# Patient Record
Sex: FEMALE | Race: WHITE | NOT HISPANIC OR LATINO | Employment: OTHER | URBAN - METROPOLITAN AREA
[De-identification: names, ages, dates, MRNs, and addresses within clinical notes are randomized per-mention and may not be internally consistent; named-entity substitution may affect disease eponyms.]

---

## 2021-05-19 ENCOUNTER — OFFICE VISIT (OUTPATIENT)
Dept: INTERNAL MEDICINE CLINIC | Facility: CLINIC | Age: 86
End: 2021-05-19
Payer: MEDICARE

## 2021-05-19 VITALS
HEART RATE: 94 BPM | TEMPERATURE: 98 F | DIASTOLIC BLOOD PRESSURE: 78 MMHG | OXYGEN SATURATION: 95 % | SYSTOLIC BLOOD PRESSURE: 124 MMHG | BODY MASS INDEX: 34.36 KG/M2 | WEIGHT: 175 LBS | HEIGHT: 60 IN

## 2021-05-19 DIAGNOSIS — E66.9 CLASS 1 OBESITY WITHOUT SERIOUS COMORBIDITY WITH BODY MASS INDEX (BMI) OF 34.0 TO 34.9 IN ADULT, UNSPECIFIED OBESITY TYPE: ICD-10-CM

## 2021-05-19 DIAGNOSIS — R60.0 LOCALIZED EDEMA: Primary | ICD-10-CM

## 2021-05-19 PROCEDURE — 99204 OFFICE O/P NEW MOD 45 MIN: CPT | Performed by: NURSE PRACTITIONER

## 2021-05-19 RX ORDER — MV-MIN/FA/VIT K/LUTEIN/ZEAXANT 200MCG-5MG
CAPSULE ORAL
COMMUNITY

## 2021-05-19 RX ORDER — FUROSEMIDE 20 MG/1
20 TABLET ORAL DAILY
Qty: 90 TABLET | Refills: 0 | Status: ON HOLD | OUTPATIENT
Start: 2021-05-19 | End: 2021-07-14 | Stop reason: SDUPTHER

## 2021-05-19 RX ORDER — AMOXICILLIN 500 MG/1
500 TABLET, FILM COATED ORAL 2 TIMES DAILY
COMMUNITY

## 2021-05-19 NOTE — PROGRESS NOTES
BMI Counseling: Body mass index is 34 18 kg/m²  The BMI is above normal  Nutrition recommendations include decreasing portion sizes, encouraging healthy choices of fruits and vegetables, decreasing fast food intake, consuming healthier snacks, limiting drinks that contain sugar, moderation in carbohydrate intake, increasing intake of lean protein, reducing intake of saturated and trans fat and reducing intake of cholesterol  Exercise recommendations include exercising 3-5 times per week  No pharmacotherapy was ordered  Patient referred to PCP due to patient being overweight  Assessment/Plan:    B/L LE edema, L>R- Has difficulty managing compression stockings as she lives alone, will try to enlist help from friends and family- on in am off in pm  Elevate legs as much as possible  Will do blood work prior to initiating Furosemide, then repeat BMP in two weeks  Follow up in three months  Diagnoses and all orders for this visit:    Localized edema  -     CBC and differential; Future  -     Comprehensive metabolic panel; Future  -     Lipid panel; Future  -     TSH, 3rd generation with Free T4 reflex; Future  -     UA w Reflex to Microscopic w Reflex to Culture -Lab Collect  -     furosemide (LASIX) 20 mg tablet; Take 1 tablet (20 mg total) by mouth daily  -     Compression Stocking  -     Basic metabolic panel; Future    Class 1 obesity without serious comorbidity with body mass index (BMI) of 34 0 to 34 9 in adult, unspecified obesity type   -     Lipid panel; Future    Other orders  -     amoxicillin (AMOXIL) 500 MG tablet; Take 500 mg by mouth 2 (two) times a day Before dental procedures  -     Cyanocobalamin (B-12 PO); Take by mouth  -     Acetaminophen (Tylenol) 325 MG CAPS; Take by mouth 2 (two) times a day as needed  -     Multiple Vitamins-Minerals (PreserVision AREDS 2+Multi Vit) CAPS; Take by mouth  -     Ascorbic Acid (VITAMIN C PO);  Take by mouth        The patient was counseled regarding instructions for management, risk factor reductions, patient and family education,impressions, risks and benefits of treatment options, side effects of medications, importance of compliance with treatment  The treatment plan was reviewed with the patient/guardian and patient/guardian understands and agrees with the treatment plan  Current Outpatient Medications:     Acetaminophen (Tylenol) 325 MG CAPS, Take by mouth 2 (two) times a day as needed, Disp: , Rfl:     amoxicillin (AMOXIL) 500 MG tablet, Take 500 mg by mouth 2 (two) times a day Before dental procedures, Disp: , Rfl:     Ascorbic Acid (VITAMIN C PO), Take by mouth, Disp: , Rfl:     Cyanocobalamin (B-12 PO), Take by mouth, Disp: , Rfl:     Multiple Vitamins-Minerals (PreserVision AREDS 2+Multi Vit) CAPS, Take by mouth, Disp: , Rfl:     furosemide (LASIX) 20 mg tablet, Take 1 tablet (20 mg total) by mouth daily, Disp: 90 tablet, Rfl: 0    Subjective:      Patient ID: Giovany Raza is a 80 y o  female  New patient here to establish, does not take any prescription medication at this time  Lives independently, has supportive family nearby  Ambulates with walker, no falls  Only complaint today is B/L LE swelling, L>R, states she was on a "water pill" in the past  Has difficulty applying compression stockings  The following portions of the patient's history were reviewed and updated as appropriate:   She has a past medical history of Breast cancer ([facility] [address] )  ,  does not have any pertinent problems on file  ,   has a past surgical history that includes EAR SURGERY; Hip surgery; and Knee surgery  ,  family history is not on file  ,   reports that she has been smoking  She has never used smokeless tobacco  She reports current alcohol use  She reports that she does not use drugs  ,  has No Known Allergies       Review of Systems   Constitutional: Negative  Negative for chills and fever  HENT: Negative for ear pain, rhinorrhea, sinus pain and sore throat  Eyes: Negative for redness and visual disturbance  Respiratory: Negative  Negative for cough, chest tightness and shortness of breath  Cardiovascular: Positive for leg swelling  Negative for chest pain and palpitations  Gastrointestinal: Negative for abdominal pain, constipation, diarrhea, nausea and vomiting  Endocrine: Negative  Genitourinary: Negative for dysuria, frequency and urgency  Musculoskeletal: Negative  Negative for arthralgias, back pain and myalgias  Skin: Negative for rash  Neurological: Negative for dizziness, numbness and headaches  Psychiatric/Behavioral: Negative  Objective:  /78 (BP Location: Left arm, Patient Position: Sitting, Cuff Size: Adult)   Pulse 94   Temp 98 °F (36 7 °C) (Temporal)   Ht 5' (1 524 m)   Wt 79 4 kg (175 lb)   SpO2 95%   BMI 34 18 kg/m²     Lab Review  No results found for any previous visit  Imaging: No results found  Physical Exam  Constitutional:       Appearance: She is well-developed  HENT:      Head: Normocephalic and atraumatic  Right Ear: External ear normal       Left Ear: External ear normal       Nose: Nose normal    Eyes:      General: Lids are normal       Conjunctiva/sclera: Conjunctivae normal       Pupils: Pupils are equal, round, and reactive to light  Neck:      Musculoskeletal: Normal range of motion and neck supple  Thyroid: No thyroid mass  Vascular: No carotid bruit  Cardiovascular:      Rate and Rhythm: Normal rate and regular rhythm  Heart sounds: Normal heart sounds  Pulmonary:      Effort: Pulmonary effort is normal       Breath sounds: Normal breath sounds  Abdominal:      General: Bowel sounds are normal       Palpations: Abdomen is soft  Musculoskeletal:      Right lower leg: Edema present  Left lower leg: Edema present  Comments: Ambulates with walker   Skin:     General: Skin is warm and dry     Neurological:      Mental Status: She is alert and oriented to person, place, and time  Deep Tendon Reflexes: Reflexes are normal and symmetric     Psychiatric:         Speech: Speech normal          Behavior: Behavior normal

## 2021-05-19 NOTE — PATIENT INSTRUCTIONS
B/L LE edema, L>R- Has difficulty managing compression stockings as she lives alone, will try to enlist help from friends and family- on in am off in pm  Elevate legs as much as possible  Will do blood work prior to initiating Furosemide, then repeat BMP in two weeks  Follow up in three months

## 2021-05-20 ENCOUNTER — APPOINTMENT (OUTPATIENT)
Dept: LAB | Facility: CLINIC | Age: 86
End: 2021-05-20
Payer: MEDICARE

## 2021-05-20 DIAGNOSIS — E66.9 CLASS 1 OBESITY WITHOUT SERIOUS COMORBIDITY WITH BODY MASS INDEX (BMI) OF 34.0 TO 34.9 IN ADULT, UNSPECIFIED OBESITY TYPE: ICD-10-CM

## 2021-05-20 DIAGNOSIS — R60.0 LOCALIZED EDEMA: ICD-10-CM

## 2021-05-20 LAB
ALBUMIN SERPL BCP-MCNC: 3.6 G/DL (ref 3.5–5)
ALP SERPL-CCNC: 80 U/L (ref 46–116)
ALT SERPL W P-5'-P-CCNC: 14 U/L (ref 12–78)
ANION GAP SERPL CALCULATED.3IONS-SCNC: 7 MMOL/L (ref 4–13)
AST SERPL W P-5'-P-CCNC: 19 U/L (ref 5–45)
BASOPHILS # BLD AUTO: 0.03 THOUSANDS/ΜL (ref 0–0.1)
BASOPHILS NFR BLD AUTO: 0 % (ref 0–1)
BILIRUB SERPL-MCNC: 0.85 MG/DL (ref 0.2–1)
BILIRUB UR QL STRIP: NEGATIVE
BUN SERPL-MCNC: 23 MG/DL (ref 5–25)
CALCIUM SERPL-MCNC: 9.8 MG/DL (ref 8.3–10.1)
CHLORIDE SERPL-SCNC: 106 MMOL/L (ref 100–108)
CHOLEST SERPL-MCNC: 217 MG/DL (ref 50–200)
CLARITY UR: NORMAL
CO2 SERPL-SCNC: 28 MMOL/L (ref 21–32)
COLOR UR: YELLOW
CREAT SERPL-MCNC: 1.14 MG/DL (ref 0.6–1.3)
EOSINOPHIL # BLD AUTO: 0.22 THOUSAND/ΜL (ref 0–0.61)
EOSINOPHIL NFR BLD AUTO: 3 % (ref 0–6)
ERYTHROCYTE [DISTWIDTH] IN BLOOD BY AUTOMATED COUNT: 14.5 % (ref 11.6–15.1)
GFR SERPL CREATININE-BSD FRML MDRD: 42 ML/MIN/1.73SQ M
GLUCOSE P FAST SERPL-MCNC: 101 MG/DL (ref 65–99)
GLUCOSE UR STRIP-MCNC: NEGATIVE MG/DL
HCT VFR BLD AUTO: 43.9 % (ref 34.8–46.1)
HDLC SERPL-MCNC: 52 MG/DL
HGB BLD-MCNC: 14.4 G/DL (ref 11.5–15.4)
HGB UR QL STRIP.AUTO: NEGATIVE
IMM GRANULOCYTES # BLD AUTO: 0.03 THOUSAND/UL (ref 0–0.2)
IMM GRANULOCYTES NFR BLD AUTO: 0 % (ref 0–2)
KETONES UR STRIP-MCNC: NEGATIVE MG/DL
LDLC SERPL CALC-MCNC: 125 MG/DL (ref 0–100)
LEUKOCYTE ESTERASE UR QL STRIP: NEGATIVE
LYMPHOCYTES # BLD AUTO: 1.99 THOUSANDS/ΜL (ref 0.6–4.47)
LYMPHOCYTES NFR BLD AUTO: 29 % (ref 14–44)
MCH RBC QN AUTO: 30 PG (ref 26.8–34.3)
MCHC RBC AUTO-ENTMCNC: 32.8 G/DL (ref 31.4–37.4)
MCV RBC AUTO: 92 FL (ref 82–98)
MONOCYTES # BLD AUTO: 0.58 THOUSAND/ΜL (ref 0.17–1.22)
MONOCYTES NFR BLD AUTO: 9 % (ref 4–12)
NEUTROPHILS # BLD AUTO: 4.01 THOUSANDS/ΜL (ref 1.85–7.62)
NEUTS SEG NFR BLD AUTO: 59 % (ref 43–75)
NITRITE UR QL STRIP: NEGATIVE
NONHDLC SERPL-MCNC: 165 MG/DL
NRBC BLD AUTO-RTO: 0 /100 WBCS
PH UR STRIP.AUTO: 6.5 [PH]
PLATELET # BLD AUTO: 273 THOUSANDS/UL (ref 149–390)
PMV BLD AUTO: 9 FL (ref 8.9–12.7)
POTASSIUM SERPL-SCNC: 4.2 MMOL/L (ref 3.5–5.3)
PROT SERPL-MCNC: 6.9 G/DL (ref 6.4–8.2)
PROT UR STRIP-MCNC: NEGATIVE MG/DL
RBC # BLD AUTO: 4.8 MILLION/UL (ref 3.81–5.12)
SODIUM SERPL-SCNC: 141 MMOL/L (ref 136–145)
SP GR UR STRIP.AUTO: 1.02 (ref 1–1.03)
TRIGL SERPL-MCNC: 201 MG/DL
TSH SERPL DL<=0.05 MIU/L-ACNC: 3.66 UIU/ML (ref 0.36–3.74)
UROBILINOGEN UR QL STRIP.AUTO: 1 E.U./DL
WBC # BLD AUTO: 6.86 THOUSAND/UL (ref 4.31–10.16)

## 2021-05-20 PROCEDURE — 85025 COMPLETE CBC W/AUTO DIFF WBC: CPT

## 2021-05-20 PROCEDURE — 84443 ASSAY THYROID STIM HORMONE: CPT

## 2021-05-20 PROCEDURE — 81003 URINALYSIS AUTO W/O SCOPE: CPT | Performed by: NURSE PRACTITIONER

## 2021-05-20 PROCEDURE — 80053 COMPREHEN METABOLIC PANEL: CPT

## 2021-05-20 PROCEDURE — 36415 COLL VENOUS BLD VENIPUNCTURE: CPT

## 2021-05-20 PROCEDURE — 80061 LIPID PANEL: CPT

## 2021-06-03 ENCOUNTER — APPOINTMENT (OUTPATIENT)
Dept: LAB | Facility: CLINIC | Age: 86
End: 2021-06-03
Payer: MEDICARE

## 2021-06-03 DIAGNOSIS — R60.0 LOCALIZED EDEMA: ICD-10-CM

## 2021-06-03 LAB
ANION GAP SERPL CALCULATED.3IONS-SCNC: 6 MMOL/L (ref 4–13)
BUN SERPL-MCNC: 37 MG/DL (ref 5–25)
CALCIUM SERPL-MCNC: 9.9 MG/DL (ref 8.3–10.1)
CHLORIDE SERPL-SCNC: 100 MMOL/L (ref 100–108)
CO2 SERPL-SCNC: 33 MMOL/L (ref 21–32)
CREAT SERPL-MCNC: 1.48 MG/DL (ref 0.6–1.3)
GFR SERPL CREATININE-BSD FRML MDRD: 31 ML/MIN/1.73SQ M
GLUCOSE P FAST SERPL-MCNC: 120 MG/DL (ref 65–99)
POTASSIUM SERPL-SCNC: 3.1 MMOL/L (ref 3.5–5.3)
SODIUM SERPL-SCNC: 139 MMOL/L (ref 136–145)

## 2021-06-03 PROCEDURE — 80048 BASIC METABOLIC PNL TOTAL CA: CPT

## 2021-06-03 PROCEDURE — 36415 COLL VENOUS BLD VENIPUNCTURE: CPT

## 2021-06-21 ENCOUNTER — TELEPHONE (OUTPATIENT)
Dept: INTERNAL MEDICINE CLINIC | Facility: CLINIC | Age: 86
End: 2021-06-21

## 2021-06-21 PROBLEM — T14.8XXA WOUND OF SKIN: Status: ACTIVE | Noted: 2021-06-21

## 2021-06-21 NOTE — TELEPHONE ENCOUNTER
[facility] states due to their census they cannot take any new patients at this time  Any questions [facility] can be reached at [phone]

## 2021-07-12 ENCOUNTER — APPOINTMENT (EMERGENCY)
Dept: RADIOLOGY | Facility: HOSPITAL | Age: 86
DRG: 700 | End: 2021-07-12
Payer: MEDICARE

## 2021-07-12 ENCOUNTER — HOSPITAL ENCOUNTER (INPATIENT)
Facility: HOSPITAL | Age: 86
LOS: 2 days | Discharge: HOME HEALTH | DRG: 700 | End: 2021-07-14
Attending: EMERGENCY MEDICINE | Admitting: INTERNAL MEDICINE
Payer: MEDICARE

## 2021-07-12 DIAGNOSIS — M79.89 LEG SWELLING: Primary | ICD-10-CM

## 2021-07-12 DIAGNOSIS — R60.0 LOCALIZED EDEMA: ICD-10-CM

## 2021-07-12 PROBLEM — R03.0 ELEVATED BLOOD PRESSURE READING: Status: ACTIVE | Noted: 2021-07-12

## 2021-07-12 PROBLEM — R60.1 ANASARCA: Status: ACTIVE | Noted: 2021-07-12

## 2021-07-12 LAB
ALBUMIN SERPL BCP-MCNC: 3.7 G/DL (ref 3.5–5)
ALP SERPL-CCNC: 96 U/L (ref 46–116)
ALT SERPL W P-5'-P-CCNC: 13 U/L (ref 12–78)
ANION GAP SERPL CALCULATED.3IONS-SCNC: 10 MMOL/L (ref 4–13)
AST SERPL W P-5'-P-CCNC: 40 U/L (ref 5–45)
ATRIAL RATE: 72 BPM
BACTERIA UR QL AUTO: ABNORMAL /HPF
BASOPHILS # BLD AUTO: 0.02 THOUSANDS/ΜL (ref 0–0.1)
BASOPHILS NFR BLD AUTO: 0 % (ref 0–1)
BILIRUB SERPL-MCNC: 1.11 MG/DL (ref 0.2–1)
BILIRUB UR QL STRIP: NEGATIVE
BUN SERPL-MCNC: 14 MG/DL (ref 5–25)
CALCIUM SERPL-MCNC: 9.2 MG/DL (ref 8.3–10.1)
CHLORIDE SERPL-SCNC: 106 MMOL/L (ref 100–108)
CLARITY UR: ABNORMAL
CO2 SERPL-SCNC: 25 MMOL/L (ref 21–32)
COLOR UR: YELLOW
CREAT SERPL-MCNC: 1.02 MG/DL (ref 0.6–1.3)
EOSINOPHIL # BLD AUTO: 0.17 THOUSAND/ΜL (ref 0–0.61)
EOSINOPHIL NFR BLD AUTO: 2 % (ref 0–6)
ERYTHROCYTE [DISTWIDTH] IN BLOOD BY AUTOMATED COUNT: 15.1 % (ref 11.6–15.1)
GFR SERPL CREATININE-BSD FRML MDRD: 48 ML/MIN/1.73SQ M
GLUCOSE SERPL-MCNC: 99 MG/DL (ref 65–140)
GLUCOSE UR STRIP-MCNC: NEGATIVE MG/DL
HCT VFR BLD AUTO: 43.3 % (ref 34.8–46.1)
HGB BLD-MCNC: 14.2 G/DL (ref 11.5–15.4)
HGB UR QL STRIP.AUTO: NEGATIVE
IMM GRANULOCYTES # BLD AUTO: 0.02 THOUSAND/UL (ref 0–0.2)
IMM GRANULOCYTES NFR BLD AUTO: 0 % (ref 0–2)
KETONES UR STRIP-MCNC: NEGATIVE MG/DL
LEUKOCYTE ESTERASE UR QL STRIP: ABNORMAL
LYMPHOCYTES # BLD AUTO: 1.76 THOUSANDS/ΜL (ref 0.6–4.47)
LYMPHOCYTES NFR BLD AUTO: 23 % (ref 14–44)
MCH RBC QN AUTO: 29.7 PG (ref 26.8–34.3)
MCHC RBC AUTO-ENTMCNC: 32.8 G/DL (ref 31.4–37.4)
MCV RBC AUTO: 91 FL (ref 82–98)
MONOCYTES # BLD AUTO: 0.53 THOUSAND/ΜL (ref 0.17–1.22)
MONOCYTES NFR BLD AUTO: 7 % (ref 4–12)
NEUTROPHILS # BLD AUTO: 5.32 THOUSANDS/ΜL (ref 1.85–7.62)
NEUTS SEG NFR BLD AUTO: 68 % (ref 43–75)
NITRITE UR QL STRIP: NEGATIVE
NON-SQ EPI CELLS URNS QL MICRO: ABNORMAL /HPF
NRBC BLD AUTO-RTO: 0 /100 WBCS
NT-PROBNP SERPL-MCNC: 957 PG/ML
P AXIS: 53 DEGREES
PH UR STRIP.AUTO: 6 [PH]
PLATELET # BLD AUTO: 239 THOUSANDS/UL (ref 149–390)
PMV BLD AUTO: 8.9 FL (ref 8.9–12.7)
POTASSIUM SERPL-SCNC: 4.8 MMOL/L (ref 3.5–5.3)
PR INTERVAL: 344 MS
PROT SERPL-MCNC: 7.6 G/DL (ref 6.4–8.2)
PROT UR STRIP-MCNC: NEGATIVE MG/DL
QRS AXIS: -55 DEGREES
QRSD INTERVAL: 132 MS
QT INTERVAL: 432 MS
QTC INTERVAL: 473 MS
RBC # BLD AUTO: 4.78 MILLION/UL (ref 3.81–5.12)
RBC #/AREA URNS AUTO: ABNORMAL /HPF
SODIUM SERPL-SCNC: 141 MMOL/L (ref 136–145)
SP GR UR STRIP.AUTO: 1.02 (ref 1–1.03)
T WAVE AXIS: 29 DEGREES
TROPONIN I SERPL-MCNC: <0.02 NG/ML
UROBILINOGEN UR QL STRIP.AUTO: 0.2 E.U./DL
VENTRICULAR RATE: 72 BPM
WBC # BLD AUTO: 7.82 THOUSAND/UL (ref 4.31–10.16)
WBC #/AREA URNS AUTO: ABNORMAL /HPF

## 2021-07-12 PROCEDURE — 99223 1ST HOSP IP/OBS HIGH 75: CPT | Performed by: INTERNAL MEDICINE

## 2021-07-12 PROCEDURE — 71045 X-RAY EXAM CHEST 1 VIEW: CPT

## 2021-07-12 PROCEDURE — 80053 COMPREHEN METABOLIC PANEL: CPT | Performed by: EMERGENCY MEDICINE

## 2021-07-12 PROCEDURE — 83880 ASSAY OF NATRIURETIC PEPTIDE: CPT | Performed by: EMERGENCY MEDICINE

## 2021-07-12 PROCEDURE — 84484 ASSAY OF TROPONIN QUANT: CPT | Performed by: EMERGENCY MEDICINE

## 2021-07-12 PROCEDURE — 93010 ELECTROCARDIOGRAM REPORT: CPT | Performed by: INTERNAL MEDICINE

## 2021-07-12 PROCEDURE — 99285 EMERGENCY DEPT VISIT HI MDM: CPT

## 2021-07-12 PROCEDURE — 93005 ELECTROCARDIOGRAM TRACING: CPT

## 2021-07-12 PROCEDURE — 36415 COLL VENOUS BLD VENIPUNCTURE: CPT | Performed by: EMERGENCY MEDICINE

## 2021-07-12 PROCEDURE — 99284 EMERGENCY DEPT VISIT MOD MDM: CPT | Performed by: EMERGENCY MEDICINE

## 2021-07-12 PROCEDURE — 85025 COMPLETE CBC W/AUTO DIFF WBC: CPT | Performed by: EMERGENCY MEDICINE

## 2021-07-12 PROCEDURE — 1124F ACP DISCUSS-NO DSCNMKR DOCD: CPT | Performed by: INTERNAL MEDICINE

## 2021-07-12 PROCEDURE — 81001 URINALYSIS AUTO W/SCOPE: CPT | Performed by: INTERNAL MEDICINE

## 2021-07-12 RX ORDER — FUROSEMIDE 10 MG/ML
20 INJECTION INTRAMUSCULAR; INTRAVENOUS DAILY
Status: DISCONTINUED | OUTPATIENT
Start: 2021-07-12 | End: 2021-07-14

## 2021-07-12 RX ORDER — NICOTINE 21 MG/24HR
1 PATCH, TRANSDERMAL 24 HOURS TRANSDERMAL DAILY
Status: DISCONTINUED | OUTPATIENT
Start: 2021-07-13 | End: 2021-07-14 | Stop reason: HOSPADM

## 2021-07-12 RX ORDER — ACETAMINOPHEN 325 MG/1
650 TABLET ORAL EVERY 6 HOURS PRN
Status: DISCONTINUED | OUTPATIENT
Start: 2021-07-12 | End: 2021-07-14 | Stop reason: HOSPADM

## 2021-07-12 RX ORDER — HEPARIN SODIUM 5000 [USP'U]/ML
5000 INJECTION, SOLUTION INTRAVENOUS; SUBCUTANEOUS EVERY 8 HOURS SCHEDULED
Status: DISCONTINUED | OUTPATIENT
Start: 2021-07-12 | End: 2021-07-14 | Stop reason: HOSPADM

## 2021-07-12 RX ADMIN — HEPARIN SODIUM 5000 UNITS: 5000 INJECTION INTRAVENOUS; SUBCUTANEOUS at 21:09

## 2021-07-12 RX ADMIN — FUROSEMIDE 20 MG: 10 INJECTION, SOLUTION INTRAMUSCULAR; INTRAVENOUS at 21:09

## 2021-07-13 ENCOUNTER — APPOINTMENT (INPATIENT)
Dept: NON INVASIVE DIAGNOSTICS | Facility: HOSPITAL | Age: 86
DRG: 700 | End: 2021-07-13
Payer: MEDICARE

## 2021-07-13 LAB
ANION GAP SERPL CALCULATED.3IONS-SCNC: 9 MMOL/L (ref 4–13)
BASOPHILS # BLD AUTO: 0.03 THOUSANDS/ΜL (ref 0–0.1)
BASOPHILS NFR BLD AUTO: 1 % (ref 0–1)
BUN SERPL-MCNC: 12 MG/DL (ref 5–25)
CALCIUM SERPL-MCNC: 9 MG/DL (ref 8.3–10.1)
CHLORIDE SERPL-SCNC: 108 MMOL/L (ref 100–108)
CO2 SERPL-SCNC: 27 MMOL/L (ref 21–32)
CREAT SERPL-MCNC: 1.12 MG/DL (ref 0.6–1.3)
CREAT UR-MCNC: 18.1 MG/DL
EOSINOPHIL # BLD AUTO: 0.23 THOUSAND/ΜL (ref 0–0.61)
EOSINOPHIL NFR BLD AUTO: 4 % (ref 0–6)
ERYTHROCYTE [DISTWIDTH] IN BLOOD BY AUTOMATED COUNT: 15.1 % (ref 11.6–15.1)
GFR SERPL CREATININE-BSD FRML MDRD: 43 ML/MIN/1.73SQ M
GLUCOSE SERPL-MCNC: 94 MG/DL (ref 65–140)
GLUCOSE SERPL-MCNC: 99 MG/DL (ref 65–140)
HCT VFR BLD AUTO: 37.5 % (ref 34.8–46.1)
HGB BLD-MCNC: 12.5 G/DL (ref 11.5–15.4)
IMM GRANULOCYTES # BLD AUTO: 0.02 THOUSAND/UL (ref 0–0.2)
IMM GRANULOCYTES NFR BLD AUTO: 0 % (ref 0–2)
LYMPHOCYTES # BLD AUTO: 1.44 THOUSANDS/ΜL (ref 0.6–4.47)
LYMPHOCYTES NFR BLD AUTO: 25 % (ref 14–44)
MCH RBC QN AUTO: 30.3 PG (ref 26.8–34.3)
MCHC RBC AUTO-ENTMCNC: 33.3 G/DL (ref 31.4–37.4)
MCV RBC AUTO: 91 FL (ref 82–98)
MONOCYTES # BLD AUTO: 0.55 THOUSAND/ΜL (ref 0.17–1.22)
MONOCYTES NFR BLD AUTO: 10 % (ref 4–12)
NEUTROPHILS # BLD AUTO: 3.4 THOUSANDS/ΜL (ref 1.85–7.62)
NEUTS SEG NFR BLD AUTO: 60 % (ref 43–75)
NRBC BLD AUTO-RTO: 0 /100 WBCS
PLATELET # BLD AUTO: 198 THOUSANDS/UL (ref 149–390)
PMV BLD AUTO: 9.3 FL (ref 8.9–12.7)
POTASSIUM SERPL-SCNC: 3.8 MMOL/L (ref 3.5–5.3)
PROT UR-MCNC: <6 MG/DL
PROT/CREAT UR: <0.33 MG/G{CREAT} (ref 0–0.1)
RBC # BLD AUTO: 4.13 MILLION/UL (ref 3.81–5.12)
SODIUM SERPL-SCNC: 144 MMOL/L (ref 136–145)
WBC # BLD AUTO: 5.67 THOUSAND/UL (ref 4.31–10.16)

## 2021-07-13 PROCEDURE — 84156 ASSAY OF PROTEIN URINE: CPT | Performed by: PHYSICIAN ASSISTANT

## 2021-07-13 PROCEDURE — 99232 SBSQ HOSP IP/OBS MODERATE 35: CPT | Performed by: PHYSICIAN ASSISTANT

## 2021-07-13 PROCEDURE — 82570 ASSAY OF URINE CREATININE: CPT | Performed by: PHYSICIAN ASSISTANT

## 2021-07-13 PROCEDURE — 85025 COMPLETE CBC W/AUTO DIFF WBC: CPT | Performed by: INTERNAL MEDICINE

## 2021-07-13 PROCEDURE — 36415 COLL VENOUS BLD VENIPUNCTURE: CPT | Performed by: INTERNAL MEDICINE

## 2021-07-13 PROCEDURE — 80048 BASIC METABOLIC PNL TOTAL CA: CPT | Performed by: INTERNAL MEDICINE

## 2021-07-13 PROCEDURE — 93306 TTE W/DOPPLER COMPLETE: CPT

## 2021-07-13 PROCEDURE — 82948 REAGENT STRIP/BLOOD GLUCOSE: CPT

## 2021-07-13 PROCEDURE — 93306 TTE W/DOPPLER COMPLETE: CPT | Performed by: INTERNAL MEDICINE

## 2021-07-13 RX ORDER — LOPERAMIDE HYDROCHLORIDE 2 MG/1
2 CAPSULE ORAL 4 TIMES DAILY PRN
Status: DISCONTINUED | OUTPATIENT
Start: 2021-07-13 | End: 2021-07-14 | Stop reason: HOSPADM

## 2021-07-13 RX ADMIN — HEPARIN SODIUM 5000 UNITS: 5000 INJECTION INTRAVENOUS; SUBCUTANEOUS at 13:37

## 2021-07-13 RX ADMIN — FUROSEMIDE 20 MG: 10 INJECTION, SOLUTION INTRAMUSCULAR; INTRAVENOUS at 08:46

## 2021-07-13 RX ADMIN — HEPARIN SODIUM 5000 UNITS: 5000 INJECTION INTRAVENOUS; SUBCUTANEOUS at 21:09

## 2021-07-13 RX ADMIN — HEPARIN SODIUM 5000 UNITS: 5000 INJECTION INTRAVENOUS; SUBCUTANEOUS at 06:18

## 2021-07-13 NOTE — PROGRESS NOTES
[facility] Progress Note - Fransisca Player 7/1/1929, 80 y o  female MRN: [id number]  Unit/Bed#: FT 05 Encounter: [id number]  Primary Care Provider: KEVIN Soto   Date and time admitted to hospital: 7/12/2021  6:38 PM    * Anasarca  Assessment & Plan  · Unclear etiology, normal protein and albumin with BMI 34, unlikely malnutrition  · Has been on diuretic in the past and utilizes compression stockings when able to apply at present  · , consider CHF vs nephrotic syndrome  · Check urine protein/creatinine ratio  · Check echo  · LFTs unremarkable save minimally elevated bilirubin  · Continue IV diuresis as initiated on admit  · Monitor I/Os and weights  · Low sodium diet    Elevated blood pressure reading  Assessment & Plan  · Systolic BP in emergency department noted greater than 170  · Isolated, appears controlled now  · Will continue to monitor, hold on initiating scheduled medication  · IV Labetalol p r n   /59 (BP Location: Right arm)   Pulse 71   Temp 97 9 °F (36 6 °C) (Oral)   Resp 21   SpO2 96%           VTE Pharmacologic Prophylaxis: VTE Score: 6 High Risk (Score >/= 5) - Pharmacological DVT Prophylaxis Ordered: heparin  Sequential Compression Devices Ordered  Patient Centered Rounds: I evaluated the patient without nursing staff present due to d/w ER nurse   Discussions with Specialists or Other Care Team Provider: CM     Education and Discussions with Family / Patient: Updated  (daughter) via phone  Time Spent for Care: 20 minutes  More than 50% of total time spent on counseling and coordination of care as described above  Current Length of Stay: 1 day(s)  Current Patient Status: Inpatient   Certification Statement: The patient will continue to require additional inpatient hospital stay due to IV diuresis  Discharge Plan: Anticipate discharge in 24-48 hrs to home      Code Status: Level 1 - Full Code    Subjective:   CC My legs still swollen but better    Patient seen this morning in the ER and doing well  Denies muscle cramps or chest pain  Denies pillow orthopnea or exertional shortness of breath  Objective:     Vitals:   Temp (24hrs), Av 9 °F (36 6 °C), Min:97 9 °F (36 6 °C), Max:97 9 °F (36 6 °C)    Temp:  [97 9 °F (36 6 °C)] 97 9 °F (36 6 °C)  HR:  [71-83] 71  Resp:  [18-21] 21  BP: (120-178)/(59-76) 120/59  SpO2:  [95 %-96 %] 96 %  There is no height or weight on file to calculate BMI  Input and Output Summary (last 24 hours):   No intake or output data in the 24 hours ending 21 0747    Physical Exam:   Physical Exam  Vitals and nursing note reviewed  Constitutional:       General: She is not in acute distress  Appearance: She is not toxic-appearing  HENT:      Head:      Comments: [place]  Cardiovascular:      Rate and Rhythm: Normal rate and regular rhythm  Heart sounds: No murmur heard  Pulmonary:      Effort: Pulmonary effort is normal       Breath sounds: Normal breath sounds  No rales  Abdominal:      General: Bowel sounds are normal       Palpations: Abdomen is soft  Musculoskeletal:      Right lower leg: Edema present  Left lower leg: Edema present  Neurological:      Mental Status: She is alert and oriented to person, place, and time     Psychiatric:         Mood and Affect: Mood normal            Additional Data:     Labs:  Results from last 7 days   Lab Units 21  0617   WBC Thousand/uL 5 67   HEMOGLOBIN g/dL 12 5   HEMATOCRIT % 37 5   PLATELETS Thousands/uL 198   NEUTROS PCT % 60   LYMPHS PCT % 25   MONOS PCT % 10   EOS PCT % 4     Results from last 7 days   Lab Units 21  1912   SODIUM mmol/L 141   POTASSIUM mmol/L 4 8   CHLORIDE mmol/L 106   CO2 mmol/L 25   BUN mg/dL 14   CREATININE mg/dL 1 02   ANION GAP mmol/L 10   CALCIUM mg/dL 9 2   ALBUMIN g/dL 3 7   TOTAL BILIRUBIN mg/dL 1 11*   ALK PHOS U/L 96   ALT U/L 13   AST U/L 40   GLUCOSE RANDOM mg/dL 99 Lines/Drains:  Invasive Devices     Peripheral Intravenous Line            Peripheral IV 07/12/21 Right Antecubital <1 day          Drain            External Urinary Catheter <1 day                      Imaging: No pertinent imaging reviewed  Recent Cultures (last 7 days):         Last 24 Hours Medication List:   Current Facility-Administered Medications   Medication Dose Route Frequency Provider Last Rate    acetaminophen  650 mg Oral Q6H PRN Rohan Alexander MD      furosemide  20 mg Intravenous Daily Rohan Alexander MD      heparin (porcine)  5,000 Units Subcutaneous Q8H [facility] Rohan Alexander MD      nicotine  1 patch Transdermal Daily Luis Alfredo Joiner MD          Today, Patient Was Seen By: Zechariah Cantrell PA-C    **Please Note: This note may have been constructed using a voice recognition system  **

## 2021-07-13 NOTE — H&P
[facility]  H&P- Taisha Larson 7/1/1929, 80 y o  female MRN: [id number]  Unit/Bed#: FT 05 Encounter: [id number]  Primary Care Provider: KEVIN Gupta   Date and time admitted to hospital: 7/12/2021  6:38 PM    * Anasarca  Assessment & Plan  Unclear etiology  Unlikely secondary malnutrition as patient shows no signs of malnutrition  LFTs grossly within normal limits on likely secondary liver disease  Will check echo  For now will place on gentle diuresis with IV Lasix 20 daily  Will check protein creatinine ratio to rule out nephrotic syndrome    Elevated blood pressure reading  Assessment & Plan  Systolic BP emergency department noted greater than 170  Just 1 reading  Will monitor for now  Labetalol p r n  VTE Prophylaxis: Heparin  / sequential compression device   Code Status: full code  POLST: There is no POLST form on file for this patient (pre-hospital)  Discussion with family: pt    Anticipated Length of Stay:  Patient will be admitted on an Inpatient basis with an anticipated length of stay of  > 2 midnights  Justification for Hospital Stay:  Anasarca    Total Time for Visit, including Counseling / Coordination of Care: 60 minutes  Greater than 50% of this total time spent on direct patient counseling and coordination of care  Chief Complaint:   Swelling    History of Present Illness:    Taisha Larson is a 80 y o  female with no significant past medical history initially presented with swelling  Patient reports has had lower extremity swelling for the past few months and noted it been worsening now in her stomach  She otherwise has no acute complaints  Denies fevers, chills, abdominal pain, nausea, vomiting, diarrhea, constipation, dysuria, headaches, numbness, weakness or any other symptoms    Review of Systems:    Review of Systems   Constitutional: Negative for activity change, appetite change, chills, diaphoresis, fever and unexpected weight change     HENT: Negative for congestion, facial swelling and rhinorrhea  Eyes: Negative for photophobia and visual disturbance  Respiratory: Negative for cough, shortness of breath and wheezing  Cardiovascular: Positive for leg swelling  Negative for chest pain and palpitations  Gastrointestinal: Negative for abdominal pain, blood in stool, constipation, diarrhea, nausea and vomiting  Genitourinary: Negative for decreased urine volume, difficulty urinating, dysuria, flank pain, frequency, hematuria and urgency  Musculoskeletal: Negative for arthralgias, back pain, joint swelling and myalgias  Neurological: Negative for dizziness, syncope, facial asymmetry, light-headedness, numbness and headaches  Psychiatric/Behavioral: Negative for confusion and decreased concentration  The patient is not nervous/anxious  Past Medical and Surgical History:     Past Medical History:   Diagnosis Date    Breast cancer [facility])        Past Surgical History:   Procedure Laterality Date    EAR SURGERY      HIP SURGERY      KNEE SURGERY         Meds/Allergies:    Prior to Admission medications    Medication Sig Start Date End Date Taking? Authorizing Provider   Acetaminophen (Tylenol) 325 MG CAPS Take by mouth 2 (two) times a day as needed    Historical Provider, MD   amoxicillin (AMOXIL) 500 MG tablet Take 500 mg by mouth 2 (two) times a day Before dental procedures    Historical Provider, MD   Ascorbic Acid (VITAMIN C PO) Take by mouth    Historical Provider, MD   Cyanocobalamin (B-12 PO) Take by mouth    Historical Provider, MD   furosemide (LASIX) 20 mg tablet Take 1 tablet (20 mg total) by mouth daily 5/19/21   KEVIN Soto   Multiple Vitamins-Minerals (PreserVision AREDS 2+Multi Vit) CAPS Take by mouth    Historical Provider, MD     I have reviewed home medications with patient personally  Allergies: No Known Allergies    Social History:     Marital Status:       Patient Pre-hospital Living Situation: home  Patient Pre-hospital Level of Mobility: independent  Patient Pre-hospital Diet Restrictions: none  Substance Use History:   Social History     Substance and Sexual Activity   Alcohol Use Yes     Social History     Tobacco Use   Smoking Status Current Some Day Smoker   Smokeless Tobacco Never Used     Social History     Substance and Sexual Activity   Drug Use Never       Family History:    History reviewed  No pertinent family history  Physical Exam:     Vitals:   Blood Pressure: (!) 178/76 (07/12/21 1645)  Pulse: 83 (07/12/21 1645)  Temperature: 97 9 °F (36 6 °C) (07/12/21 1645)  Temp Source: Oral (07/12/21 1645)  Respirations: 18 (07/12/21 1645)  SpO2: 95 % (07/12/21 1645)    Physical Exam  Constitutional:       General: She is not in acute distress  Appearance: She is well-developed  She is not diaphoretic  HENT:      Head: Normocephalic and atraumatic  Nose: Nose normal       Mouth/Throat:      Pharynx: No oropharyngeal exudate  Eyes:      General: No scleral icterus  Right eye: No discharge  Left eye: No discharge  Conjunctiva/sclera: Conjunctivae normal    Neck:      Thyroid: No thyromegaly  Vascular: No JVD  Cardiovascular:      Rate and Rhythm: Normal rate and regular rhythm  Heart sounds: Normal heart sounds  No murmur heard  No friction rub  No gallop  Pulmonary:      Effort: Pulmonary effort is normal  No respiratory distress  Breath sounds: Normal breath sounds  No wheezing or rales  Chest:      Chest wall: No tenderness  Abdominal:      General: Bowel sounds are normal  There is no distension  Palpations: Abdomen is soft  Tenderness: There is no abdominal tenderness  There is no guarding or rebound  Musculoskeletal:         General: No tenderness or deformity  Normal range of motion  Cervical back: Normal range of motion and neck supple  Skin:     General: Skin is warm and dry  Findings: No erythema or rash  Neurological:      Mental Status: She is alert  Mental status is at baseline  Cranial Nerves: No cranial nerve deficit  Sensory: No sensory deficit  Motor: No abnormal muscle tone  Coordination: Coordination normal              Additional Data:     Lab Results: I have personally reviewed pertinent reports  Results from last 7 days   Lab Units 07/12/21 1912   WBC Thousand/uL 7 82   HEMOGLOBIN g/dL 14 2   HEMATOCRIT % 43 3   PLATELETS Thousands/uL 239   NEUTROS PCT % 68   LYMPHS PCT % 23   MONOS PCT % 7   EOS PCT % 2     Results from last 7 days   Lab Units 07/12/21 1912   SODIUM mmol/L 141   POTASSIUM mmol/L 4 8   CHLORIDE mmol/L 106   CO2 mmol/L 25   BUN mg/dL 14   CREATININE mg/dL 1 02   ANION GAP mmol/L 10   CALCIUM mg/dL 9 2   ALBUMIN g/dL 3 7   TOTAL BILIRUBIN mg/dL 1 11*   ALK PHOS U/L 96   ALT U/L 13   AST U/L 40   GLUCOSE RANDOM mg/dL 99                       Imaging: I have personally reviewed pertinent reports  XR chest 1 view portable    (Results Pending)       EKG, Pathology, and Other Studies Reviewed on Admission:   · EKG: reviewed    [facility] Records Reviewed: Yes     ** Please Note: This note has been constructed using a voice recognition system   **

## 2021-07-13 NOTE — ASSESSMENT & PLAN NOTE
· Systolic BP in emergency department noted greater than 170  · Isolated, appears controlled now  · Will continue to monitor, hold on initiating scheduled medication  · IV Labetalol p r n   /59 (BP Location: Right arm)   Pulse 71   Temp 97 9 °F (36 6 °C) (Oral)   Resp 21   SpO2 96%

## 2021-07-13 NOTE — ASSESSMENT & PLAN NOTE
Unclear etiology  Unlikely secondary malnutrition as patient shows no signs of malnutrition  LFTs grossly within normal limits on likely secondary liver disease  Will check echo  For now will place on gentle diuresis with IV Lasix 20 daily  Will check protein creatinine ratio to rule out nephrotic syndrome

## 2021-07-13 NOTE — ASSESSMENT & PLAN NOTE
· Unclear etiology, normal protein and albumin with BMI 34, unlikely malnutrition  · Has been on diuretic in the past and utilizes compression stockings when able to apply at present  · , consider CHF vs nephrotic syndrome  · Check urine protein/creatinine ratio  · Check echo  · LFTs unremarkable save minimally elevated bilirubin  · Continue IV diuresis as initiated on admit  · Monitor I/Os and weights  · Low sodium diet

## 2021-07-13 NOTE — PLAN OF CARE
Problem: MOBILITY - ADULT  Goal: Maintain or return to baseline ADL function  Description: INTERVENTIONS:  -  Assess patient's ability to carry out ADLs; assess patient's baseline for ADL function and identify physical deficits which impact ability to perform ADLs (bathing, care of mouth/teeth, toileting, grooming, dressing, etc )  - Assess/evaluate cause of self-care deficits   - Assess range of motion  - Assess patient's mobility; develop plan if impaired  - Assess patient's need for assistive devices and provide as appropriate  - Encourage maximum independence but intervene and supervise when necessary  - Involve family in performance of ADLs  - Assess for home care needs following discharge   - Consider OT consult to assist with ADL evaluation and planning for discharge  - Provide patient education as appropriate  Outcome: Progressing  Goal: Maintains/Returns to pre admission functional level  Description: INTERVENTIONS:  - Perform BMAT or MOVE assessment daily    - Set and communicate daily mobility goal to care team and patient/family/caregiver  - Collaborate with rehabilitation services on mobility goals if consulted  - Perform Range of Motion 3 times a day  - Reposition patient every 2 hours    - Dangle patient 3 times a day  - Stand patient 3 times a day  - Ambulate patient 3 times a day  - Out of bed to chair 3 times a day   - Out of bed for meals 3 times a day  - Out of bed for toileting  - Record patient progress and toleration of activity level   Outcome: Progressing     Problem: Potential for Falls  Goal: Patient will remain free of falls  Description: INTERVENTIONS:  - Educate patient/family on patient safety including physical limitations  - Instruct patient to call for assistance with activity   - Consult OT/PT to assist with strengthening/mobility   - Keep Call bell within reach  - Keep bed low and locked with side rails adjusted as appropriate  - Keep care items and personal belongings within reach  - Initiate and maintain comfort rounds  - Make Fall Risk Sign visible to staff  - Offer Toileting every 2 Hours, in advance of need  - Initiate/Maintain bed alarm  - Obtain necessary fall risk management equipment  - Apply yellow socks and bracelet for high fall risk patients  - Consider moving patient to room near nurses station  Outcome: Progressing     Problem: Prexisting or High Potential for Compromised Skin Integrity  Goal: Skin integrity is maintained or improved  Description: INTERVENTIONS:  - Identify patients at risk for skin breakdown  - Assess and monitor skin integrity  - Assess and monitor nutrition and hydration status  - Monitor labs   - Assess for incontinence   - Turn and reposition patient  - Assist with mobility/ambulation  - Relieve pressure over bony prominences  - Avoid friction and shearing  - Provide appropriate hygiene as needed including keeping skin clean and dry  - Evaluate need for skin moisturizer/barrier cream  - Collaborate with interdisciplinary team   - Patient/family teaching  - Consider wound care consult   Outcome: Progressing

## 2021-07-13 NOTE — ASSESSMENT & PLAN NOTE
Systolic BP emergency department noted greater than 170  Just 1 reading  Will monitor for now  Labetalol p r n

## 2021-07-14 ENCOUNTER — APPOINTMENT (INPATIENT)
Dept: VASCULAR ULTRASOUND | Facility: HOSPITAL | Age: 86
DRG: 700 | End: 2021-07-14
Payer: MEDICARE

## 2021-07-14 VITALS
DIASTOLIC BLOOD PRESSURE: 70 MMHG | WEIGHT: 177.91 LBS | TEMPERATURE: 97.6 F | HEIGHT: 62 IN | HEART RATE: 57 BPM | SYSTOLIC BLOOD PRESSURE: 144 MMHG | RESPIRATION RATE: 18 BRPM | BODY MASS INDEX: 32.74 KG/M2 | OXYGEN SATURATION: 98 %

## 2021-07-14 LAB
ALBUMIN SERPL BCP-MCNC: 2.7 G/DL (ref 3.5–5)
ALP SERPL-CCNC: 72 U/L (ref 46–116)
ALT SERPL W P-5'-P-CCNC: 10 U/L (ref 12–78)
ANION GAP SERPL CALCULATED.3IONS-SCNC: 8 MMOL/L (ref 4–13)
AST SERPL W P-5'-P-CCNC: 16 U/L (ref 5–45)
BILIRUB SERPL-MCNC: 0.82 MG/DL (ref 0.2–1)
BUN SERPL-MCNC: 16 MG/DL (ref 5–25)
CALCIUM ALBUM COR SERPL-MCNC: 9.5 MG/DL (ref 8.3–10.1)
CALCIUM SERPL-MCNC: 8.5 MG/DL (ref 8.3–10.1)
CHLORIDE SERPL-SCNC: 108 MMOL/L (ref 100–108)
CO2 SERPL-SCNC: 27 MMOL/L (ref 21–32)
CREAT SERPL-MCNC: 1.25 MG/DL (ref 0.6–1.3)
ERYTHROCYTE [DISTWIDTH] IN BLOOD BY AUTOMATED COUNT: 14.6 % (ref 11.6–15.1)
GFR SERPL CREATININE-BSD FRML MDRD: 37 ML/MIN/1.73SQ M
GLUCOSE SERPL-MCNC: 98 MG/DL (ref 65–140)
HCT VFR BLD AUTO: 38.6 % (ref 34.8–46.1)
HGB BLD-MCNC: 12.7 G/DL (ref 11.5–15.4)
MCH RBC QN AUTO: 29.9 PG (ref 26.8–34.3)
MCHC RBC AUTO-ENTMCNC: 32.9 G/DL (ref 31.4–37.4)
MCV RBC AUTO: 91 FL (ref 82–98)
PLATELET # BLD AUTO: 220 THOUSANDS/UL (ref 149–390)
PMV BLD AUTO: 9.1 FL (ref 8.9–12.7)
POTASSIUM SERPL-SCNC: 3.4 MMOL/L (ref 3.5–5.3)
PROT SERPL-MCNC: 5.7 G/DL (ref 6.4–8.2)
RBC # BLD AUTO: 4.25 MILLION/UL (ref 3.81–5.12)
SODIUM SERPL-SCNC: 143 MMOL/L (ref 136–145)
WBC # BLD AUTO: 7.62 THOUSAND/UL (ref 4.31–10.16)

## 2021-07-14 PROCEDURE — 80053 COMPREHEN METABOLIC PANEL: CPT | Performed by: FAMILY MEDICINE

## 2021-07-14 PROCEDURE — 93970 EXTREMITY STUDY: CPT | Performed by: SURGERY

## 2021-07-14 PROCEDURE — 99239 HOSP IP/OBS DSCHRG MGMT >30: CPT | Performed by: PHYSICIAN ASSISTANT

## 2021-07-14 PROCEDURE — 93970 EXTREMITY STUDY: CPT

## 2021-07-14 PROCEDURE — 85027 COMPLETE CBC AUTOMATED: CPT | Performed by: FAMILY MEDICINE

## 2021-07-14 RX ORDER — FUROSEMIDE 20 MG/1
20 TABLET ORAL DAILY PRN
Qty: 90 TABLET | Refills: 0
Start: 2021-07-14 | End: 2022-03-21 | Stop reason: SDUPTHER

## 2021-07-14 RX ORDER — POTASSIUM CHLORIDE 750 MG/1
20 CAPSULE, EXTENDED RELEASE ORAL DAILY
Qty: 30 CAPSULE | Refills: 0 | Status: SHIPPED | OUTPATIENT
Start: 2021-07-14 | End: 2022-03-21 | Stop reason: SDUPTHER

## 2021-07-14 RX ORDER — POTASSIUM CHLORIDE 20 MEQ/1
40 TABLET, EXTENDED RELEASE ORAL ONCE
Status: COMPLETED | OUTPATIENT
Start: 2021-07-14 | End: 2021-07-14

## 2021-07-14 RX ADMIN — HEPARIN SODIUM 5000 UNITS: 5000 INJECTION INTRAVENOUS; SUBCUTANEOUS at 05:28

## 2021-07-14 RX ADMIN — POTASSIUM CHLORIDE 40 MEQ: 1500 TABLET, EXTENDED RELEASE ORAL at 13:30

## 2021-07-14 RX ADMIN — HEPARIN SODIUM 5000 UNITS: 5000 INJECTION INTRAVENOUS; SUBCUTANEOUS at 13:33

## 2021-07-14 NOTE — ASSESSMENT & PLAN NOTE
· Unclear etiology, normal protein and albumin with BMI 34, unlikely malnutrition; echocardiogram essentially normal and urine creatinine/protein ratio low consistent with diuretic use  · Has been on diuretic in the past and utilizes compression stockings when able to apply at present  ·   · Recommend on discharge:  · Low sodium diet  · Lasix 20 mg daily as needed + potassium chloride supplement when diuretic taken  · Compression stockings as able to apply/tolerate  · Elevation of legs  · Consider outpatient venous duplex with reflux study

## 2021-07-14 NOTE — DISCHARGE SUMMARY
[facility]  SL Discharge- Homa Deeds 7/1/1929, 80 y o  female MRN: [id number]  Unit/Bed#: -01 Encounter: [id number]  Primary Care Provider: KEVIN Colbert   Date and time admitted to hospital: 7/12/2021  6:38 PM    * Anasarca  Assessment & Plan  · Unclear etiology, normal protein and albumin with BMI 34, unlikely malnutrition; echocardiogram essentially normal and urine creatinine/protein ratio low consistent with diuretic use  · Has been on diuretic in the past and utilizes compression stockings when able to apply at present  ·   · Recommend on discharge:  · Low sodium diet  · Lasix 20 mg daily as needed + potassium chloride supplement when diuretic taken  · Compression stockings as able to apply/tolerate  · Elevation of legs  · Consider outpatient venous duplex with reflux study    Elevated blood pressure reading  Assessment & Plan  · Systolic BP in emergency department noted greater than 170  · Isolated, appears reasonably controlled for age, no recommendation for blood pressure medication at this time  /70   Pulse 57   Temp 97 6 °F (36 4 °C) (Oral)   Resp 18   Ht 5' 2" (1 575 m)   Wt 80 7 kg (177 lb 14 6 oz)   SpO2 98%   BMI 32 54 kg/m²         Medical Problems     Resolved Problems  Date Reviewed: 7/14/2021    None              Discharging Physician / Practitioner: Jesús Chau PA-C  PCP: Rebekah Colbert  Admission Date:   Admission Orders (From admission, onward)     Ordered        07/12/21 2033  Inpatient Admission  Once                   Discharge Date: 07/14/21    Consultations During Hospital Stay:  · None     Procedures Performed:   · None     Significant Findings / Test Results:   XR chest 1 view portable   Final Result by Sepideh Fair DO (07/13 7791)      No acute cardiopulmonary disease                    Workstation performed: [id number]         Echo (7/13/2021):  pEF and no diastolic or valvular abnormalities   Venous duplex (7/14/21): No evidence of DVT bilaterally     Incidental Findings:   · None      Test Results Pending at Discharge (will require follow up): · None      Outpatient Tests Requested:  · PCP follow up  · Venous duplex with reflux     Complications:  None     Reason for Admission: edema     Hospital Course:   Evangelina Melgar is a 80 y o  female patient who originally presented to the hospital on 7/12/2021 due to lower extremity edema  Patient has past medical history of diuretic use, but reports taking no medications on a routine basis  She was previously on Lasix which was stopped due to kidney problems  Her daughter reports that she does not have the best diet, typically eats a lot of salty foods  No history of any DVT or cardiac/vascular issues that they are aware of  Patient was diuresed with IV Lasix 20 mg daily with significant effect  She did have slight increase in her creatinine, however is hemodynamically stable  Workup unrevealing for cardiac ir renal problem, patient not malnourished  Recommend elevation of the legs, compression, low-sodium diet, and increase in activity at home  Can also pursue outpatient venous duplex with reflux study  Please see above list of diagnoses and related plan for additional information  Condition at Discharge: stable    Discharge Day Visit / Exam:   Subjective:  Patient seen this morning, out of bed to chair  She is cold still, but offers no new complaints  No chest pain or shortness of breath  No muscle aches or cramps  Reports significant urine output and once the purewick since she is having trouble waiting to use the restroom    Vitals: Blood Pressure: 144/70 (07/14/21 0731)  Pulse: 57 (07/14/21 0731)  Temperature: 97 6 °F (36 4 °C) (07/14/21 0731)  Temp Source: Oral (07/14/21 0731)  Respirations: 18 (07/14/21 0731)  Height: 5' 2" (157 5 cm) (07/13/21 1235)  Weight - Scale: 80 7 kg (177 lb 14 6 oz) (07/14/21 0549)  SpO2: 98 % (07/14/21 0731)  Exam:   Physical Exam  Vitals and nursing note reviewed  Constitutional:       General: She is not in acute distress  Appearance: She is not toxic-appearing or diaphoretic  Cardiovascular:      Rate and Rhythm: Normal rate and regular rhythm  Pulses: Normal pulses  Heart sounds: Normal heart sounds  No murmur heard  Pulmonary:      Effort: Pulmonary effort is normal  No respiratory distress  Breath sounds: Normal breath sounds  No wheezing or rales  Abdominal:      General: Bowel sounds are normal       Palpations: Abdomen is soft  Musculoskeletal:      Right lower leg: Edema (improved) present  Left lower leg: Edema (>R, improved) present  Neurological:      Mental Status: She is alert and oriented to person, place, and time  Psychiatric:         Mood and Affect: Mood normal          Behavior: Behavior normal            Discussion with Family: Updated  (daughter) via phone  Discharge instructions/Information to patient and family:   See after visit summary for information provided to patient and family  Provisions for Follow-Up Care:  See after visit summary for information related to follow-up care and any pertinent home health orders  Disposition:   Home    Planned Readmission: none      Discharge Statement:  I spent 40 minutes discharging the patient  This time was spent on the day of discharge  I had direct contact with the patient on the day of discharge  Greater than 50% of the total time was spent examining patient, answering all patient questions, arranging and discussing plan of care with patient as well as directly providing post-discharge instructions  Additional time then spent on discharge activities  Discharge Medications:  See after visit summary for reconciled discharge medications provided to patient and/or family        **Please Note: This note may have been constructed using a voice recognition system**

## 2021-07-14 NOTE — ASSESSMENT & PLAN NOTE
· Systolic BP in emergency department noted greater than 170  · Isolated, appears reasonably controlled for age, no recommendation for blood pressure medication at this time  /70   Pulse 57   Temp 97 6 °F (36 4 °C) (Oral)   Resp 18   Ht 5' 2" (1 575 m)   Wt 80 7 kg (177 lb 14 6 oz)   SpO2 98%   BMI 32 54 kg/m²

## 2021-07-14 NOTE — DISCHARGE INSTRUCTIONS
Edema   WHAT YOU NEED TO KNOW:   What is edema? Edema is swelling throughout your body  Edema is usually a sign that you are retaining fluid  The swelling may be caused by heart failure or kidney, thyroid, or liver disease  It may also be caused by medicines such as antidepressants, blood pressure medicines, or hormones  Sudden swelling around the lips or face may be a sign of a severe allergic reaction  Swelling of an arm or leg may be caused by blockage of your veins  What other signs and symptoms may occur with edema? · Discomfort or tenderness in the swollen areas    · Tight and shiny skin over the swollen areas    · Weight gain    How is edema diagnosed? Your healthcare provider will ask about your symptoms and any other symptoms you have  He may also ask about any medical conditions you have  Your healthcare provider will examine your skin over the swollen areas  He may gently push on the swollen area for a short time to see if this leaves a dimple  He may also order tests to find the cause of your edema  How is edema treated and managed? Treatment for edema depends on the cause  Depending on your medical condition, you may be given medicine to help get rid of extra body fluid  Your healthcare provider may suggest that you do any of the following to help manage edema:  · Elevate  your arms or legs as directed  Raise them above the level of your heart as often as you can  This will help decrease swelling and pain  Prop them on pillows or blankets to keep them elevated comfortably  · Wear pressure stockings as directed  The stockings are tight and put pressure on your legs  This helps to keep fluid from collecting in your legs or ankles  · Limit your salt intake  Salt causes your body to hold water  Ask about any other changes to your diet  · Stay active  Do not stand or sit for long periods of time  Ask your healthcare provider about the best exercise plan for you      · Keep your skin moist  using lotion, cream, or ointment  Ask your healthcare provider what to use and how often to use it  When should I contact my healthcare provider? · The swollen area feels cold and is pale or blue in color  · The swollen area feels warm, painful, and is red in color  · You have increased swelling or swelling in other parts of your body  · You have questions or concerns about your condition or care  When should I seek immediate care? · You have shortness of breath at rest, especially when you lie down  · You cough up pink, foamy sputum  · You have chest pain  · Your heartbeat is fast or uneven  CARE AGREEMENT:   You have the right to help plan your care  Learn about your health condition and how it may be treated  Discuss treatment options with your healthcare providers to decide what care you want to receive  You always have the right to refuse treatment  The above information is an  only  It is not intended as medical advice for individual conditions or treatments  Talk to your doctor, nurse or pharmacist before following any medical regimen to see if it is safe and effective for you  © Copyright [address] Information is for End User's use only and may not be sold, redistributed or otherwise used for commercial purposes  All illustrations and images included in CareNotes® are the copyrighted property of A D A M , Inc  or [email or web address] [facility]    Leg Edema   WHAT YOU NEED TO KNOW:   Leg edema is swelling caused by fluid buildup  Your legs may swell if you sit or stand for long periods of time, are pregnant, or are injured  Swelling may also occur if you have heart failure or circulation problems  This means that your heart does not pump blood through your body as it should  DISCHARGE INSTRUCTIONS:   Call your local emergency number ([address] in the [address] [place] [address],[address]) for any of the following:   · You cannot walk      · You have chest pain or trouble breathing that is worse when you lie down  · You suddenly feel lightheaded and have trouble breathing  · You have new and sudden chest pain  You may have more pain when you take deep breaths or cough  · You cough up blood  Return to the emergency department if:   · You feel faint or confused  · Your skin turns blue or gray  · Your leg feels warm, tender, and painful  It may be swollen and red  Call your doctor if:   · You have a fever or feel more tired than usual     · The veins in your legs look larger than usual  They may look full or bulging  · Your legs itch or feel heavy  · You have red or white areas or sores on your legs  The skin may also appear dimpled or have indentations  · You are gaining weight  · You have trouble moving your ankles  · The swelling does not go away, or other parts of your body swell  · You have questions or concerns about your condition or care  Self-care:   · Elevate your legs  Raise your legs above the level of your heart as often as you can  This will help decrease swelling and pain  Prop your legs on pillows or blankets to keep them elevated comfortably  · Wear pressure stockings, if directed  These tight stockings put pressure on your legs to promote blood flow and prevent blood clots  Put them on before you get out of bed  Wear the stockings during the day  Do not wear them while you sleep  · Stay active  Do not stand or sit for long periods of time  Ask your healthcare provider about the best exercise plan for you  · Eat healthy foods  Healthy foods include fruits, vegetables, whole-grain breads, low-fat dairy products, beans, lean meats, and fish  Ask if you need to be on a special diet  · Limit sodium (salt)  Salt will make your body hold even more fluid  Your healthcare provider will tell you how many milligrams (mg) of salt you can have each day         Follow up with your doctor as directed:  Write down your questions so you remember to ask them during your visits  © Copyright [address] Information is for End User's use only and may not be sold, redistributed or otherwise used for commercial purposes  All illustrations and images included in CareNotes® are the copyrighted property of A D A M , Inc  or Dylan Preston  The above information is an  only  It is not intended as medical advice for individual conditions or treatments  Talk to your doctor, nurse or pharmacist before following any medical regimen to see if it is safe and effective for you  Edema   WHAT YOU NEED TO KNOW:   Edema is swelling throughout your body  Edema is usually a sign that you are retaining fluid  The swelling may be caused by heart failure or kidney, thyroid, or liver disease  It may also be caused by medicines such as antidepressants, blood pressure medicines, or hormones  Sudden swelling around the lips or face may be a sign of a severe allergic reaction  Swelling of an arm or leg may be caused by blockage of your veins  DISCHARGE INSTRUCTIONS:   Seek care immediately if:   · You have shortness of breath at rest, especially when you lie down  · You cough up pink, foamy sputum  · You have chest pain  · Your heartbeat is fast or uneven  Contact your healthcare provider if:   · The swollen area feels cold and is pale or blue in color  · The swollen area feels warm, painful, and is red in color  · You have increased swelling or swelling in other parts of your body  · You have questions or concerns about your condition or care  Medicines:   · Medicines  help to get rid of extra body fluid  · Take your medicine as directed  Contact your healthcare provider if you think your medicine is not helping or if you have side effects  Tell him or her if you are allergic to any medicine  Keep a list of the medicines, vitamins, and herbs you take  Include the amounts, and when and why you take them   Bring the list or the pill bottles to follow-up visits  Carry your medicine list with you in case of an emergency  Follow up with your healthcare provider as directed:  Write down your questions so you remember to ask them during your visits  Manage edema:   · Elevate  your arms or legs as directed  Raise them above the level of your heart as often as you can  This will help decrease swelling and pain  Prop them on pillows or blankets to keep them elevated comfortably  · Wear pressure stockings as directed  The stockings are tight and put pressure on your legs  This helps to keep fluid from collecting in your legs or ankles  · Limit your salt intake  Salt causes your body to hold water  Ask about any other changes to your diet  · Stay active  Do not stand or sit for long periods of time  Ask your healthcare provider about the best exercise plan for you  · Keep your skin moist  using lotion, cream, or ointment  Ask your healthcare provider what to use and how often to use it  © Copyright [address] Information is for End User's use only and may not be sold, redistributed or otherwise used for commercial purposes  All illustrations and images included in CareNotes® are the copyrighted property of [employer]  or [address] [place], [place] [address]larissa Vail   The above information is an  only  It is not intended as medical advice for individual conditions or treatments  Talk to your doctor, nurse or pharmacist before following any medical regimen to see if it is safe and effective for you

## 2021-07-16 ENCOUNTER — TRANSITIONAL CARE MANAGEMENT (OUTPATIENT)
Dept: INTERNAL MEDICINE CLINIC | Facility: CLINIC | Age: 86
End: 2021-07-16

## 2021-07-21 ENCOUNTER — OFFICE VISIT (OUTPATIENT)
Dept: INTERNAL MEDICINE CLINIC | Facility: CLINIC | Age: 86
End: 2021-07-21
Payer: MEDICARE

## 2021-07-21 VITALS
BODY MASS INDEX: 32.54 KG/M2 | OXYGEN SATURATION: 99 % | SYSTOLIC BLOOD PRESSURE: 124 MMHG | TEMPERATURE: 98.1 F | HEART RATE: 90 BPM | HEIGHT: 62 IN | DIASTOLIC BLOOD PRESSURE: 70 MMHG

## 2021-07-21 DIAGNOSIS — J34.89 RHINORRHEA: ICD-10-CM

## 2021-07-21 DIAGNOSIS — R03.0 ELEVATED BLOOD PRESSURE READING: ICD-10-CM

## 2021-07-21 DIAGNOSIS — R60.0 LOCALIZED EDEMA: Primary | ICD-10-CM

## 2021-07-21 PROCEDURE — 99495 TRANSJ CARE MGMT MOD F2F 14D: CPT | Performed by: NURSE PRACTITIONER

## 2021-07-21 RX ORDER — FLUTICASONE PROPIONATE 50 MCG
1 SPRAY, SUSPENSION (ML) NASAL DAILY
Qty: 9.9 ML | Refills: 5 | Status: SHIPPED | OUTPATIENT
Start: 2021-07-21

## 2021-07-21 NOTE — PATIENT INSTRUCTIONS
1  B/L LE edema- Improved  Continue Furosemide as needed  To elevate your legs as much as possible when sitting  Unable to tolerate compression stockings  2  Gait disorder- Continue physical therapy, doing well  3  Rhinorrhea- Start Flonase as discussed  Follow up as scheduled

## 2021-07-21 NOTE — PROGRESS NOTES
Assessment/Plan:     1  B/L LE edema- Improved  Continue Furosemide as needed  To elevate your legs as much as possible when sitting  Unable to tolerate compression stockings  2  Gait disorder- Continue physical therapy, doing well  3  Rhinorrhea- Start Flonase as discussed  Follow up as scheduled  Diagnoses and all orders for this visit:    Localized edema    Elevated blood pressure reading    Rhinorrhea  -     fluticasone (FLONASE) 50 mcg/act nasal spray; 1 spray into each nostril daily       Subjective:     Patient ID: Erin Roberto is a 80 y o  female  Recent Hospitalization for B/L LE edema  Discharged on Furosemide as needed with potassium supplement  Few weeks of blowing nose frequently, no fever, chills  No cough  Review of Systems   Constitutional: Negative  Respiratory: Negative  Cardiovascular: Positive for leg swelling  Musculoskeletal: Positive for gait problem  Psychiatric/Behavioral: Negative  Objective:     Physical Exam  Constitutional:       Appearance: She is well-developed  Cardiovascular:      Rate and Rhythm: Normal rate and regular rhythm  Heart sounds: Normal heart sounds  Pulmonary:      Effort: Pulmonary effort is normal       Breath sounds: Normal breath sounds  Musculoskeletal:      Right lower leg: Edema present  Left lower leg: Edema present  Comments: Ambulates with walker   Neurological:      Mental Status: She is alert and oriented to person, place, and time  Deep Tendon Reflexes: Reflexes are normal and symmetric  Psychiatric:         Behavior: Behavior normal          Thought Content: Thought content normal          Judgment: Judgment normal            Vitals:    07/21/21 1327   BP: 124/70   Pulse: 90   Temp: 98 1 °F (36 7 °C)   SpO2: 99%   Height: 5' 2" (1 575 m)       Transitional Care Management Review:  Erin Roberto is a 80 y o  female here for TCM follow up       During the TCM phone call patient stated:    TCM Call (since 6/20/2021)     Date and time call was made  7/16/2021  9:25 AM    Hospital care reviewed  Records reviewed    Patient was hospitialized at  [facility]        Date of Admission  07/12/21    Date of discharge  07/14/21    Disposition  Home    Were the patients medications reviewed and updated  Yes    Current Symptoms  None      TCM Call (since 6/20/2021)     Post hospital issues  None    Should patient be enrolled in anticoag monitoring? No    Scheduled for follow up?   Yes    Did you obtain your prescribed medications  Yes    Do you need help managing your prescriptions or medications  No    Is transportation to your appointment needed  No    I have advised the patient to call PCP with any new or worsening symptoms  moon LEVI    Living Arrangements  Alone    Are you recieving any outpatient services  No    Are you recieving home care services  No    Are you using any community resources  No    Current waiver services  No    Have you fallen in the last 12 months  No    Interperter language line needed  No    Counseling  Patient              [place], [address]

## 2021-07-25 NOTE — ED PROVIDER NOTES
History  Chief Complaint   Patient presents with    Leg Swelling     Patient co "bilateral leg swelling for years but has worsen " Instructed to come to ER by PCP  79 yo f presenting to the emergency edpartment for eval of leg swelling  Pt has had b/l leg swelling for years which has gotten notably worse in the past week, now up to her abdomen  No fevers, chills  Some mild aching pain bilaterally  No chest tightness, sob, or navarro  Prior to Admission Medications   Prescriptions Last Dose Informant Patient Reported? Taking? Acetaminophen (Tylenol) 325 MG CAPS Past Month at Unknown time  Yes Yes   Sig: Take by mouth 2 (two) times a day as needed   Ascorbic Acid (VITAMIN C PO) Past Week at Unknown time  Yes Yes   Sig: Take by mouth   Cyanocobalamin (B-12 PO) Past Week at Unknown time  Yes Yes   Sig: Take by mouth   Multiple Vitamins-Minerals (PreserVision AREDS 2+Multi Vit) CAPS Past Week at Unknown time  Yes Yes   Sig: Take by mouth   amoxicillin (AMOXIL) 500 MG tablet Not Taking at Unknown time  Yes No   Sig: Take 500 mg by mouth 2 (two) times a day Before dental procedures   Patient not taking: Reported on 7/13/2021   furosemide (LASIX) 20 mg tablet Not Taking at Unknown time  No No   Sig: Take 1 tablet (20 mg total) by mouth daily   Patient not taking: Reported on 7/13/2021      Facility-Administered Medications: None       Past Medical History:   Diagnosis Date    Breast cancer ([facility] [address] )        Past Surgical History:   Procedure Laterality Date    EAR SURGERY      HIP SURGERY      KNEE SURGERY         History reviewed  No pertinent family history  I have reviewed and agree with the history as documented  E-Cigarette/Vaping    E-Cigarette Use Never User      E-Cigarette/Vaping Substances     Social History     Tobacco Use    Smoking status: Current Some Day Smoker    Smokeless tobacco: Never Used   Vaping Use    Vaping Use: Never used   Substance Use Topics    Alcohol use:  Yes    Drug use: Never       Review of Systems   Constitutional: Negative for appetite change, chills, fatigue and fever  HENT: Negative for sneezing and sore throat  Eyes: Negative for visual disturbance  Respiratory: Negative for cough, choking, chest tightness, shortness of breath and wheezing  Cardiovascular: Positive for leg swelling  Negative for chest pain and palpitations  Gastrointestinal: Negative for abdominal pain, constipation, diarrhea, nausea and vomiting  Genitourinary: Negative for difficulty urinating and dysuria  Neurological: Negative for dizziness, weakness, light-headedness, numbness and headaches  All other systems reviewed and are negative  Physical Exam  Physical Exam  Constitutional:       General: She is not in acute distress  Appearance: She is well-developed  She is not diaphoretic  HENT:      Head: Normocephalic and atraumatic  Eyes:      Pupils: Pupils are equal, round, and reactive to light  Neck:      Vascular: No JVD  Trachea: No tracheal deviation  Cardiovascular:      Rate and Rhythm: Normal rate and regular rhythm  Heart sounds: Normal heart sounds  No murmur heard  No friction rub  No gallop  Pulmonary:      Effort: Pulmonary effort is normal  No respiratory distress  Breath sounds: Normal breath sounds  No wheezing or rales  Abdominal:      General: Bowel sounds are normal  There is no distension  Palpations: Abdomen is soft  Tenderness: There is no abdominal tenderness  There is no guarding or rebound  Skin:     General: Skin is warm and dry  Coloration: Skin is not pale  Comments: Pt with anasarca up to lower abdomen  Neurological:      Mental Status: She is alert and oriented to person, place, and time  Cranial Nerves: No cranial nerve deficit  Motor: No abnormal muscle tone     Psychiatric:         Behavior: Behavior normal          Vital Signs  ED Triage Vitals   Temperature Pulse Respirations Blood Pressure SpO2   07/12/21 1645 07/12/21 1645 07/12/21 1645 07/12/21 1645 07/12/21 1645   97 9 °F (36 6 °C) 83 18 (!) 178/76 95 %      Temp Source Heart Rate Source Patient Position - Orthostatic VS BP Location FiO2 (%)   07/12/21 1645 07/12/21 1645 07/12/21 1645 07/12/21 1645 --   Oral Monitor Sitting Right arm       Pain Score       07/13/21 1237       No Pain           Vitals:    07/13/21 1235 07/13/21 1540 07/13/21 2233 07/14/21 0731   BP: 147/79 131/55 95/55 144/70   Pulse: 86 69 102 57   Patient Position - Orthostatic VS:             Visual Acuity  Visual Acuity      Most Recent Value   L Pupil Size (mm)  3   R Pupil Size (mm)  3   L Pupil Shape  Round   R Pupil Shape  Round          ED Medications  Medications   potassium chloride (K-DUR,KLOR-CON) CR tablet 40 mEq (40 mEq Oral Given 7/14/21 1330)       Diagnostic Studies  Results Reviewed     Procedure Component Value Units Date/Time    Protein / creatinine ratio, urine [[id number]]  (Abnormal) Collected: 07/13/21 0929    Lab Status: Final result Specimen: Urine, Clean Catch Updated: 07/13/21 1921     Creatinine, Ur 18 1 mg/dL      Protein Urine Random <6 mg/dL      Prot/Creat Ratio, Ur <5 95    Basic metabolic panel [[id number]] Collected: 07/13/21 0929    Lab Status: Final result Specimen: Blood from Arm, Right Updated: 07/13/21 0948     Sodium 144 mmol/L      Potassium 3 8 mmol/L      Chloride 108 mmol/L      CO2 27 mmol/L      ANION GAP 9 mmol/L      BUN 12 mg/dL      Creatinine 1 12 mg/dL      Glucose 94 mg/dL      Calcium 9 0 mg/dL      eGFR 43 ml/min/1 73sq m     Narrative:      Katey guidelines for Chronic Kidney Disease (CKD):     Stage 1 with normal or high GFR (GFR > 90 mL/min/1 73 square meters)    Stage 2 Mild CKD (GFR = 60-89 mL/min/1 73 square meters)    Stage 3A Moderate CKD (GFR = 45-59 mL/min/1 73 square meters)    Stage 3B Moderate CKD (GFR = 30-44 mL/min/1 73 square meters)    Stage 4 Severe CKD (GFR = 15-29 mL/min/1 73 square meters)    Stage 5 End Stage CKD (GFR <15 mL/min/1 73 square meters)  Note: GFR calculation is accurate only with a steady state creatinine    CBC and differential [[id number]] Collected: 07/13/21 0617    Lab Status: Final result Specimen: Blood from Arm, Right Updated: 07/13/21 0627     WBC 5 67 Thousand/uL      RBC 4 13 Million/uL      Hemoglobin 12 5 g/dL      Hematocrit 37 5 %      MCV 91 fL      MCH 30 3 pg      MCHC 33 3 g/dL      RDW 15 1 %      MPV 9 3 fL      Platelets 671 Thousands/uL      nRBC 0 /100 WBCs      Neutrophils Relative 60 %      Immat GRANS % 0 %      Lymphocytes Relative 25 %      Monocytes Relative 10 %      Eosinophils Relative 4 %      Basophils Relative 1 %      Neutrophils Absolute 3 40 Thousands/µL      Immature Grans Absolute 0 02 Thousand/uL      Lymphocytes Absolute 1 44 Thousands/µL      Monocytes Absolute 0 55 Thousand/µL      Eosinophils Absolute 0 23 Thousand/µL      Basophils Absolute 0 03 Thousands/µL     Urine Microscopic [[id number]]  (Abnormal) Collected: 07/12/21 2112    Lab Status: Final result Specimen: Urine, Clean Catch Updated: 07/12/21 2145     RBC, UA 0-1 /hpf      WBC, UA 2-4 /hpf      Epithelial Cells Moderate /hpf      Bacteria, UA Occasional /hpf     Narrative:      Lab  Received  2 ml urine sample      UA (URINE) with reflex to Scope [[id number]]  (Abnormal) Collected: 07/12/21 2112    Lab Status: Final result Specimen: Urine, Clean Catch Updated: 07/12/21 2137     Color, UA Yellow     Clarity, UA Slightly Cloudy     Specific [place], UA 1 020     pH, UA 6 0     Leukocytes, UA Small     Nitrite, UA Negative     Protein, UA Negative mg/dl      Glucose, UA Negative mg/dl      Ketones, UA Negative mg/dl      Urobilinogen, UA 0 2 E U /dl      Bilirubin, UA Negative     Blood, UA Negative    Comprehensive metabolic panel [[id number]]  (Abnormal) Collected: 07/12/21 1912    Lab Status: Final result Specimen: Blood from Arm, Right Updated: 07/12/21 1943     Sodium 141 mmol/L      Potassium 4 8 mmol/L      Chloride 106 mmol/L      CO2 25 mmol/L      ANION GAP 10 mmol/L      BUN 14 mg/dL      Creatinine 1 02 mg/dL      Glucose 99 mg/dL      Calcium 9 2 mg/dL      AST 40 U/L      ALT 13 U/L      Alkaline Phosphatase 96 U/L      Total Protein 7 6 g/dL      Albumin 3 7 g/dL      Total Bilirubin 1 11 mg/dL      eGFR 48 ml/min/1 73sq m     Narrative:      National Kidney Disease Foundation guidelines for Chronic Kidney Disease (CKD):     Stage 1 with normal or high GFR (GFR > 90 mL/min/1 73 square meters)    Stage 2 Mild CKD (GFR = 60-89 mL/min/1 73 square meters)    Stage 3A Moderate CKD (GFR = 45-59 mL/min/1 73 square meters)    Stage 3B Moderate CKD (GFR = 30-44 mL/min/1 73 square meters)    Stage 4 Severe CKD (GFR = 15-29 mL/min/1 73 square meters)    Stage 5 End Stage CKD (GFR <15 mL/min/1 73 square meters)  Note: GFR calculation is accurate only with a steady state creatinine    NT-BNP PRO [[id number]]  (Abnormal) Collected: 07/12/21 1912    Lab Status: Final result Specimen: Blood from Arm, Right Updated: 07/12/21 1943     NT-proBNP 957 pg/mL     Troponin I [[id number]]  (Normal) Collected: 07/12/21 1912    Lab Status: Final result Specimen: Blood from Arm, Right Updated: 07/12/21 1942     Troponin I <0 02 ng/mL     CBC and differential [[id number]] Collected: 07/12/21 1912    Lab Status: Final result Specimen: Blood from Arm, Right Updated: 07/12/21 1920     WBC 7 82 Thousand/uL      RBC 4 78 Million/uL      Hemoglobin 14 2 g/dL      Hematocrit 43 3 %      MCV 91 fL      MCH 29 7 pg      MCHC 32 8 g/dL      RDW 15 1 %      MPV 8 9 fL      Platelets 840 Thousands/uL      nRBC 0 /100 WBCs      Neutrophils Relative 68 %      Immat GRANS % 0 %      Lymphocytes Relative 23 %      Monocytes Relative 7 %      Eosinophils Relative 2 %      Basophils Relative 0 %      Neutrophils Absolute 5 32 Thousands/µL      Immature Grans Absolute 0 02 Thousand/uL Lymphocytes Absolute 1 76 Thousands/µL      Monocytes Absolute 0 53 Thousand/µL      Eosinophils Absolute 0 17 Thousand/µL      Basophils Absolute 0 02 Thousands/µL                  VAS lower limb venous duplex study, complete bilateral   Final Result by Marla Stapleton DO (07/14 1651)      XR chest 1 view portable   Final Result by Luisana Belcher DO (07/13 6783)      No acute cardiopulmonary disease  Workstation performed: [id number]                    Procedures  Procedures         ED Course               Identification of Seniors at Risk      Most Recent Value   (ISAR) Identification of Seniors at Risk   Before the illness or injury that brought you to the Emergency, did you need someone to help you on a regular basis? 0 Filed at: 07/12/2021 1647   In the last 24 hours, have you needed more help than usual?  0 Filed at: 07/12/2021 1647   Have you been hospitalized for one or more nights during the past 6 months? 0 Filed at: 07/12/2021 1647   In general, do you see well?  0 Filed at: 07/12/2021 1647   In general, do you have serious problems with your memory? 0 Filed at: 07/12/2021 1647   Do you take more than three different medications every day? 1 Filed at: 07/12/2021 1647   ISAR Score  1 Filed at: 07/12/2021 1647                    SBIRT 22yo+      Most Recent Value   SBIRT (25 yo +)   In order to provide better care to our patients, we are screening all of our patients for alcohol and drug use  Would it be okay to ask you these screening questions? Yes Filed at: 07/12/2021 1858   Initial Alcohol Screen: US AUDIT-C    1  How often do you have a drink containing alcohol?  0 Filed at: 07/12/2021 1858   2  How many drinks containing alcohol do you have on a typical day you are drinking? 0 Filed at: 07/12/2021 1858   3a  Male UNDER 65: How often do you have five or more drinks on one occasion? 0 Filed at: 07/12/2021 1858   3b  FEMALE Any Age, or MALE 65+:  How often do you have 4 or more drinks on one occassion? 0 Filed at: 07/12/2021 1858   Audit-C Score  0 Filed at: 07/12/2021 1858   STEFANIE: How many times in the past year have you    Used an illegal drug or used a prescription medication for non-medical reasons? Never Filed at: 07/12/2021 1858                    [facility]  Number of Diagnoses or Management Options  Leg swelling  Diagnosis management comments: 79 yo f with anasarca, will check labs, ekg, trop, cxr, likely admit  Disposition  Final diagnoses:   Leg swelling     Time reflects when diagnosis was documented in both MDM as applicable and the Disposition within this note     Time User Action Codes Description Comment    7/12/2021  8:32 PM Erma Vaca Add [M79 89] Leg swelling     7/14/2021 10:43 AM Joey Tineo Add [R60 0] Localized edema     7/14/2021 10:43 AM Glenn Vilchis Modify [R60 0] Localized edema       ED Disposition     ED Disposition Condition Date/Time Comment    Admit Stable Mon Jul 12, 2021  8:32 PM Case was discussed with PARUL and the patient's admission status was agreed to be Admission Status: inpatient status to the service of Dr Emelyn Oviedo           Follow-up Information     Follow up With Specialties Details Why Contact Info    Boone Soto  Internal Medicine, Nurse Practitioner Schedule an appointment as soon as possible for a visit in 1 week(s)  Rafael Mason [phone]            Discharge Medication List as of 7/14/2021  3:19 PM      START taking these medications    Details   potassium chloride (MICRO-K) 10 MEQ CR capsule Take 2 capsules (20 mEq total) by mouth daily Take with lasix, Starting Wed 7/14/2021, Normal         CONTINUE these medications which have CHANGED    Details   furosemide (LASIX) 20 mg tablet Take 1 tablet (20 mg total) by mouth daily as needed (increased swelling), Starting Wed 7/14/2021, No Print         CONTINUE these medications which have NOT CHANGED    Details   Acetaminophen (Tylenol) 325 MG CAPS Take by mouth 2 (two) times a day as needed, Historical Med      Ascorbic Acid (VITAMIN C PO) Take by mouth, Historical Med      Cyanocobalamin (B-12 PO) Take by mouth, Historical Med      Multiple Vitamins-Minerals (PreserVision AREDS 2+Multi Vit) CAPS Take by mouth, Historical Med      amoxicillin (AMOXIL) 500 MG tablet Take 500 mg by mouth 2 (two) times a day Before dental procedures, Historical Med           Outpatient Discharge Orders   Discharge Diet     Activity as tolerated     Call provider for:       PDMP Review     None          ED Provider  Electronically Signed by           Dasha Cerda MD  07/24/21 1891

## 2021-08-06 ENCOUNTER — APPOINTMENT (OUTPATIENT)
Dept: RADIOLOGY | Facility: CLINIC | Age: 86
End: 2021-08-06
Payer: MEDICARE

## 2021-08-06 DIAGNOSIS — M10.9 GOUT OF RIGHT FOOT, UNSPECIFIED CAUSE, UNSPECIFIED CHRONICITY: ICD-10-CM

## 2021-08-06 PROCEDURE — 73630 X-RAY EXAM OF FOOT: CPT

## 2021-09-20 ENCOUNTER — OFFICE VISIT (OUTPATIENT)
Dept: INTERNAL MEDICINE CLINIC | Facility: CLINIC | Age: 86
End: 2021-09-20
Payer: MEDICARE

## 2021-09-20 VITALS
DIASTOLIC BLOOD PRESSURE: 78 MMHG | OXYGEN SATURATION: 97 % | SYSTOLIC BLOOD PRESSURE: 116 MMHG | BODY MASS INDEX: 32.54 KG/M2 | HEIGHT: 62 IN | HEART RATE: 88 BPM

## 2021-09-20 DIAGNOSIS — R03.0 ELEVATED BLOOD PRESSURE READING: ICD-10-CM

## 2021-09-20 DIAGNOSIS — R19.7 DIARRHEA IN ADULT PATIENT: ICD-10-CM

## 2021-09-20 DIAGNOSIS — Z00.00 MEDICARE ANNUAL WELLNESS VISIT, SUBSEQUENT: Primary | ICD-10-CM

## 2021-09-20 DIAGNOSIS — M1A.0711 CHRONIC IDIOPATHIC GOUT INVOLVING TOE OF RIGHT FOOT WITH TOPHUS: ICD-10-CM

## 2021-09-20 PROBLEM — M1A.0710 CHRONIC IDIOPATHIC GOUT INVOLVING TOE OF RIGHT FOOT: Status: ACTIVE | Noted: 2021-09-20

## 2021-09-20 PROCEDURE — G0438 PPPS, INITIAL VISIT: HCPCS | Performed by: NURSE PRACTITIONER

## 2021-09-20 PROCEDURE — 99214 OFFICE O/P EST MOD 30 MIN: CPT | Performed by: NURSE PRACTITIONER

## 2021-09-20 RX ORDER — PREDNISONE 10 MG/1
TABLET ORAL
Qty: 30 TABLET | Refills: 0 | Status: SHIPPED | OUTPATIENT
Start: 2021-09-20 | End: 2022-03-21 | Stop reason: ALTCHOICE

## 2021-09-20 NOTE — PROGRESS NOTES
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  -     predniSONE 10 mg tablet; Take 4 tabs po QD x 3 days then 3 tabs po QD x 3 days then 2 tabs po QD x 3 days then 1 tab po QD x 3 days then stop    Diarrhea in adult patient  -     AFB Smear, Stool; Future  -     Clostridium difficile toxin by PCR with EIA; Future  -     Cryptosporidium Smear; Future  -     Fecal fat, qualitative; Future  -     Giardia antigen; Future  -     Lactoferrin, fecal, quantitative; Future  -     Norovirus, RT-PCR; Future  -     Occult Blood, Fecal Immunochemical; Future  -     Ova and parasite examination; Future  -     Stool Enteric Bacterial Panel by PCR; Future  -     Vibrio culture, stool; Future  -     White Blood Cells, Stool by Gram Stain; Future  -     Yersinia culture, stool; Future    Elevated blood pressure reading  -     CBC and differential; Future  -     Comprehensive metabolic panel; Future    Other orders  -     TURMERIC-GINGER PO; Take by mouth    The patient was counseled regarding instructions for management, risk factor reductions, patient and family education,impressions, risks and benefits of treatment options, side effects of medications, importance of compliance with treatment  The treatment plan was reviewed with the patient/guardian and patient/guardian understands and agrees with the treatment plan          Current Outpatient Medications:     Acetaminophen (Tylenol) 325 MG CAPS, Take by mouth 2 (two) times a day as needed, Disp: , Rfl:     amoxicillin (AMOXIL) 500 MG tablet, Take 500 mg by mouth 2 (two) times a day Before dental procedures , Disp: , Rfl:     fluticasone (FLONASE) 50 mcg/act nasal spray, 1 spray into each nostril daily, Disp: 9 9 mL, Rfl: 5    furosemide (LASIX) 20 mg tablet, Take 1 tablet (20 mg total) by mouth daily as needed (increased swelling), Disp: 90 tablet, Rfl: 0    Multiple Vitamins-Minerals (PreserVision AREDS 2+Multi Vit) CAPS, Take by mouth, Disp: , Rfl:     potassium chloride (MICRO-K) 10 MEQ CR capsule, Take 2 capsules (20 mEq total) by mouth daily Take with lasix, Disp: 30 capsule, Rfl: 0    TURMERIC-GINGER PO, Take by mouth, Disp: , Rfl:     predniSONE 10 mg tablet, Take 4 tabs po QD x 3 days then 3 tabs po QD x 3 days then 2 tabs po QD x 3 days then 1 tab po QD x 3 days then stop, Disp: 30 tablet, Rfl: 0    Subjective:      Patient ID: Cornelio Marino is a 80 y o  female  Dx with gout right toe by podiatrist last month, no treatment given  Having diarrhea, not sure if this is a chronic issue  The following portions of the patient's history were reviewed and updated as appropriate:   She has a past medical history of Breast cancer ([address] )  ,  does not have any pertinent problems on file  ,   has a past surgical history that includes EAR SURGERY; Hip surgery; and Knee surgery  ,  family history is not on file  ,   reports that she has been smoking  She has never used smokeless tobacco  She reports current alcohol use  She reports that she does not use drugs  ,  has No Known Allergies       Review of Systems   Constitutional: Negative  Respiratory: Negative  Cardiovascular: Positive for leg swelling  Musculoskeletal: Positive for gait problem  (+) right second toe tophus, swelling right great toe  Psychiatric/Behavioral: Negative            Objective:  /78 (BP Location: Right arm, Patient Position: Sitting, Cuff Size: Adult)   Pulse 88   Ht 5' 2" (1 575 m)   SpO2 97%   BMI 32 54 kg/m²     Lab Review  No visits with results within 2 Month(s) from this visit     Latest known visit with results is:   Admission on 07/12/2021, Discharged on 07/14/2021   Component Date Value    WBC 07/12/2021 7 82     RBC 07/12/2021 4 78     Hemoglobin 07/12/2021 14 2     Hematocrit 07/12/2021 43 3     MCV 07/12/2021 91     MCH 07/12/2021 29 7     MCHC 07/12/2021 32 8     RDW 07/12/2021 15 1     MPV 07/12/2021 8 9     Platelets 01/31/2520 239     nRBC 07/12/2021 0     Neutrophils Relative 07/12/2021 68     Immat GRANS % 07/12/2021 0     Lymphocytes Relative 07/12/2021 23     Monocytes Relative 07/12/2021 7     Eosinophils Relative 07/12/2021 2     Basophils Relative 07/12/2021 0     Neutrophils Absolute 07/12/2021 5 32     Immature Grans Absolute 07/12/2021 0 02     Lymphocytes Absolute 07/12/2021 1 76     Monocytes Absolute 07/12/2021 0 53     Eosinophils Absolute 07/12/2021 0 17     Basophils Absolute 07/12/2021 0 02     Sodium 07/12/2021 141     Potassium 07/12/2021 4 8     Chloride 07/12/2021 106     CO2 07/12/2021 25     ANION GAP 07/12/2021 10     BUN 07/12/2021 14     Creatinine 07/12/2021 1 02     Glucose 07/12/2021 99     Calcium 07/12/2021 9 2     AST 07/12/2021 40     ALT 07/12/2021 13     Alkaline Phosphatase 07/12/2021 96     Total Protein 07/12/2021 7 6     Albumin 07/12/2021 3 7     Total Bilirubin 07/12/2021 1 11*    eGFR 07/12/2021 48     Troponin I 07/12/2021 <0 02     NT-proBNP 07/12/2021 957*    Color, UA 07/12/2021 Yellow     Clarity, UA 07/12/2021 Slightly Cloudy     Specific Gravity, UA 07/12/2021 1 020     pH, UA 07/12/2021 6 0     Leukocytes, UA 07/12/2021 Small*    Nitrite, UA 07/12/2021 Negative     Protein, UA 07/12/2021 Negative     Glucose, UA 07/12/2021 Negative     Ketones, UA 07/12/2021 Negative     Urobilinogen, UA 07/12/2021 0 2     Bilirubin, UA 07/12/2021 Negative     Blood, UA 07/12/2021 Negative     Ventricular Rate 07/12/2021 72     Atrial Rate 07/12/2021 72     NE Interval 07/12/2021 344     QRSD Interval 07/12/2021 132     QT Interval 07/12/2021 432     QTC Interval 07/12/2021 473     P Axis 07/12/2021 53     QRS Axis 07/12/2021 -54     T Wave Axis 07/12/2021 29     RBC, UA 07/12/2021 0-1     WBC, UA 07/12/2021 2-4     Epithelial Cells 07/12/2021 Moderate*    Bacteria, UA 07/12/2021 Occasional     Sodium 07/13/2021 144     Potassium 07/13/2021 3 8     Chloride 07/13/2021 108     CO2 07/13/2021 27     ANION GAP 07/13/2021 9     BUN 07/13/2021 12     Creatinine 07/13/2021 1 12     Glucose 07/13/2021 94     Calcium 07/13/2021 9 0     eGFR 07/13/2021 43     WBC 07/13/2021 5 67     RBC 07/13/2021 4 13     Hemoglobin 07/13/2021 12 5     Hematocrit 07/13/2021 37 5     MCV 07/13/2021 91     MCH 07/13/2021 30 3     MCHC 07/13/2021 33 3     RDW 07/13/2021 15 1     MPV 07/13/2021 9 3     Platelets 64/01/3881 198     nRBC 07/13/2021 0     Neutrophils Relative 07/13/2021 60     Immat GRANS % 07/13/2021 0     Lymphocytes Relative 07/13/2021 25     Monocytes Relative 07/13/2021 10     Eosinophils Relative 07/13/2021 4     Basophils Relative 07/13/2021 1     Neutrophils Absolute 07/13/2021 3 40     Immature Grans Absolute 07/13/2021 0 02     Lymphocytes Absolute 07/13/2021 1 44     Monocytes Absolute 07/13/2021 0 55     Eosinophils Absolute 07/13/2021 0 23     Basophils Absolute 07/13/2021 0 03     Creatinine, Ur 07/13/2021 18 1     Protein Urine Random 07/13/2021 <6     Prot/Creat Ratio, Ur 07/13/2021 <0 33*    POC Glucose 07/13/2021 99     WBC 07/14/2021 7 62     RBC 07/14/2021 4 25     Hemoglobin 07/14/2021 12 7     Hematocrit 07/14/2021 38 6     MCV 07/14/2021 91     MCH 07/14/2021 29 9     MCHC 07/14/2021 32 9     RDW 07/14/2021 14 6     Platelets 42/73/3544 220     MPV 07/14/2021 9 1     Sodium 07/14/2021 143     Potassium 07/14/2021 3 4*    Chloride 07/14/2021 108     CO2 07/14/2021 27     ANION GAP 07/14/2021 8     BUN 07/14/2021 16     Creatinine 07/14/2021 1 25     Glucose 07/14/2021 98     Calcium 07/14/2021 8 5     Corrected Calcium 07/14/2021 9 5     AST 07/14/2021 16     ALT 07/14/2021 10*    Alkaline Phosphatase 07/14/2021 72     Total Protein 07/14/2021 5 7*    Albumin 07/14/2021 2 7*    Total Bilirubin 07/14/2021 0 82     eGFR 07/14/2021 37         Imaging: No results found  Physical Exam  Constitutional:       Appearance: She is well-developed  Cardiovascular:      Rate and Rhythm: Normal rate and regular rhythm  Heart sounds: Normal heart sounds  Pulmonary:      Effort: Pulmonary effort is normal       Breath sounds: Normal breath sounds  Musculoskeletal:         General: Deformity present  Right lower leg: Edema present  Left lower leg: Edema present  Comments: Right great toe swelling   Neurological:      Mental Status: She is alert and oriented to person, place, and time  Deep Tendon Reflexes: Reflexes are normal and symmetric  Psychiatric:         Behavior: Behavior normal          Thought Content:  Thought content normal          Judgment: Judgment normal

## 2021-09-20 NOTE — PROGRESS NOTES
Assessment and Plan:     Problem List Items Addressed This Visit     None          Depression Screening and Follow-up Plan:   Patient was screened for depression during today's encounter  They screened negative with a PHQ-2 score of 0  Tobacco Cessation Counseling: Tobacco cessation counseling was provided  The patient is sincerely urged to quit consumption of tobacco  She is not ready to quit tobacco      Preventive health issues were discussed with patient, and age appropriate screening tests were ordered as noted in patient's After Visit Summary  Personalized health advice and appropriate referrals for health education or preventive services given if needed, as noted in patient's After Visit Summary  History of Present Illness:     Patient presents for Medicare Annual Wellness visit    Patient Care Team:  Shayy Charles as PCP - General (Internal Medicine)     Problem List:     Patient Active Problem List   Diagnosis    Localized edema    Wound of skin    Anasarca    Elevated blood pressure reading    Rhinorrhea      Past Medical and Surgical History:     Past Medical History:   Diagnosis Date    Breast cancer ([facility] [address] )      Past Surgical History:   Procedure Laterality Date    EAR SURGERY      HIP SURGERY      KNEE SURGERY        Family History:     No family history on file  Social History:     Social History     Socioeconomic History    Marital status:      Spouse name: None    Number of children: None    Years of education: None    Highest education level: None   Occupational History    None   Tobacco Use    Smoking status: Current Some Day Smoker    Smokeless tobacco: Never Used   Vaping Use    Vaping Use: Never used   Substance and Sexual Activity    Alcohol use:  Yes    Drug use: Never    Sexual activity: None   Other Topics Concern    None   Social History Narrative    None     Social Determinants of Health     Financial Resource Strain:     Difficulty of Paying Living Expenses:    Food Insecurity:     Worried About [address] in the Last Year:     [facility] in the Last Year:    Transportation Needs:     Lack of Transportation (Medical):  Lack of Transportation (Non-Medical):    Physical Activity:     Days of Exercise per Week:     Minutes of Exercise per Session:    Stress:     Feeling of Stress :    Social Connections:     Frequency of Communication with Friends and Family:     Frequency of Social Gatherings with Friends and Family:     Attends Islam Services:     Active Member of Clubs or Organizations:     Attends Club or Organization Meetings:     Marital Status:    Intimate Partner Violence:     Fear of Current or Ex-Partner:     Emotionally Abused:     Physically Abused:     Sexually Abused:       Medications and Allergies:     Current Outpatient Medications   Medication Sig Dispense Refill    Acetaminophen (Tylenol) 325 MG CAPS Take by mouth 2 (two) times a day as needed      amoxicillin (AMOXIL) 500 MG tablet Take 500 mg by mouth 2 (two) times a day Before dental procedures       fluticasone (FLONASE) 50 mcg/act nasal spray 1 spray into each nostril daily 9 9 mL 5    furosemide (LASIX) 20 mg tablet Take 1 tablet (20 mg total) by mouth daily as needed (increased swelling) 90 tablet 0    Multiple Vitamins-Minerals (PreserVision AREDS 2+Multi Vit) CAPS Take by mouth      potassium chloride (MICRO-K) 10 MEQ CR capsule Take 2 capsules (20 mEq total) by mouth daily Take with lasix 30 capsule 0    TURMERIC-GINGER PO Take by mouth      Ascorbic Acid (VITAMIN C PO) Take by mouth (Patient not taking: Reported on 9/20/2021)      Cyanocobalamin (B-12 PO) Take by mouth (Patient not taking: Reported on 9/20/2021)       No current facility-administered medications for this visit       No Known Allergies   Immunizations:     Immunization History   Administered Date(s) Administered    SARS-CoV-2 / COVID-19 mRNA IM (Moderna) 01/26/2021, 03/04/2021      Health Maintenance: There are no preventive care reminders to display for this patient  Topic Date Due    Pneumococcal Vaccine: 65+ Years (1 of 2 - PPSV23) Never done    DTaP,Tdap,and Td Vaccines (1 - Tdap) Never done    Influenza Vaccine (1) 09/01/2021      Medicare Health Risk Assessment:     /78 (BP Location: Right arm, Patient Position: Sitting, Cuff Size: Adult)   Pulse 88   Ht 5' 2" (1 575 m)   SpO2 97%   BMI 32 54 kg/m²      Mando Glasgow is here for her Subsequent Wellness visit  Health Risk Assessment:   Patient rates overall health as good  Patient feels that their physical health rating is same  Patient is satisfied with their life  Eyesight was rated as slightly worse  Hearing was rated as slightly worse  Patient feels that their emotional and mental health rating is same  Patients states they are never, rarely angry  Patient states they are sometimes unusually tired/fatigued  Pain experienced in the last 7 days has been some  Patient's pain rating has been 3/10  Patient states that she has experienced no weight loss or gain in last 6 months  Depression Screening:   PHQ-2 Score: 0      Fall Risk Screening: In the past year, patient has experienced: no history of falling in past year      Urinary Incontinence Screening:   Patient has not leaked urine accidently in the last six months  Home Safety:  Patient has trouble with stairs inside or outside of their home  Patient has working smoke alarms and has working carbon monoxide detector  Home safety hazards include: none  Nutrition:   Current diet is Regular  Medications:   Patient is currently taking over-the-counter supplements  OTC medications include: see medication list  Patient is able to manage medications       Activities of Daily Living (ADLs)/Instrumental Activities of Daily Living (IADLs):   Walk and transfer into and out of bed and chair?: Yes  Dress and groom yourself?: Yes Bathe or shower yourself?: No    Feed yourself? Yes  Do your laundry/housekeeping?: No  Manage your money, pay your bills and track your expenses?: No  Make your own meals?: No    Do your own shopping?: No    Previous Hospitalizations:   Any hospitalizations or ED visits within the last 12 months?: Yes      Advance Care Planning:   Living will: Yes    Durable POA for healthcare:  Yes    Advanced directive: Yes      PREVENTIVE SCREENINGS      Cardiovascular Screening:    General: Screening Current      Diabetes Screening:     General: Screening Current      Colorectal Cancer Screening:     General: Screening Not Indicated      Breast Cancer Screening:     General: History Breast Cancer      Cervical Cancer Screening:    General: Screening Not Indicated      Osteoporosis Screening:    General: Risks and Benefits Discussed      Abdominal Aortic Aneurysm (AAA) Screening:        General: Screening Not Indicated      Lung Cancer Screening:     General: Screening Not Indicated      Hepatitis C Screening:    General: Screening Not Indicated      KEVIN Soto

## 2021-10-18 ENCOUNTER — TELEPHONE (OUTPATIENT)
Dept: INTERNAL MEDICINE CLINIC | Facility: CLINIC | Age: 86
End: 2021-10-18

## 2021-10-20 ENCOUNTER — CLINICAL SUPPORT (OUTPATIENT)
Dept: INTERNAL MEDICINE CLINIC | Facility: CLINIC | Age: 86
End: 2021-10-20

## 2021-10-20 VITALS — DIASTOLIC BLOOD PRESSURE: 80 MMHG | SYSTOLIC BLOOD PRESSURE: 133 MMHG

## 2021-10-20 DIAGNOSIS — I95.9 HYPOTENSION, UNSPECIFIED HYPOTENSION TYPE: Primary | ICD-10-CM

## 2021-11-10 ENCOUNTER — TELEPHONE (OUTPATIENT)
Dept: INTERNAL MEDICINE CLINIC | Facility: CLINIC | Age: 86
End: 2021-11-10

## 2021-11-10 ENCOUNTER — PATIENT MESSAGE (OUTPATIENT)
Dept: INTERNAL MEDICINE CLINIC | Facility: CLINIC | Age: 86
End: 2021-11-10

## 2021-11-11 ENCOUNTER — OFFICE VISIT (OUTPATIENT)
Dept: INTERNAL MEDICINE CLINIC | Facility: CLINIC | Age: 86
End: 2021-11-11
Payer: MEDICARE

## 2021-11-11 VITALS
WEIGHT: 184 LBS | OXYGEN SATURATION: 95 % | TEMPERATURE: 98 F | HEIGHT: 62 IN | SYSTOLIC BLOOD PRESSURE: 116 MMHG | DIASTOLIC BLOOD PRESSURE: 76 MMHG | BODY MASS INDEX: 33.86 KG/M2 | HEART RATE: 74 BPM

## 2021-11-11 DIAGNOSIS — I95.9 HYPOTENSION, UNSPECIFIED HYPOTENSION TYPE: Primary | ICD-10-CM

## 2021-11-11 PROCEDURE — 99213 OFFICE O/P EST LOW 20 MIN: CPT | Performed by: INTERNAL MEDICINE

## 2022-03-21 ENCOUNTER — OFFICE VISIT (OUTPATIENT)
Dept: INTERNAL MEDICINE CLINIC | Facility: CLINIC | Age: 87
End: 2022-03-21
Payer: MEDICARE

## 2022-03-21 VITALS
HEART RATE: 86 BPM | DIASTOLIC BLOOD PRESSURE: 78 MMHG | TEMPERATURE: 97.8 F | BODY MASS INDEX: 33.6 KG/M2 | WEIGHT: 182.6 LBS | HEIGHT: 62 IN | SYSTOLIC BLOOD PRESSURE: 126 MMHG | OXYGEN SATURATION: 96 % | RESPIRATION RATE: 16 BRPM

## 2022-03-21 DIAGNOSIS — R60.0 LOCALIZED EDEMA: ICD-10-CM

## 2022-03-21 DIAGNOSIS — R79.9 ABNORMAL FINDING OF BLOOD CHEMISTRY, UNSPECIFIED: ICD-10-CM

## 2022-03-21 DIAGNOSIS — M79.89 LEG SWELLING: ICD-10-CM

## 2022-03-21 DIAGNOSIS — R03.0 ELEVATED BLOOD PRESSURE READING: ICD-10-CM

## 2022-03-21 DIAGNOSIS — M1A.0711 CHRONIC IDIOPATHIC GOUT INVOLVING TOE OF RIGHT FOOT WITH TOPHUS: ICD-10-CM

## 2022-03-21 DIAGNOSIS — M19.079 OSTEOARTHRITIS OF TOE: Primary | ICD-10-CM

## 2022-03-21 PROBLEM — R19.7 DIARRHEA IN ADULT PATIENT: Status: RESOLVED | Noted: 2021-09-20 | Resolved: 2022-03-21

## 2022-03-21 PROCEDURE — 99214 OFFICE O/P EST MOD 30 MIN: CPT | Performed by: NURSE PRACTITIONER

## 2022-03-21 RX ORDER — POTASSIUM CHLORIDE 750 MG/1
CAPSULE, EXTENDED RELEASE ORAL
Qty: 180 CAPSULE | Refills: 0 | Status: SHIPPED | OUTPATIENT
Start: 2022-03-21

## 2022-03-21 RX ORDER — FUROSEMIDE 20 MG/1
20 TABLET ORAL DAILY PRN
Qty: 90 TABLET | Refills: 0 | Status: SHIPPED | OUTPATIENT
Start: 2022-03-21 | End: 2022-07-18 | Stop reason: SDUPTHER

## 2022-03-21 NOTE — PROGRESS NOTES
Assessment/Plan:       BMI Counseling: Body mass index is 33 4 kg/m²  The BMI is above normal  Nutrition recommendations include decreasing portion sizes, encouraging healthy choices of fruits and vegetables, decreasing fast food intake, consuming healthier snacks, limiting drinks that contain sugar, moderation in carbohydrate intake, increasing intake of lean protein, reducing intake of saturated and trans fat and reducing intake of cholesterol  Exercise recommendations include exercising 3-5 times per week  No pharmacotherapy was ordered  Patient referred to PCP  Rationale for BMI follow-up plan is due to patient being overweight or obese  Depression Screening and Follow-up Plan: Patient was screened for depression during today's encounter  They screened negative with a PHQ-2 score of 0       1  B/L LE edema- Continue Furosemide and Potassium as needed  2  Osteoarthritis of right great toe- Take Extra Strength Tylenol as needed  3  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- No intervention needed  Follow up in six months, labs prior  Diagnoses and all orders for this visit:    Osteoarthritis of toe    Leg swelling  -     potassium chloride (MICRO-K) 10 MEQ CR capsule; Take 2 tabs po when you take Furosemide   -     CBC and differential; Future  -     Comprehensive metabolic panel; Future    Localized edema  -     furosemide (LASIX) 20 mg tablet; Take 1 tablet (20 mg total) by mouth daily as needed (increased swelling)    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    Elevated blood pressure reading  -     Lipid panel; Future    Abnormal finding of blood chemistry, unspecified   -     Lipid panel; Future        The patient was counseled regarding instructions for management, risk factor reductions, patient and family education,impressions, risks and benefits of treatment options, side effects of medications, importance of compliance with treatment   The treatment plan was reviewed with the patient/guardian and patient/guardian understands and agrees with the treatment plan  Current Outpatient Medications:     Acetaminophen (Tylenol) 325 MG CAPS, Take by mouth 2 (two) times a day as needed, Disp: , Rfl:     fluticasone (FLONASE) 50 mcg/act nasal spray, 1 spray into each nostril daily, Disp: 9 9 mL, Rfl: 5    furosemide (LASIX) 20 mg tablet, Take 1 tablet (20 mg total) by mouth daily as needed (increased swelling), Disp: 90 tablet, Rfl: 0    Multiple Vitamins-Minerals (PreserVision AREDS 2+Multi Vit) CAPS, Take by mouth, Disp: , Rfl:     potassium chloride (MICRO-K) 10 MEQ CR capsule, Take 2 tabs po when you take Furosemide  , Disp: 180 capsule, Rfl: 0    TURMERIC-GINGER PO, Take by mouth, Disp: , Rfl:     amoxicillin (AMOXIL) 500 MG tablet, Take 500 mg by mouth 2 (two) times a day Before dental procedures  (Patient not taking: Reported on 3/21/2022 ), Disp: , Rfl:     Subjective:      Patient ID: Zan Castro is a 80 y o  female  HPI    The following portions of the patient's history were reviewed and updated as appropriate:   She has a past medical history of Arthritis (2000), Breast cancer ([address] ), Cancer ([address] ) (2010), HL (hearing loss) (2010), Hypertension, and Visual impairment (2018)  ,  does not have any pertinent problems on file  ,   has a past surgical history that includes EAR SURGERY; Hip surgery; Knee surgery; Eye surgery; and Joint replacement (?) ,  family history is not on file  ,   reports that she has been smoking cigarettes  She has a 5 00 pack-year smoking history  She has never used smokeless tobacco  She reports current alcohol use of about 4 0 standard drinks of alcohol per week  She reports that she does not use drugs  ,  has No Known Allergies       Review of Systems   Constitutional: Negative  Respiratory: Negative  Cardiovascular: Negative  Skin:        Right great toe pain   Psychiatric/Behavioral: Negative            Objective:  /78 (BP Location: Right arm, Patient Position: Sitting, Cuff Size: Adult)   Pulse 86   Temp 97 8 °F (36 6 °C) (Tympanic)   Resp 16   Ht 5' 2" (1 575 m)   Wt 82 8 kg (182 lb 9 6 oz)   SpO2 96%   BMI 33 40 kg/m²     Lab Review  No visits with results within 2 Month(s) from this visit     Latest known visit with results is:   Admission on 07/12/2021, Discharged on 07/14/2021   Component Date Value    WBC 07/12/2021 7 82     RBC 07/12/2021 4 78     Hemoglobin 07/12/2021 14 2     Hematocrit 07/12/2021 43 3     MCV 07/12/2021 91     MCH 07/12/2021 29 7     MCHC 07/12/2021 32 8     RDW 07/12/2021 15 1     MPV 07/12/2021 8 9     Platelets 66/74/5832 239     nRBC 07/12/2021 0     Neutrophils Relative 07/12/2021 68     Immat GRANS % 07/12/2021 0     Lymphocytes Relative 07/12/2021 23     Monocytes Relative 07/12/2021 7     Eosinophils Relative 07/12/2021 2     Basophils Relative 07/12/2021 0     Neutrophils Absolute 07/12/2021 5 32     Immature Grans Absolute 07/12/2021 0 02     Lymphocytes Absolute 07/12/2021 1 76     Monocytes Absolute 07/12/2021 0 53     Eosinophils Absolute 07/12/2021 0 17     Basophils Absolute 07/12/2021 0 02     Sodium 07/12/2021 141     Potassium 07/12/2021 4 8     Chloride 07/12/2021 106     CO2 07/12/2021 25     ANION GAP 07/12/2021 10     BUN 07/12/2021 14     Creatinine 07/12/2021 1 02     Glucose 07/12/2021 99     Calcium 07/12/2021 9 2     AST 07/12/2021 40     ALT 07/12/2021 13     Alkaline Phosphatase 07/12/2021 96     Total Protein 07/12/2021 7 6     Albumin 07/12/2021 3 7     Total Bilirubin 07/12/2021 1 11*    eGFR 07/12/2021 48     Troponin I 07/12/2021 <0 02     NT-proBNP 07/12/2021 957*    Color, UA 07/12/2021 Yellow     Clarity, UA 07/12/2021 Slightly Cloudy     Specific Gravity, UA 07/12/2021 1 020     pH, UA 07/12/2021 6 0     Leukocytes, UA 07/12/2021 Small*    Nitrite, UA 07/12/2021 Negative     Protein, UA 07/12/2021 Negative     Glucose, UA 07/12/2021 Negative     Ketones, UA 07/12/2021 Negative     Urobilinogen, UA 07/12/2021 0 2     Bilirubin, UA 07/12/2021 Negative     Blood, UA 07/12/2021 Negative     Ventricular Rate 07/12/2021 72     Atrial Rate 07/12/2021 72     DC Interval 07/12/2021 344     QRSD Interval 07/12/2021 132     QT Interval 07/12/2021 432     QTC Interval 07/12/2021 473     P Axis 07/12/2021 53     QRS Axis 07/12/2021 -54     T Wave Axis 07/12/2021 29     RBC, UA 07/12/2021 0-1     WBC, UA 07/12/2021 2-4     Epithelial Cells 07/12/2021 Moderate*    Bacteria, UA 07/12/2021 Occasional     Sodium 07/13/2021 144     Potassium 07/13/2021 3 8     Chloride 07/13/2021 108     CO2 07/13/2021 27     ANION GAP 07/13/2021 9     BUN 07/13/2021 12     Creatinine 07/13/2021 1 12     Glucose 07/13/2021 94     Calcium 07/13/2021 9 0     eGFR 07/13/2021 43     WBC 07/13/2021 5 67     RBC 07/13/2021 4 13     Hemoglobin 07/13/2021 12 5     Hematocrit 07/13/2021 37 5     MCV 07/13/2021 91     MCH 07/13/2021 30 3     MCHC 07/13/2021 33 3     RDW 07/13/2021 15 1     MPV 07/13/2021 9 3     Platelets 78/06/4984 198     nRBC 07/13/2021 0     Neutrophils Relative 07/13/2021 60     Immat GRANS % 07/13/2021 0     Lymphocytes Relative 07/13/2021 25     Monocytes Relative 07/13/2021 10     Eosinophils Relative 07/13/2021 4     Basophils Relative 07/13/2021 1     Neutrophils Absolute 07/13/2021 3 40     Immature Grans Absolute 07/13/2021 0 02     Lymphocytes Absolute 07/13/2021 1 44     Monocytes Absolute 07/13/2021 0 55     Eosinophils Absolute 07/13/2021 0 23     Basophils Absolute 07/13/2021 0 03     Creatinine, Ur 07/13/2021 18 1     Protein Urine Random 07/13/2021 <6     Prot/Creat Ratio, Ur 07/13/2021 <0 33*    POC Glucose 07/13/2021 99     WBC 07/14/2021 7 62     RBC 07/14/2021 4 25     Hemoglobin 07/14/2021 12 7     Hematocrit 07/14/2021 38 6     MCV 07/14/2021 91     [facility] 07/14/2021 29 9     MCHC 07/14/2021 32 9     RDW 07/14/2021 14 6     Platelets 66/05/1884 220     MPV 07/14/2021 9 1     Sodium 07/14/2021 143     Potassium 07/14/2021 3 4*    Chloride 07/14/2021 108     CO2 07/14/2021 27     ANION GAP 07/14/2021 8     BUN 07/14/2021 16     Creatinine 07/14/2021 1 25     Glucose 07/14/2021 98     Calcium 07/14/2021 8 5     Corrected Calcium 07/14/2021 9 5     AST 07/14/2021 16     ALT 07/14/2021 10*    Alkaline Phosphatase 07/14/2021 72     Total Protein 07/14/2021 5 7*    Albumin 07/14/2021 2 7*    Total Bilirubin 07/14/2021 0 82     eGFR 07/14/2021 37         Imaging: No results found  Physical Exam  Constitutional:       Appearance: She is well-developed  Cardiovascular:      Rate and Rhythm: Normal rate and regular rhythm  Heart sounds: Normal heart sounds  Pulmonary:      Effort: Pulmonary effort is normal       Breath sounds: Normal breath sounds  Musculoskeletal:      Right lower leg: Edema present  Left lower leg: Edema present  Comments: Ambulates with walker   Skin:     Comments: Lateral aspect of R great toe seems to be enlarged, skin is rough and scaly, appears to be calloused  Neurological:      Mental Status: She is alert and oriented to person, place, and time  Deep Tendon Reflexes: Reflexes are normal and symmetric  Psychiatric:         Behavior: Behavior normal          Thought Content:  Thought content normal          Judgment: Judgment normal

## 2022-03-21 NOTE — PATIENT INSTRUCTIONS
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

## 2022-07-11 ENCOUNTER — OFFICE VISIT (OUTPATIENT)
Dept: INTERNAL MEDICINE CLINIC | Facility: CLINIC | Age: 87
End: 2022-07-11
Payer: MEDICARE

## 2022-07-11 VITALS
HEIGHT: 62 IN | WEIGHT: 180.4 LBS | SYSTOLIC BLOOD PRESSURE: 118 MMHG | OXYGEN SATURATION: 95 % | BODY MASS INDEX: 33.2 KG/M2 | DIASTOLIC BLOOD PRESSURE: 72 MMHG | RESPIRATION RATE: 16 BRPM | HEART RATE: 78 BPM

## 2022-07-11 DIAGNOSIS — R94.31 ABNORMAL EKG: ICD-10-CM

## 2022-07-11 DIAGNOSIS — R06.02 SHORTNESS OF BREATH: ICD-10-CM

## 2022-07-11 DIAGNOSIS — R00.2 PALPITATIONS: ICD-10-CM

## 2022-07-11 DIAGNOSIS — R60.0 LOCALIZED EDEMA: Primary | ICD-10-CM

## 2022-07-11 PROCEDURE — 99214 OFFICE O/P EST MOD 30 MIN: CPT | Performed by: NURSE PRACTITIONER

## 2022-07-11 PROCEDURE — 93000 ELECTROCARDIOGRAM COMPLETE: CPT | Performed by: NURSE PRACTITIONER

## 2022-07-11 NOTE — PATIENT INSTRUCTIONS
Left Lower Leg Edema- continue weekly Lasix, ace bandage applied to be removed at night, keep extremity elevated when sitting  Palpations with SOB- EKG abnormal today,  labs ordered will call with results, referred to cardiology    Follow-up in 3 months

## 2022-07-11 NOTE — PROGRESS NOTES
Assessment/Plan:    1  Left Lower Leg Edema- continue weekly Lasix, ace bandage applied to be removed at night, keep extremity elevated when sitting  2  Palpations with SOB- EKG abnormal today,  labs ordered will call with results, referred to cardiology    Follow-up in 3 months     Diagnoses and all orders for this visit:    Localized edema  -     Ambulatory Referral to Cardiology; Future    Palpitations  -     TSH, 3rd generation with Free T4 reflex; Future  -     POCT ECG  -     Ambulatory Referral to Cardiology; Future    Shortness of breath  -     D-dimer, quantitative; Future  -     Ambulatory Referral to Cardiology; Future    Abnormal EKG  -     Ambulatory Referral to Cardiology; Future        The patient was counseled regarding instructions for management, risk factor reductions, patient and family education,impressions, risks and benefits of treatment options, side effects of medications, importance of compliance with treatment  The treatment plan was reviewed with the patient/guardian and patient/guardian understands and agrees with the treatment plan  Current Outpatient Medications:     Acetaminophen (Tylenol) 325 MG CAPS, Take by mouth 2 (two) times a day as needed, Disp: , Rfl:     amoxicillin (AMOXIL) 500 MG tablet, Take 500 mg by mouth 2 (two) times a day Before dental procedures, Disp: , Rfl:     furosemide (LASIX) 20 mg tablet, Take 1 tablet (20 mg total) by mouth daily as needed (increased swelling), Disp: 90 tablet, Rfl: 0    Multiple Vitamins-Minerals (PreserVision AREDS 2+Multi Vit) CAPS, Take by mouth, Disp: , Rfl:     potassium chloride (MICRO-K) 10 MEQ CR capsule, Take 2 tabs po when you take Furosemide  , Disp: 180 capsule, Rfl: 0    TURMERIC-GINGER PO, Take by mouth, Disp: , Rfl:     fluticasone (FLONASE) 50 mcg/act nasal spray, 1 spray into each nostril daily (Patient not taking: Reported on 7/11/2022), Disp: 9 9 mL, Rfl: 5    Subjective:      Patient ID: Jagruti Martínez is a 80 y o  female  History of edema left lower leg that has become worse and painful over the last few days  She also reports SOB with activity and occasional palpitations  She has been taking weekly Furosemide and has compression boots at home that she has not used in a while  She is overdue for bloodwork  The following portions of the patient's history were reviewed and updated as appropriate:   She has a past medical history of Arthritis (2000), Breast cancer ([facility] [address] ), Cancer ([facility] [address] ) (2010), HL (hearing loss) (2010), Hypertension, and Visual impairment (2018)  ,  does not have any pertinent problems on file  ,   has a past surgical history that includes EAR SURGERY; Hip surgery; Knee surgery; Eye surgery; and Joint replacement (?) ,  family history is not on file  ,   reports that she has been smoking cigarettes  She has a 5 00 pack-year smoking history  She has never used smokeless tobacco  She reports current alcohol use of about 4 0 standard drinks of alcohol per week  She reports that she does not use drugs  ,  has No Known Allergies       Review of Systems   Constitutional: Negative  Respiratory: Positive for shortness of breath  Cardiovascular: Positive for leg swelling  Left leg   Musculoskeletal: Negative  Psychiatric/Behavioral: Negative  Objective:  /72 (BP Location: Right arm, Patient Position: Sitting, Cuff Size: Adult)   Pulse 78   Resp 16   Ht 5' 2" (1 575 m)   Wt 81 8 kg (180 lb 6 4 oz)   SpO2 95%   BMI 33 00 kg/m²     Lab Review  No visits with results within 2 Month(s) from this visit     Latest known visit with results is:   Admission on 07/12/2021, Discharged on 07/14/2021   Component Date Value    WBC 07/12/2021 7 82     RBC 07/12/2021 4 78     Hemoglobin 07/12/2021 14 2     Hematocrit 07/12/2021 43 3     MCV 07/12/2021 91     MCH 07/12/2021 29 7     MCHC 07/12/2021 32 8     RDW 07/12/2021 15 1     MPV 07/12/2021 8 9     Platelets 90/78/4098 239     nRBC 07/12/2021 0     Neutrophils Relative 07/12/2021 68     Immat GRANS % 07/12/2021 0     Lymphocytes Relative 07/12/2021 23     Monocytes Relative 07/12/2021 7     Eosinophils Relative 07/12/2021 2     Basophils Relative 07/12/2021 0     Neutrophils Absolute 07/12/2021 5 32     Immature Grans Absolute 07/12/2021 0 02     Lymphocytes Absolute 07/12/2021 1 76     Monocytes Absolute 07/12/2021 0 53     Eosinophils Absolute 07/12/2021 0 17     Basophils Absolute 07/12/2021 0 02     Sodium 07/12/2021 141     Potassium 07/12/2021 4 8     Chloride 07/12/2021 106     CO2 07/12/2021 25     ANION GAP 07/12/2021 10     BUN 07/12/2021 14     Creatinine 07/12/2021 1 02     Glucose 07/12/2021 99     Calcium 07/12/2021 9 2     AST 07/12/2021 40     ALT 07/12/2021 13     Alkaline Phosphatase 07/12/2021 96     Total Protein 07/12/2021 7 6     Albumin 07/12/2021 3 7     Total Bilirubin 07/12/2021 1 11 (A)    eGFR 07/12/2021 48     Troponin I 07/12/2021 <0 02     NT-proBNP 07/12/2021 957 (A)    Color, UA 07/12/2021 Yellow     Clarity, UA 07/12/2021 Slightly Cloudy     Specific Gravity, UA 07/12/2021 1 020     pH, UA 07/12/2021 6 0     Leukocytes, UA 07/12/2021 Small (A)    Nitrite, UA 07/12/2021 Negative     Protein, UA 07/12/2021 Negative     Glucose, UA 07/12/2021 Negative     Ketones, UA 07/12/2021 Negative     Urobilinogen, UA 07/12/2021 0 2     Bilirubin, UA 07/12/2021 Negative     Occult Blood, UA 07/12/2021 Negative     Ventricular Rate 07/12/2021 72     Atrial Rate 07/12/2021 72     ND Interval 07/12/2021 344     QRSD Interval 07/12/2021 132     QT Interval 07/12/2021 432     QTC Interval 07/12/2021 473     P Axis 07/12/2021 53     QRS Axis 07/12/2021 -54     T Wave Axis 07/12/2021 29     RBC, UA 07/12/2021 0-1     WBC, UA 07/12/2021 2-4     Epithelial Cells 07/12/2021 Moderate (A)    Bacteria, UA 07/12/2021 Occasional     Sodium 07/13/2021 144     Potassium 07/13/2021 3 8     Chloride 07/13/2021 108     CO2 07/13/2021 27     ANION GAP 07/13/2021 9     BUN 07/13/2021 12     Creatinine 07/13/2021 1 12     Glucose 07/13/2021 94     Calcium 07/13/2021 9 0     eGFR 07/13/2021 43     WBC 07/13/2021 5 67     RBC 07/13/2021 4 13     Hemoglobin 07/13/2021 12 5     Hematocrit 07/13/2021 37 5     MCV 07/13/2021 91     MCH 07/13/2021 30 3     MCHC 07/13/2021 33 3     RDW 07/13/2021 15 1     MPV 07/13/2021 9 3     Platelets 43/33/8290 198     nRBC 07/13/2021 0     Neutrophils Relative 07/13/2021 60     Immat GRANS % 07/13/2021 0     Lymphocytes Relative 07/13/2021 25     Monocytes Relative 07/13/2021 10     Eosinophils Relative 07/13/2021 4     Basophils Relative 07/13/2021 1     Neutrophils Absolute 07/13/2021 3 40     Immature Grans Absolute 07/13/2021 0 02     Lymphocytes Absolute 07/13/2021 1 44     Monocytes Absolute 07/13/2021 0 55     Eosinophils Absolute 07/13/2021 0 23     Basophils Absolute 07/13/2021 0 03     Creatinine, Ur 07/13/2021 18 1     Protein Urine Random 07/13/2021 <6     Prot/Creat Ratio, Ur 07/13/2021 <0 33 (A)    POC Glucose 07/13/2021 99     WBC 07/14/2021 7 62     RBC 07/14/2021 4 25     Hemoglobin 07/14/2021 12 7     Hematocrit 07/14/2021 38 6     MCV 07/14/2021 91     MCH 07/14/2021 29 9     MCHC 07/14/2021 32 9     RDW 07/14/2021 14 6     Platelets 01/06/1441 220     MPV 07/14/2021 9 1     Sodium 07/14/2021 143     Potassium 07/14/2021 3 4 (A)    Chloride 07/14/2021 108     CO2 07/14/2021 27     ANION GAP 07/14/2021 8     BUN 07/14/2021 16     Creatinine 07/14/2021 1 25     Glucose 07/14/2021 98     Calcium 07/14/2021 8 5     Corrected Calcium 07/14/2021 9 5     AST 07/14/2021 16     ALT 07/14/2021 10 (A)    Alkaline Phosphatase 07/14/2021 72     Total Protein 07/14/2021 5 7 (A)    Albumin 07/14/2021 2 7 (A)    Total Bilirubin 07/14/2021 0 82     eGFR 07/14/2021 37         Imaging: No results found        Physical Exam  Constitutional:       Appearance: She is well-developed  Cardiovascular:      Rate and Rhythm: Normal rate  Rhythm irregular  Heart sounds: Normal heart sounds  Pulmonary:      Effort: Pulmonary effort is normal       Breath sounds: Normal breath sounds  Musculoskeletal:         General: Normal range of motion  Left lower leg: Tenderness present  Edema present  Skin:     General: Skin is warm and dry  Neurological:      Mental Status: She is alert and oriented to person, place, and time  Deep Tendon Reflexes: Reflexes are normal and symmetric  Psychiatric:         Behavior: Behavior normal          Thought Content:  Thought content normal          Judgment: Judgment normal

## 2022-07-13 ENCOUNTER — APPOINTMENT (OUTPATIENT)
Dept: LAB | Facility: CLINIC | Age: 87
End: 2022-07-13
Payer: MEDICARE

## 2022-07-13 DIAGNOSIS — R03.0 ELEVATED BLOOD PRESSURE READING: ICD-10-CM

## 2022-07-13 DIAGNOSIS — R00.2 PALPITATIONS: ICD-10-CM

## 2022-07-13 DIAGNOSIS — M79.89 LEG SWELLING: ICD-10-CM

## 2022-07-13 DIAGNOSIS — R79.9 ABNORMAL FINDING OF BLOOD CHEMISTRY, UNSPECIFIED: ICD-10-CM

## 2022-07-13 DIAGNOSIS — R19.7 DIARRHEA IN ADULT PATIENT: ICD-10-CM

## 2022-07-13 DIAGNOSIS — R06.02 SHORTNESS OF BREATH: ICD-10-CM

## 2022-07-13 LAB
ALBUMIN SERPL BCP-MCNC: 3.5 G/DL (ref 3.5–5)
ALP SERPL-CCNC: 74 U/L (ref 46–116)
ALT SERPL W P-5'-P-CCNC: 14 U/L (ref 12–78)
ANION GAP SERPL CALCULATED.3IONS-SCNC: 6 MMOL/L (ref 4–13)
AST SERPL W P-5'-P-CCNC: 23 U/L (ref 5–45)
BASOPHILS # BLD AUTO: 0.03 THOUSANDS/ΜL (ref 0–0.1)
BASOPHILS NFR BLD AUTO: 1 % (ref 0–1)
BILIRUB SERPL-MCNC: 0.6 MG/DL (ref 0.2–1)
BUN SERPL-MCNC: 18 MG/DL (ref 5–25)
CALCIUM SERPL-MCNC: 9.3 MG/DL (ref 8.3–10.1)
CHLORIDE SERPL-SCNC: 111 MMOL/L (ref 100–108)
CHOLEST SERPL-MCNC: 182 MG/DL
CO2 SERPL-SCNC: 23 MMOL/L (ref 21–32)
CREAT SERPL-MCNC: 1.28 MG/DL (ref 0.6–1.3)
D DIMER PPP FEU-MCNC: 1.36 UG/ML FEU
EOSINOPHIL # BLD AUTO: 0.17 THOUSAND/ΜL (ref 0–0.61)
EOSINOPHIL NFR BLD AUTO: 3 % (ref 0–6)
ERYTHROCYTE [DISTWIDTH] IN BLOOD BY AUTOMATED COUNT: 14.6 % (ref 11.6–15.1)
GFR SERPL CREATININE-BSD FRML MDRD: 36 ML/MIN/1.73SQ M
GLUCOSE P FAST SERPL-MCNC: 99 MG/DL (ref 65–99)
HCT VFR BLD AUTO: 43.9 % (ref 34.8–46.1)
HDLC SERPL-MCNC: 46 MG/DL
HGB BLD-MCNC: 14.2 G/DL (ref 11.5–15.4)
IMM GRANULOCYTES # BLD AUTO: 0.02 THOUSAND/UL (ref 0–0.2)
IMM GRANULOCYTES NFR BLD AUTO: 0 % (ref 0–2)
LDLC SERPL CALC-MCNC: 100 MG/DL (ref 0–100)
LYMPHOCYTES # BLD AUTO: 1.44 THOUSANDS/ΜL (ref 0.6–4.47)
LYMPHOCYTES NFR BLD AUTO: 23 % (ref 14–44)
MCH RBC QN AUTO: 30.1 PG (ref 26.8–34.3)
MCHC RBC AUTO-ENTMCNC: 32.3 G/DL (ref 31.4–37.4)
MCV RBC AUTO: 93 FL (ref 82–98)
MONOCYTES # BLD AUTO: 0.44 THOUSAND/ΜL (ref 0.17–1.22)
MONOCYTES NFR BLD AUTO: 7 % (ref 4–12)
NEUTROPHILS # BLD AUTO: 4.17 THOUSANDS/ΜL (ref 1.85–7.62)
NEUTS SEG NFR BLD AUTO: 66 % (ref 43–75)
NONHDLC SERPL-MCNC: 136 MG/DL
NRBC BLD AUTO-RTO: 0 /100 WBCS
PLATELET # BLD AUTO: 304 THOUSANDS/UL (ref 149–390)
PMV BLD AUTO: 9.6 FL (ref 8.9–12.7)
POTASSIUM SERPL-SCNC: 4.4 MMOL/L (ref 3.5–5.3)
PROT SERPL-MCNC: 6.9 G/DL (ref 6.4–8.2)
RBC # BLD AUTO: 4.72 MILLION/UL (ref 3.81–5.12)
SODIUM SERPL-SCNC: 140 MMOL/L (ref 136–145)
TRIGL SERPL-MCNC: 180 MG/DL
TSH SERPL DL<=0.05 MIU/L-ACNC: 3.68 UIU/ML (ref 0.45–4.5)
WBC # BLD AUTO: 6.27 THOUSAND/UL (ref 4.31–10.16)

## 2022-07-13 PROCEDURE — 80061 LIPID PANEL: CPT

## 2022-07-13 PROCEDURE — 36415 COLL VENOUS BLD VENIPUNCTURE: CPT

## 2022-07-13 PROCEDURE — 85025 COMPLETE CBC W/AUTO DIFF WBC: CPT

## 2022-07-13 PROCEDURE — 80053 COMPREHEN METABOLIC PANEL: CPT

## 2022-07-13 PROCEDURE — 84443 ASSAY THYROID STIM HORMONE: CPT

## 2022-07-13 PROCEDURE — 85379 FIBRIN DEGRADATION QUANT: CPT

## 2022-07-14 ENCOUNTER — APPOINTMENT (EMERGENCY)
Dept: CT IMAGING | Facility: HOSPITAL | Age: 87
End: 2022-07-14
Payer: MEDICARE

## 2022-07-14 ENCOUNTER — APPOINTMENT (EMERGENCY)
Dept: VASCULAR ULTRASOUND | Facility: HOSPITAL | Age: 87
End: 2022-07-14
Payer: MEDICARE

## 2022-07-14 ENCOUNTER — APPOINTMENT (EMERGENCY)
Dept: RADIOLOGY | Facility: HOSPITAL | Age: 87
End: 2022-07-14
Payer: MEDICARE

## 2022-07-14 ENCOUNTER — HOSPITAL ENCOUNTER (EMERGENCY)
Facility: HOSPITAL | Age: 87
Discharge: HOME | End: 2022-07-14
Attending: EMERGENCY MEDICINE
Payer: MEDICARE

## 2022-07-14 VITALS
TEMPERATURE: 98 F | OXYGEN SATURATION: 96 % | RESPIRATION RATE: 26 BRPM | DIASTOLIC BLOOD PRESSURE: 86 MMHG | HEART RATE: 74 BPM | SYSTOLIC BLOOD PRESSURE: 189 MMHG

## 2022-07-14 DIAGNOSIS — R06.02 SHORTNESS OF BREATH: Primary | ICD-10-CM

## 2022-07-14 LAB
ALBUMIN SERPL BCP-MCNC: 3.7 G/DL (ref 3.5–5)
ALP SERPL-CCNC: 84 U/L (ref 46–116)
ALT SERPL W P-5'-P-CCNC: 15 U/L (ref 12–78)
ANION GAP SERPL CALCULATED.3IONS-SCNC: 9 MMOL/L (ref 4–13)
AST SERPL W P-5'-P-CCNC: 22 U/L (ref 5–45)
BASOPHILS # BLD AUTO: 0.02 THOUSANDS/ΜL (ref 0–0.1)
BASOPHILS NFR BLD AUTO: 0 % (ref 0–1)
BILIRUB SERPL-MCNC: 0.33 MG/DL (ref 0.2–1)
BUN SERPL-MCNC: 18 MG/DL (ref 5–25)
CALCIUM SERPL-MCNC: 9.5 MG/DL (ref 8.3–10.1)
CARDIAC TROPONIN I PNL SERPL HS: 6 NG/L
CHLORIDE SERPL-SCNC: 105 MMOL/L (ref 100–108)
CO2 SERPL-SCNC: 26 MMOL/L (ref 21–32)
CREAT SERPL-MCNC: 1.3 MG/DL (ref 0.6–1.3)
EOSINOPHIL # BLD AUTO: 0.14 THOUSAND/ΜL (ref 0–0.61)
EOSINOPHIL NFR BLD AUTO: 2 % (ref 0–6)
ERYTHROCYTE [DISTWIDTH] IN BLOOD BY AUTOMATED COUNT: 14.6 % (ref 11.6–15.1)
GFR SERPL CREATININE-BSD FRML MDRD: 35 ML/MIN/1.73SQ M
GLUCOSE SERPL-MCNC: 103 MG/DL (ref 65–140)
HCT VFR BLD AUTO: 43.5 % (ref 34.8–46.1)
HGB BLD-MCNC: 14.3 G/DL (ref 11.5–15.4)
IMM GRANULOCYTES # BLD AUTO: 0.02 THOUSAND/UL (ref 0–0.2)
IMM GRANULOCYTES NFR BLD AUTO: 0 % (ref 0–2)
LYMPHOCYTES # BLD AUTO: 1.87 THOUSANDS/ΜL (ref 0.6–4.47)
LYMPHOCYTES NFR BLD AUTO: 25 % (ref 14–44)
MCH RBC QN AUTO: 30 PG (ref 26.8–34.3)
MCHC RBC AUTO-ENTMCNC: 32.9 G/DL (ref 31.4–37.4)
MCV RBC AUTO: 91 FL (ref 82–98)
MONOCYTES # BLD AUTO: 0.56 THOUSAND/ΜL (ref 0.17–1.22)
MONOCYTES NFR BLD AUTO: 8 % (ref 4–12)
NEUTROPHILS # BLD AUTO: 4.74 THOUSANDS/ΜL (ref 1.85–7.62)
NEUTS SEG NFR BLD AUTO: 65 % (ref 43–75)
NRBC BLD AUTO-RTO: 0 /100 WBCS
NT-PROBNP SERPL-MCNC: 341 PG/ML
PLATELET # BLD AUTO: 286 THOUSANDS/UL (ref 149–390)
PMV BLD AUTO: 9 FL (ref 8.9–12.7)
POTASSIUM SERPL-SCNC: 4.1 MMOL/L (ref 3.5–5.3)
PROT SERPL-MCNC: 7.4 G/DL (ref 6.4–8.2)
RBC # BLD AUTO: 4.76 MILLION/UL (ref 3.81–5.12)
SODIUM SERPL-SCNC: 140 MMOL/L (ref 136–145)
WBC # BLD AUTO: 7.35 THOUSAND/UL (ref 4.31–10.16)

## 2022-07-14 PROCEDURE — 83880 ASSAY OF NATRIURETIC PEPTIDE: CPT | Performed by: EMERGENCY MEDICINE

## 2022-07-14 PROCEDURE — 93005 ELECTROCARDIOGRAM TRACING: CPT

## 2022-07-14 PROCEDURE — 99284 EMERGENCY DEPT VISIT MOD MDM: CPT | Performed by: EMERGENCY MEDICINE

## 2022-07-14 PROCEDURE — 99285 EMERGENCY DEPT VISIT HI MDM: CPT

## 2022-07-14 PROCEDURE — 71275 CT ANGIOGRAPHY CHEST: CPT

## 2022-07-14 PROCEDURE — 71045 X-RAY EXAM CHEST 1 VIEW: CPT

## 2022-07-14 PROCEDURE — 84484 ASSAY OF TROPONIN QUANT: CPT | Performed by: EMERGENCY MEDICINE

## 2022-07-14 PROCEDURE — 85025 COMPLETE CBC W/AUTO DIFF WBC: CPT | Performed by: EMERGENCY MEDICINE

## 2022-07-14 PROCEDURE — 80053 COMPREHEN METABOLIC PANEL: CPT | Performed by: EMERGENCY MEDICINE

## 2022-07-14 PROCEDURE — 36415 COLL VENOUS BLD VENIPUNCTURE: CPT | Performed by: EMERGENCY MEDICINE

## 2022-07-14 PROCEDURE — 93971 EXTREMITY STUDY: CPT

## 2022-07-14 RX ORDER — FUROSEMIDE 10 MG/ML
20 INJECTION INTRAMUSCULAR; INTRAVENOUS ONCE
Status: DISCONTINUED | OUTPATIENT
Start: 2022-07-14 | End: 2022-07-15 | Stop reason: HOSPADM

## 2022-07-14 RX ADMIN — IOHEXOL 70 ML: 350 INJECTION, SOLUTION INTRAVENOUS at 18:45

## 2022-07-15 ENCOUNTER — TELEPHONE (OUTPATIENT)
Dept: INTERNAL MEDICINE CLINIC | Facility: CLINIC | Age: 87
End: 2022-07-15

## 2022-07-15 PROCEDURE — 93971 EXTREMITY STUDY: CPT | Performed by: SURGERY

## 2022-07-15 NOTE — TELEPHONE ENCOUNTER
Elidia Dunlap called to say pt did goto the [facility] e/r for clot worries ,  Was there for 8 hours :(     Everything checked out ok though for clot so that was good,  Although she states the e/r dr thinks her issues stem from water retention   They offered a lacix injection and they refused it    Instead had her take extra water pill meds      So debby is asking edis at this point, what should pts water pill regiment be?(as it all relates to her kidney health n such )    E/r notes are in chart

## 2022-07-15 NOTE — ED PROVIDER NOTES
History  Chief Complaint   Patient presents with    Shortness of Breath     SOB X multiple days, had blood work done via pcp, d-dimer elevated, edema in lower extremities as well      80-year-old female presenting emergency department for evaluation shortness of breath  Patient describes shortness of breath for the past several days, worse at night, no leg swelling, no chest pain, no fevers chills or cough  No abdominal pain nausea vomiting  Went to PCP, had blood work done which revealed elevated D-dimer, prompting her presentation to the emergency department today for further evaluation of this  No history of blood clots  Does have some left leg swelling  Prior to Admission Medications   Prescriptions Last Dose Informant Patient Reported? Taking? Acetaminophen (Tylenol) 325 MG CAPS   Yes No   Sig: Take by mouth 2 (two) times a day as needed   Multiple Vitamins-Minerals (PreserVision AREDS 2+Multi Vit) CAPS   Yes No   Sig: Take by mouth   TURMERIC-GINGER PO  Self Yes No   Sig: Take by mouth   amoxicillin (AMOXIL) 500 MG tablet   Yes No   Sig: Take 500 mg by mouth 2 (two) times a day Before dental procedures   fluticasone (FLONASE) 50 mcg/act nasal spray   No No   Si spray into each nostril daily   Patient not taking: Reported on 2022   furosemide (LASIX) 20 mg tablet   No No   Sig: Take 1 tablet (20 mg total) by mouth daily as needed (increased swelling)   potassium chloride (MICRO-K) 10 MEQ CR capsule   No No   Sig: Take 2 tabs po when you take Furosemide        Facility-Administered Medications: None       Past Medical History:   Diagnosis Date    Arthritis     approximately    Breast cancer ([facility] [address] )     Cancer ([facility] [address] )     approximately    HL (hearing loss)     Hypertension     on and off    Visual impairment 2018    macular degeneration       Past Surgical History:   Procedure Laterality Date    EAR SURGERY      EYE SURGERY      cataracts    HIP SURGERY      JOINT REPLACEMENT  ?    knee & hip    KNEE SURGERY         History reviewed  No pertinent family history  I have reviewed and agree with the history as documented  E-Cigarette/Vaping    E-Cigarette Use Never User      E-Cigarette/Vaping Substances    Nicotine No     THC No     CBD No     Flavoring No     Other No     Unknown No      Social History     Tobacco Use    Smoking status: Current Some Day Smoker     Packs/day: 0 25     Years: 20 00     Pack years: 5 00     Types: Cigarettes    Smokeless tobacco: Never Used   Vaping Use    Vaping Use: Never used   Substance Use Topics    Alcohol use: Yes     Alcohol/week: 4 0 standard drinks     Types: 4 Standard drinks or equivalent per week    Drug use: Never       Review of Systems   Constitutional: Negative for appetite change, chills, fatigue and fever  HENT: Negative for sneezing and sore throat  Eyes: Negative for visual disturbance  Respiratory: Positive for shortness of breath  Negative for cough, choking, chest tightness and wheezing  Cardiovascular: Positive for leg swelling  Negative for chest pain and palpitations  Gastrointestinal: Negative for abdominal pain, constipation, diarrhea, nausea and vomiting  Genitourinary: Negative for difficulty urinating and dysuria  Neurological: Negative for dizziness, weakness, light-headedness, numbness and headaches  All other systems reviewed and are negative  Physical Exam  Physical Exam  Vitals and nursing note reviewed  Constitutional:       General: She is not in acute distress  Appearance: She is well-developed  She is not diaphoretic  HENT:      Head: Normocephalic and atraumatic  Eyes:      Pupils: Pupils are equal, round, and reactive to light  Neck:      Vascular: No JVD  Trachea: No tracheal deviation  Cardiovascular:      Rate and Rhythm: Normal rate and regular rhythm  Heart sounds: Normal heart sounds  No murmur heard  No friction rub  No gallop  Pulmonary:      Effort: Pulmonary effort is normal  No respiratory distress  Breath sounds: Normal breath sounds  No wheezing or rales  Abdominal:      General: Bowel sounds are normal  There is no distension  Palpations: Abdomen is soft  Tenderness: There is no abdominal tenderness  There is no guarding or rebound  Skin:     General: Skin is warm and dry  Coloration: Skin is not pale  Comments: Left leg nonpitting edema noted  Neurological:      Mental Status: She is alert and oriented to person, place, and time  Cranial Nerves: No cranial nerve deficit  Motor: No abnormal muscle tone     Psychiatric:         Behavior: Behavior normal          Vital Signs  ED Triage Vitals   Temperature Pulse Respirations Blood Pressure SpO2   07/14/22 1618 07/14/22 1618 07/14/22 1618 07/14/22 1618 07/14/22 1618   99 2 °F (37 3 °C) 82 20 134/87 96 %      Temp Source Heart Rate Source Patient Position - Orthostatic VS BP Location FiO2 (%)   07/14/22 1618 07/14/22 1618 07/14/22 1618 07/14/22 1618 --   Tympanic Monitor Sitting Right arm       Pain Score       07/14/22 1759       No Pain           Vitals:    07/14/22 1759 07/14/22 1830 07/14/22 2000 07/14/22 2137   BP: (!) 177/75 (!) 171/72 (!) 189/86    Pulse: 64 65 79 74   Patient Position - Orthostatic VS: Sitting Sitting Sitting          Visual Acuity      ED Medications  Medications   iohexol (OMNIPAQUE) 350 MG/ML injection (MULTI-DOSE) 70 mL (70 mL Intravenous Given 7/14/22 1845)       Diagnostic Studies  Results Reviewed     Procedure Component Value Units Date/Time    HS Troponin 0hr (reflex protocol) [[id number]]  (Normal) Collected: 07/14/22 2029    Lab Status: Final result Specimen: Blood from Arm, Right Updated: 07/14/22 2100     hs TnI 0hr 6 ng/L     NT-BNP PRO [[id number]]  (Normal) Collected: 07/14/22 1759    Lab Status: Final result Specimen: Blood from Arm, Right Updated: 07/14/22 1828     NT-proBNP 341 pg/mL     Comprehensive metabolic panel [[id number]] Collected: 07/14/22 1759    Lab Status: Final result Specimen: Blood from Arm, Right Updated: 07/14/22 1821     Sodium 140 mmol/L      Potassium 4 1 mmol/L      Chloride 105 mmol/L      CO2 26 mmol/L      ANION GAP 9 mmol/L      BUN 18 mg/dL      Creatinine 1 30 mg/dL      Glucose 103 mg/dL      Calcium 9 5 mg/dL      AST 22 U/L      ALT 15 U/L      Alkaline Phosphatase 84 U/L      Total Protein 7 4 g/dL      Albumin 3 7 g/dL      Total Bilirubin 0 33 mg/dL      eGFR 35 ml/min/1 73sq m     Narrative:      National Kidney Disease Foundation guidelines for Chronic Kidney Disease (CKD):     Stage 1 with normal or high GFR (GFR > 90 mL/min/1 73 square meters)    Stage 2 Mild CKD (GFR = 60-89 mL/min/1 73 square meters)    Stage 3A Moderate CKD (GFR = 45-59 mL/min/1 73 square meters)    Stage 3B Moderate CKD (GFR = 30-44 mL/min/1 73 square meters)    Stage 4 Severe CKD (GFR = 15-29 mL/min/1 73 square meters)    Stage 5 End Stage CKD (GFR <15 mL/min/1 73 square meters)  Note: GFR calculation is accurate only with a steady state creatinine    CBC and differential [[id number]] Collected: 07/14/22 1759    Lab Status: Final result Specimen: Blood from Arm, Right Updated: 07/14/22 1804     WBC 7 35 Thousand/uL      RBC 4 76 Million/uL      Hemoglobin 14 3 g/dL      Hematocrit 43 5 %      MCV 91 fL      MCH 30 0 pg      MCHC 32 9 g/dL      RDW 14 6 %      MPV 9 0 fL      Platelets 326 Thousands/uL      nRBC 0 /100 WBCs      Neutrophils Relative 65 %      Immat GRANS % 0 %      Lymphocytes Relative 25 %      Monocytes Relative 8 %      Eosinophils Relative 2 %      Basophils Relative 0 %      Neutrophils Absolute 4 74 Thousands/µL      Immature Grans Absolute 0 02 Thousand/uL      Lymphocytes Absolute 1 87 Thousands/µL      Monocytes Absolute 0 56 Thousand/µL      Eosinophils Absolute 0 14 Thousand/µL      Basophils Absolute 0 02 Thousands/µL                  CTA ED chest PE Study   Final Result by Shahid Levy MD (07/14 2000)      No pulmonary embolism  Large hiatal hernia  Workstation performed: [id number]         XR chest 1 view portable   Final Result by Shahid Levy MD (07/14 2000)      No acute cardiopulmonary disease  Workstation performed: [id number]         VAS lower limb venous duplex study, unilateral/limited    (Results Pending)              Procedures  Procedures         ED Course                               SBIRT 20yo+    Flowsheet Row Most Recent Value   SBIRT (25 yo +)    In order to provide better care to our patients, we are screening all of our patients for alcohol and drug use  Would it be okay to ask you these screening questions? Yes Filed at: 07/14/2022 1806   Initial Alcohol Screen: US AUDIT-C     1  How often do you have a drink containing alcohol? 5 Filed at: 07/14/2022 1806   2  How many drinks containing alcohol do you have on a typical day you are drinking? 0 Filed at: 07/14/2022 1806   3b  FEMALE Any Age, or MALE 65+: How often do you have 4 or more drinks on one occassion? 0 Filed at: 07/14/2022 1806   Audit-C Score 5 Filed at: 07/14/2022 1806   STEFANIE: How many times in the past year have you    Used an illegal drug or used a prescription medication for non-medical reasons? Never Filed at: 07/14/2022 1806                    MDM  Number of Diagnoses or Management Options  Shortness of breath  Diagnosis management comments: 77-year-old female with shortness of breath, elevated D-dimer, left leg swelling, will check ultrasound, CTA, cardiac workup, reassess  Cup overall reassuring, patient seems to be mildly volume overload, recommended dose of IV Lasix here and follow-up with PCP, patient states that she would prefer not to take this as well make her go to the bathroom, I instructed her to take 2 doses of Lasix tomorrow and to call family doctor, and to return if symptoms worsen        Disposition  Final diagnoses:   Shortness of breath     Time reflects when diagnosis was documented in both MDM as applicable and the Disposition within this note     Time User Action Codes Description Comment    7/14/2022  9:04 PM Radha Cloud Add [R06 02] Shortness of breath       ED Disposition     ED Disposition   Discharge    Condition   Stable    Date/Time   Thu Jul 14, 2022  9:04 PM    Comment   Ayse Hopkins discharge to home/self care  Follow-up Information     Follow up With Specialties Details Why Contact Info    [address] Internal Medicine, Nurse Practitioner   [place]  [place] [id number]  [phone]            Discharge Medication List as of 7/14/2022  9:05 PM      CONTINUE these medications which have NOT CHANGED    Details   Acetaminophen (Tylenol) 325 MG CAPS Take by mouth 2 (two) times a day as needed, Historical Med      amoxicillin (AMOXIL) 500 MG tablet Take 500 mg by mouth 2 (two) times a day Before dental procedures, Historical Med      fluticasone (FLONASE) 50 mcg/act nasal spray 1 spray into each nostril daily, Starting Wed 7/21/2021, Normal      furosemide (LASIX) 20 mg tablet Take 1 tablet (20 mg total) by mouth daily as needed (increased swelling), Starting Mon 3/21/2022, Normal      Multiple Vitamins-Minerals (PreserVision AREDS 2+Multi Vit) CAPS Take by mouth, Historical Med      potassium chloride (MICRO-K) 10 MEQ CR capsule Take 2 tabs po when you take Furosemide , Normal      TURMERIC-GINGER PO Take by mouth, Historical Med             No discharge procedures on file      PDMP Review     None          ED Provider  Electronically Signed by           Angelica Ragsdale MD  07/15/22 4052

## 2022-07-18 ENCOUNTER — TELEPHONE (OUTPATIENT)
Dept: OTHER | Facility: OTHER | Age: 87
End: 2022-07-18

## 2022-07-18 DIAGNOSIS — R60.0 LOCALIZED EDEMA: ICD-10-CM

## 2022-07-18 RX ORDER — FUROSEMIDE 20 MG/1
TABLET ORAL
Qty: 45 TABLET | Refills: 1 | Status: SHIPPED | OUTPATIENT
Start: 2022-07-18

## 2022-07-18 NOTE — TELEPHONE ENCOUNTER
Patient care taker called and is asking if th office can give her a call to schedule an new patient appointment for the patient

## 2022-07-18 NOTE — TELEPHONE ENCOUNTER
Patient's daughter called back and said what do they do in the meantime while they are waiting to get into the cardiologist?  Should patient continue with the lasix still once a week or can you up the frequency  Please advise    Mac Figueroa

## 2022-07-19 NOTE — TELEPHONE ENCOUNTER
Spoke with patient daughter Kane Navarro and advised of Dieter Bashir message  Kane Navarro verbalized understanding

## 2022-07-20 LAB
ATRIAL RATE: 68 BPM
ATRIAL RATE: 74 BPM
P AXIS: 14 DEGREES
P AXIS: 20 DEGREES
PR INTERVAL: 306 MS
QRS AXIS: -51 DEGREES
QRS AXIS: -72 DEGREES
QRSD INTERVAL: 140 MS
QRSD INTERVAL: 144 MS
QT INTERVAL: 422 MS
QT INTERVAL: 452 MS
QTC INTERVAL: 428 MS
QTC INTERVAL: 468 MS
T WAVE AXIS: 12 DEGREES
T WAVE AXIS: 7 DEGREES
VENTRICULAR RATE: 54 BPM
VENTRICULAR RATE: 74 BPM

## 2022-07-20 PROCEDURE — 93010 ELECTROCARDIOGRAM REPORT: CPT | Performed by: INTERNAL MEDICINE

## 2022-07-27 ENCOUNTER — APPOINTMENT (OUTPATIENT)
Dept: LAB | Facility: CLINIC | Age: 87
End: 2022-07-27
Payer: MEDICARE

## 2022-07-27 DIAGNOSIS — R60.0 LOCALIZED EDEMA: ICD-10-CM

## 2022-07-27 LAB
ALBUMIN SERPL BCP-MCNC: 3 G/DL (ref 3.5–5)
ANION GAP SERPL CALCULATED.3IONS-SCNC: 5 MMOL/L (ref 4–13)
BUN SERPL-MCNC: 24 MG/DL (ref 5–25)
CALCIUM ALBUM COR SERPL-MCNC: 9.8 MG/DL (ref 8.3–10.1)
CALCIUM SERPL-MCNC: 9 MG/DL (ref 8.3–10.1)
CHLORIDE SERPL-SCNC: 112 MMOL/L (ref 96–108)
CO2 SERPL-SCNC: 23 MMOL/L (ref 21–32)
CREAT SERPL-MCNC: 1.25 MG/DL (ref 0.6–1.3)
GFR SERPL CREATININE-BSD FRML MDRD: 37 ML/MIN/1.73SQ M
GLUCOSE P FAST SERPL-MCNC: 113 MG/DL (ref 65–99)
PHOSPHATE SERPL-MCNC: 2.9 MG/DL (ref 2.3–4.1)
POTASSIUM SERPL-SCNC: 4.4 MMOL/L (ref 3.5–5.3)
SODIUM SERPL-SCNC: 140 MMOL/L (ref 135–147)

## 2022-07-27 PROCEDURE — 80069 RENAL FUNCTION PANEL: CPT

## 2022-07-27 PROCEDURE — 36415 COLL VENOUS BLD VENIPUNCTURE: CPT

## 2022-08-05 ENCOUNTER — TELEPHONE (OUTPATIENT)
Dept: INTERNAL MEDICINE CLINIC | Facility: CLINIC | Age: 87
End: 2022-08-05

## 2022-08-05 NOTE — TELEPHONE ENCOUNTER
Myna Sophia called about [place], and shes still congested with thick yellow mucus that she brings up  They have been using a otc cough med for 1 week - 10 days already ( didn't know name)  Wasn't sure how long she should be doing that or if its harmful        Please advise

## 2022-08-20 ENCOUNTER — APPOINTMENT (OUTPATIENT)
Dept: RADIOLOGY | Facility: HOSPITAL | Age: 87
End: 2022-08-20
Payer: MEDICARE

## 2022-08-20 ENCOUNTER — HOSPITAL ENCOUNTER (EMERGENCY)
Facility: HOSPITAL | Age: 87
Discharge: HOME | End: 2022-08-20
Attending: EMERGENCY MEDICINE
Payer: MEDICARE

## 2022-08-20 ENCOUNTER — APPOINTMENT (EMERGENCY)
Dept: CT IMAGING | Facility: HOSPITAL | Age: 87
End: 2022-08-20
Payer: MEDICARE

## 2022-08-20 VITALS
HEART RATE: 67 BPM | WEIGHT: 180 LBS | RESPIRATION RATE: 19 BRPM | BODY MASS INDEX: 33.13 KG/M2 | SYSTOLIC BLOOD PRESSURE: 183 MMHG | TEMPERATURE: 97.8 F | HEIGHT: 62 IN | DIASTOLIC BLOOD PRESSURE: 77 MMHG | OXYGEN SATURATION: 98 %
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
## 2022-08-20 DIAGNOSIS — M25.462 KNEE EFFUSION, LEFT: ICD-10-CM

## 2022-08-20 DIAGNOSIS — R06.00 DYSPNEA: ICD-10-CM

## 2022-08-20 DIAGNOSIS — M25.569 KNEE PAIN: ICD-10-CM

## 2022-08-20 DIAGNOSIS — I10 HYPERTENSION: ICD-10-CM

## 2022-08-20 LAB
2HR DELTA HS TROPONIN: -1 NG/L
ALBUMIN SERPL BCP-MCNC: 3.6 G/DL (ref 3.5–5)
ALP SERPL-CCNC: 81 U/L (ref 46–116)
ALT SERPL W P-5'-P-CCNC: 15 U/L (ref 12–78)
ANION GAP SERPL CALCULATED.3IONS-SCNC: 10 MMOL/L (ref 4–13)
APTT PPP: 28 SECONDS (ref 23–37)
AST SERPL W P-5'-P-CCNC: 20 U/L (ref 5–45)
BASOPHILS # BLD AUTO: 0.03 THOUSANDS/ΜL (ref 0–0.1)
BASOPHILS NFR BLD AUTO: 0 % (ref 0–1)
BILIRUB DIRECT SERPL-MCNC: 0.17 MG/DL (ref 0–0.2)
BILIRUB SERPL-MCNC: 0.57 MG/DL (ref 0.2–1)
BUN SERPL-MCNC: 19 MG/DL (ref 5–25)
CALCIUM SERPL-MCNC: 9.3 MG/DL (ref 8.3–10.1)
CARDIAC TROPONIN I PNL SERPL HS: 6 NG/L
CARDIAC TROPONIN I PNL SERPL HS: 7 NG/L
CHLORIDE SERPL-SCNC: 108 MMOL/L (ref 96–108)
CO2 SERPL-SCNC: 24 MMOL/L (ref 21–32)
CREAT SERPL-MCNC: 1.28 MG/DL (ref 0.6–1.3)
EOSINOPHIL # BLD AUTO: 0.14 THOUSAND/ΜL (ref 0–0.61)
EOSINOPHIL NFR BLD AUTO: 2 % (ref 0–6)
ERYTHROCYTE [DISTWIDTH] IN BLOOD BY AUTOMATED COUNT: 15.3 % (ref 11.6–15.1)
FLUAV RNA RESP QL NAA+PROBE: NEGATIVE
FLUBV RNA RESP QL NAA+PROBE: NEGATIVE
GFR SERPL CREATININE-BSD FRML MDRD: 36 ML/MIN/1.73SQ M
GLUCOSE SERPL-MCNC: 108 MG/DL (ref 65–140)
HCT VFR BLD AUTO: 43.5 % (ref 34.8–46.1)
HGB BLD-MCNC: 14.5 G/DL (ref 11.5–15.4)
IMM GRANULOCYTES # BLD AUTO: 0.03 THOUSAND/UL (ref 0–0.2)
IMM GRANULOCYTES NFR BLD AUTO: 0 % (ref 0–2)
INR PPP: 1.15 (ref 0.84–1.19)
LYMPHOCYTES # BLD AUTO: 1.79 THOUSANDS/ΜL (ref 0.6–4.47)
LYMPHOCYTES NFR BLD AUTO: 20 % (ref 14–44)
MCH RBC QN AUTO: 30.1 PG (ref 26.8–34.3)
MCHC RBC AUTO-ENTMCNC: 33.3 G/DL (ref 31.4–37.4)
MCV RBC AUTO: 90 FL (ref 82–98)
MONOCYTES # BLD AUTO: 0.66 THOUSAND/ΜL (ref 0.17–1.22)
MONOCYTES NFR BLD AUTO: 7 % (ref 4–12)
NEUTROPHILS # BLD AUTO: 6.34 THOUSANDS/ΜL (ref 1.85–7.62)
NEUTS SEG NFR BLD AUTO: 71 % (ref 43–75)
NRBC BLD AUTO-RTO: 0 /100 WBCS
NT-PROBNP SERPL-MCNC: 617 PG/ML
PLATELET # BLD AUTO: 247 THOUSANDS/UL (ref 149–390)
PMV BLD AUTO: 8.7 FL (ref 8.9–12.7)
POTASSIUM SERPL-SCNC: 4.3 MMOL/L (ref 3.5–5.3)
PROT SERPL-MCNC: 7.2 G/DL (ref 6.4–8.4)
PROTHROMBIN TIME: 14.5 SECONDS (ref 11.6–14.5)
RBC # BLD AUTO: 4.82 MILLION/UL (ref 3.81–5.12)
RSV RNA RESP QL NAA+PROBE: NEGATIVE
SARS-COV-2 RNA RESP QL NAA+PROBE: NEGATIVE
SODIUM SERPL-SCNC: 142 MMOL/L (ref 135–147)
WBC # BLD AUTO: 8.99 THOUSAND/UL (ref 4.31–10.16)

## 2022-08-20 PROCEDURE — 99285 EMERGENCY DEPT VISIT HI MDM: CPT | Performed by: EMERGENCY MEDICINE

## 2022-08-20 PROCEDURE — 83880 ASSAY OF NATRIURETIC PEPTIDE: CPT | Performed by: EMERGENCY MEDICINE

## 2022-08-20 PROCEDURE — 70450 CT HEAD/BRAIN W/O DYE: CPT

## 2022-08-20 PROCEDURE — 73564 X-RAY EXAM KNEE 4 OR MORE: CPT

## 2022-08-20 PROCEDURE — 73700 CT LOWER EXTREMITY W/O DYE: CPT

## 2022-08-20 PROCEDURE — 85610 PROTHROMBIN TIME: CPT | Performed by: EMERGENCY MEDICINE

## 2022-08-20 PROCEDURE — 36415 COLL VENOUS BLD VENIPUNCTURE: CPT | Performed by: EMERGENCY MEDICINE

## 2022-08-20 PROCEDURE — 93005 ELECTROCARDIOGRAM TRACING: CPT

## 2022-08-20 PROCEDURE — 85025 COMPLETE CBC W/AUTO DIFF WBC: CPT | Performed by: EMERGENCY MEDICINE

## 2022-08-20 PROCEDURE — 85730 THROMBOPLASTIN TIME PARTIAL: CPT | Performed by: EMERGENCY MEDICINE

## 2022-08-20 PROCEDURE — 0241U HB NFCT DS VIR RESP RNA 4 TRGT: CPT | Performed by: EMERGENCY MEDICINE

## 2022-08-20 PROCEDURE — 80048 BASIC METABOLIC PNL TOTAL CA: CPT | Performed by: EMERGENCY MEDICINE

## 2022-08-20 PROCEDURE — 99284 EMERGENCY DEPT VISIT MOD MDM: CPT

## 2022-08-20 PROCEDURE — 72125 CT NECK SPINE W/O DYE: CPT

## 2022-08-20 PROCEDURE — 84484 ASSAY OF TROPONIN QUANT: CPT | Performed by: EMERGENCY MEDICINE

## 2022-08-20 PROCEDURE — 71045 X-RAY EXAM CHEST 1 VIEW: CPT

## 2022-08-20 PROCEDURE — G1004 CDSM NDSC: HCPCS

## 2022-08-20 PROCEDURE — 80076 HEPATIC FUNCTION PANEL: CPT | Performed by: EMERGENCY MEDICINE

## 2022-08-20 RX ORDER — ENALAPRIL MALEATE 10 MG/1
10 TABLET ORAL DAILY
Qty: 30 TABLET | Refills: 0 | Status: SHIPPED | OUTPATIENT
Start: 2022-08-20 | End: 2022-08-29 | Stop reason: SDUPTHER

## 2022-08-20 NOTE — ED NOTES
Patient ambulated with walker under staff supervision  Patient gait slow but stable  Patient denies any left knee pain with knee immobilizer in place  Patient states "I feel good, I want to go home"  ED physician notified        Romayne Sine, RN  08/20/22 [id number]

## 2022-08-20 NOTE — DISCHARGE INSTRUCTIONS
See your cardiologist as recommended on Wednesday  Use the knee immobilizer and your walker  See an orthopedist for further evaluation of your knee pain

## 2022-08-20 NOTE — ED PROVIDER NOTES
History  Chief Complaint   Patient presents with    Fall     Pt states she was walking up a step when her knee gave out and she fell back hitting her head on a piece of furniture, -LOC, -BT     79 yo female who presents to ED for evaluation of mechanical fall from standing  States her L knee gave out causing her to fall and strike her head without LOC or headache  Not on thinners  No neuro complaints or neck pain  Does c/o recent dyspnea and b/l leg swelling but no cough or chest pain  Is supposed to take lasix but has not taken it for the last two days  Has scheduled f/u this coming week w cardiology for same  Prior to Admission Medications   Prescriptions Last Dose Informant Patient Reported? Taking? Acetaminophen (Tylenol) 325 MG CAPS   Yes No   Sig: Take by mouth 2 (two) times a day as needed   Multiple Vitamins-Minerals (PreserVision AREDS 2+Multi Vit) CAPS   Yes No   Sig: Take by mouth   TURMERIC-GINGER PO  Self Yes No   Sig: Take by mouth   amoxicillin (AMOXIL) 500 MG tablet   Yes No   Sig: Take 500 mg by mouth 2 (two) times a day Before dental procedures   fluticasone (FLONASE) 50 mcg/act nasal spray   No No   Si spray into each nostril daily   Patient not taking: Reported on 2022   furosemide (LASIX) 20 mg tablet   No No   Sig: Take one tab po every other day   potassium chloride (MICRO-K) 10 MEQ CR capsule   No No   Sig: Take 2 tabs po when you take Furosemide  Facility-Administered Medications: None       Past Medical History:   Diagnosis Date    Arthritis     approximately    [facility]     Cancer ([facility] )     approximately    HL (hearing loss) 2010    Hypertension     on and off    Visual impairment 2018    macular degeneration       Past Surgical History:   Procedure Laterality Date    EAR SURGERY      EYE SURGERY      cataracts    HIP SURGERY      JOINT REPLACEMENT  ?    knee & hip    KNEE SURGERY         History reviewed   No pertinent family history  I have reviewed and agree with the history as documented  E-Cigarette/Vaping    E-Cigarette Use Never User      E-Cigarette/Vaping Substances    Nicotine No     THC No     CBD No     Flavoring No     Other No     Unknown No      Social History     Tobacco Use    Smoking status: Current Some Day Smoker     Packs/day: 0 25     Years: 20 00     Pack years: 5 00     Types: Cigarettes    Smokeless tobacco: Never Used   Vaping Use    Vaping Use: Never used   Substance Use Topics    Alcohol use: Yes     Alcohol/week: 4 0 standard drinks     Types: 4 Standard drinks or equivalent per week    Drug use: Never       Review of Systems   Constitutional: Negative for chills and fever  Respiratory: Positive for shortness of breath  Negative for chest tightness  Cardiovascular: Positive for leg swelling  Musculoskeletal: Positive for arthralgias and gait problem  Neurological: Negative for dizziness, tremors, seizures, syncope, facial asymmetry, speech difficulty, weakness, light-headedness, numbness and headaches  All other systems reviewed and are negative  Physical Exam  Physical Exam  Vitals and nursing note reviewed  Constitutional:       General: She is not in acute distress  Appearance: Normal appearance  She is well-developed  She is not ill-appearing, toxic-appearing or diaphoretic  HENT:      Head: Normocephalic and atraumatic  Eyes:      Conjunctiva/sclera: Conjunctivae normal       Pupils: Pupils are equal, round, and reactive to light  Neck:      Vascular: No JVD  Cardiovascular:      Rate and Rhythm: Normal rate and regular rhythm  Pulses: Normal pulses  Heart sounds: Normal heart sounds  Pulmonary:      Effort: Pulmonary effort is normal  No respiratory distress  Breath sounds: Normal breath sounds  No stridor  No wheezing, rhonchi or rales  Chest:      Chest wall: No tenderness  Abdominal:      General: There is no distension  Palpations: Abdomen is soft  Tenderness: There is no abdominal tenderness  There is no guarding or rebound  Musculoskeletal:         General: No tenderness or deformity  Normal range of motion  Cervical back: Normal range of motion and neck supple  No rigidity or tenderness  Right lower leg: Edema present  Left lower leg: Edema present  Skin:     General: Skin is warm and dry  Capillary Refill: Capillary refill takes less than 2 seconds  Coloration: Skin is not jaundiced or pale  Findings: No bruising, erythema, lesion or rash  Neurological:      General: No focal deficit present  Mental Status: She is alert and oriented to person, place, and time  Cranial Nerves: No cranial nerve deficit  Sensory: No sensory deficit  Motor: No weakness or abnormal muscle tone        Coordination: Coordination normal          Vital Signs  ED Triage Vitals   Temperature Pulse Respirations Blood Pressure SpO2   08/20/22 1515 08/20/22 1502 08/20/22 1502 08/20/22 1502 08/20/22 1502   97 8 °F (36 6 °C) 75 17 (!) 188/80 97 %      Temp Source Heart Rate Source Patient Position - Orthostatic VS BP Location FiO2 (%)   08/20/22 1502 08/20/22 1502 08/20/22 1502 08/20/22 1502 --   Oral Monitor Sitting Right arm       Pain Score       --                  Vitals:    08/20/22 1510 08/20/22 1610 08/20/22 1710 08/20/22 1810   BP: (!) 188/80 (!) 175/81 (!) 188/72 (!) 183/77   Pulse: 69 62 67 67   Patient Position - Orthostatic VS: Sitting Sitting           Visual Acuity  Visual Acuity    Flowsheet Row Most Recent Value   L Pupil Size (mm) 3   R Pupil Size (mm) 3          ED Medications  Medications - No data to display    Diagnostic Studies  Results Reviewed     Procedure Component Value Units Date/Time    HS Troponin I 2hr [[id number]]  (Normal) Collected: 08/20/22 1740    Lab Status: Final result Specimen: Blood from Arm, Right Updated: 08/20/22 1808     hs TnI 2hr 6 ng/L      Delta 2hr hsTnI -1 ng/L     FLU/RSV/COVID - if FLU/RSV clinically relevant [[id number]]  (Normal) Collected: 08/20/22 1516    Lab Status: Final result Specimen: Nares from Nose Updated: 08/20/22 1631     SARS-CoV-2 Negative     INFLUENZA A PCR Negative     INFLUENZA B PCR Negative     RSV PCR Negative    Narrative:      FOR PEDIATRIC PATIENTS - copy/paste COVID Guidelines URL to browser: [email or web address]  [employer]    SARS-CoV-2 assay is a Nucleic Acid Amplification assay intended for the  qualitative detection of nucleic acid from SARS-CoV-2 in nasopharyngeal  swabs  Results are for the presumptive identification of SARS-CoV-2 RNA  Positive results are indicative of infection with SARS-CoV-2, the virus  causing COVID-19, but do not rule out bacterial infection or co-infection  with other viruses  Laboratories within the [place] and its  territories are required to report all positive results to the appropriate  public health authorities  Negative results do not preclude SARS-CoV-2  infection and should not be used as the sole basis for treatment or other  patient management decisions  Negative results must be combined with  clinical observations, patient history, and epidemiological information  This test has not been FDA cleared or approved  This test has been authorized by FDA under an Emergency Use Authorization  (EUA)  This test is only authorized for the duration of time the  declaration that circumstances exist justifying the authorization of the  emergency use of an in vitro diagnostic tests for detection of SARS-CoV-2  virus and/or diagnosis of COVID-19 infection under section 564(b)(1) of  the Act, 21 U  S C  375QDK-5(I)(6), unless the authorization is terminated  or revoked sooner  The test has been validated but independent review by FDA  and CLIA is pending  Test performed using [employer]:  This RT-PCR assay targets N2,  a region unique to SARS-CoV-2  A conserved region in the E-gene was chosen  for pan-Sarbecovirus detection which includes SARS-CoV-2      Hepatic function panel [[id number]]  (Normal) Collected: 08/20/22 1516    Lab Status: Final result Specimen: Blood from Arm, Right Updated: 08/20/22 1550     Total Bilirubin 0 57 mg/dL      Bilirubin, Direct 0 17 mg/dL      Alkaline Phosphatase 81 U/L      AST 20 U/L      ALT 15 U/L      Total Protein 7 2 g/dL      Albumin 3 6 g/dL     NT-BNP PRO [[id number]]  (Abnormal) Collected: 08/20/22 1516    Lab Status: Final result Specimen: Blood from Arm, Right Updated: 08/20/22 1550     NT-proBNP 617 pg/mL     HS Troponin 0hr (reflex protocol) [[id number]]  (Normal) Collected: 08/20/22 1516    Lab Status: Final result Specimen: Blood from Arm, Right Updated: 08/20/22 1549     hs TnI 0hr 7 ng/L     Basic metabolic panel [[id number]] Collected: 08/20/22 1516    Lab Status: Final result Specimen: Blood from Arm, Right Updated: 08/20/22 1542     Sodium 142 mmol/L      Potassium 4 3 mmol/L      Chloride 108 mmol/L      CO2 24 mmol/L      ANION GAP 10 mmol/L      BUN 19 mg/dL      Creatinine 1 28 mg/dL      Glucose 108 mg/dL      Calcium 9 3 mg/dL      eGFR 36 ml/min/1 73sq m     Narrative:      [place] guidelines for Chronic Kidney Disease (CKD):     Stage 1 with normal or high GFR (GFR > 90 mL/min/1 73 square meters)    Stage 2 Mild CKD (GFR = 60-89 mL/min/1 73 square meters)    Stage 3A Moderate CKD (GFR = 45-59 mL/min/1 73 square meters)    Stage 3B Moderate CKD (GFR = 30-44 mL/min/1 73 square meters)    Stage 4 Severe CKD (GFR = 15-29 mL/min/1 73 square meters)    Stage 5 End Stage CKD (GFR <15 mL/min/1 73 square meters)  Note: GFR calculation is accurate only with a steady state creatinine    Protime-INR [[id number]]  (Normal) Collected: 08/20/22 1516    Lab Status: Final result Specimen: Blood from Arm, Right Updated: 08/20/22 1539     Protime 14 5 seconds      INR 1 15    APTT [[id number]]  (Normal) Collected: 08/20/22 1516    Lab Status: Final result Specimen: Blood from Arm, Right Updated: 08/20/22 1539     PTT 28 seconds     CBC and differential [[id number]]  (Abnormal) Collected: 08/20/22 1516    Lab Status: Final result Specimen: Blood from Arm, Right Updated: 08/20/22 1521     WBC 8 99 Thousand/uL      RBC 4 82 Million/uL      Hemoglobin 14 5 g/dL      Hematocrit 43 5 %      MCV 90 fL      MCH 30 1 pg      MCHC 33 3 g/dL      RDW 15 3 %      MPV 8 7 fL      Platelets 609 Thousands/uL      nRBC 0 /100 WBCs      Neutrophils Relative 71 %      Immat GRANS % 0 %      Lymphocytes Relative 20 %      Monocytes Relative 7 %      Eosinophils Relative 2 %      Basophils Relative 0 %      Neutrophils Absolute 6 34 Thousands/µL      Immature Grans Absolute 0 03 Thousand/uL      Lymphocytes Absolute 1 79 Thousands/µL      Monocytes Absolute 0 66 Thousand/µL      Eosinophils Absolute 0 14 Thousand/µL      Basophils Absolute 0 03 Thousands/µL                  CT lower extremity wo contrast left   Final Result by Sandie Herbert MD (08/20 1810)      No acute osseous abnormality  Small joint effusion noted  Advanced osteoarthritis  Workstation performed: [id number]         CT head without contrast   Final Result by Sandie Hebrert MD (08/20 1638)      No acute intracranial abnormality  Workstation performed: [id number]         CT spine cervical without contrast   Final Result by Sandie Herbert MD (08/20 1645)      No cervical spine fracture or traumatic malalignment  Workstation performed: [id number]         XR knee 4+ vw left injury   Final Result by Sandie Herbert MD (08/20 1810)      No acute osseous abnormality  Degenerative changes as described  Workstation performed: [id number]         XR chest portable   Final Result by Sandie Herbert MD (08/20 1646)      No acute cardiopulmonary disease                    Workstation performed: [id number]                    Procedures  ECG 12 Lead Documentation Only    Date/Time: 8/20/2022 3:19 PM  Performed by: Shira Horn MD  Authorized by: Shira Horn MD     Indications / Diagnosis:  Dyspnea  ECG reviewed by me, the ED Provider: yes    Patient location:  ED  Previous ECG:     Previous ECG:  Compared to current    Similarity:  No change  Interpretation:     Interpretation: non-specific    Rate:     ECG rate:  68    ECG rate assessment: normal    Rhythm:     Rhythm: sinus rhythm    Comments:      LAFB  First degree AV block  No acute ischemic changes  Unchanged as compared to ekg from 14 July 2022  ED Course                                             MDM    Disposition  Final diagnoses:   Fall, initial encounter   Knee pain   Knee effusion, left   Dyspnea   Hypertension     Time reflects when diagnosis was documented in both MDM as applicable and the Disposition within this note     Time User Action Codes Description Comment    8/20/2022  6:35 PM Ailyn [place] Add [[id number]  [id number]] Fall, initial encounter     8/20/2022  6:35 PM Ailyn [place] Add [M25 [id number]] Knee pain     8/20/2022  6:35 PM Ailyn [place] Add [M25 [id number]] Knee effusion, left     8/20/2022  6:35 PM Ailyn [place] Add [R06 [id number]] Dyspnea     8/20/2022  6:37 PM Ailyn [place] Add [I10] Hypertension       ED Disposition     ED Disposition   Discharge    Condition   Stable    Date/Time   Sat Aug 20, 2022  6:35 PM    Comment   Jagr[place] November discharge to home/self care                 Follow-up Information     Follow up With Specialties Details Why Contact Info    Carlos Khalil MD Orthopedic Surgery In 1 week  [address] [address]  [phone]            Discharge Medication List as of 8/20/2022  6:37 PM      CONTINUE these medications which have NOT CHANGED    Details   Acetaminophen (Tylenol) 325 MG CAPS Take by mouth 2 (two) times a day as needed, Historical Med      amoxicillin (AMOXIL) 500 MG tablet Take 500 mg by mouth 2 (two) times a day Before dental procedures, Historical Med      fluticasone (FLONASE) 50 mcg/act nasal spray 1 spray into each nostril daily, Starting Wed 7/21/2021, Normal      furosemide (LASIX) 20 mg tablet Take one tab po every other day, Normal      Multiple Vitamins-Minerals (PreserVision AREDS 2+Multi Vit) CAPS Take by mouth, Historical Med      potassium chloride (MICRO-K) 10 MEQ CR capsule Take 2 tabs po when you take Furosemide , Normal      TURMERIC-MIKE PO Take by mouth, Historical Med             No discharge procedures on file      PDMP Review     None          ED Provider  Electronically Signed by           Amado Kuo MD  08/23/22 8291

## 2022-08-21 LAB
ATRIAL RATE: 68 BPM
P AXIS: 31 DEGREES
PR INTERVAL: 302 MS
QRS AXIS: -56 DEGREES
QRSD INTERVAL: 130 MS
QT INTERVAL: 438 MS
QTC INTERVAL: 465 MS
T WAVE AXIS: 4 DEGREES
VENTRICULAR RATE: 68 BPM

## 2022-08-21 PROCEDURE — 93010 ELECTROCARDIOGRAM REPORT: CPT | Performed by: INTERNAL MEDICINE

## 2022-08-24 ENCOUNTER — CONSULT (OUTPATIENT)
Dept: CARDIOLOGY CLINIC | Facility: CLINIC | Age: 87
End: 2022-08-24
Payer: MEDICARE

## 2022-08-24 VITALS
HEIGHT: 62 IN | HEART RATE: 81 BPM | BODY MASS INDEX: 33.49 KG/M2 | DIASTOLIC BLOOD PRESSURE: 62 MMHG | WEIGHT: 182 LBS | SYSTOLIC BLOOD PRESSURE: 118 MMHG | OXYGEN SATURATION: 96 % | RESPIRATION RATE: 16 BRPM

## 2022-08-24 DIAGNOSIS — R00.2 PALPITATIONS: ICD-10-CM

## 2022-08-24 DIAGNOSIS — R94.31 ABNORMAL EKG: ICD-10-CM

## 2022-08-24 DIAGNOSIS — R60.0 LOCALIZED EDEMA: ICD-10-CM

## 2022-08-24 DIAGNOSIS — R06.02 SHORTNESS OF BREATH: ICD-10-CM

## 2022-08-24 DIAGNOSIS — I45.2 BIFASCICULAR BLOCK: Primary | ICD-10-CM

## 2022-08-24 PROCEDURE — 99204 OFFICE O/P NEW MOD 45 MIN: CPT | Performed by: INTERNAL MEDICINE

## 2022-08-24 NOTE — PROGRESS NOTES
Cardiology Consultation     Jonn Rojas  [id number]  7/1/1929  [facility] [address]ario Hilario[address] [place] [address] Ronit  [facility] [id number]    HPI:  Very pleasant 51-year-old  (for 1 year) who recently was in [facility]) to distribute the ashes of her   While there, she had a fall and injured her left leg  ED evaluation showed no critical issues  She has a history of a positive stress test in the past although she denies chest discomfort  While in the ED she was noted to have a left anterior hemiblock and incomplete right bundle branch block  She now lives in a [facility] independently  She walks with a walker  She has had a left hip replacement and a right knee replacement  She has had chronic swelling of the left leg and DVT has been ruled out  She uses intermittent compression of both calves twice daily  She denies chest pain and syncope  She does get short of breath with exertion but recent chest x-ray was normal     1  Localized edema  -     Ambulatory Referral to Cardiology    2  Palpitations  -     Ambulatory Referral to Cardiology    3  Shortness of breath  -     Ambulatory Referral to Cardiology    4  Abnormal EKG  -     Ambulatory Referral to Cardiology      Patient Active Problem List   Diagnosis    Localized edema    Wound of skin    Anasarca    Elevated blood pressure reading    Rhinorrhea    Medicare annual wellness visit, subsequent    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    Osteoarthritis of toe    Shortness of breath    Palpitations    Abnormal EKG     Past Medical History:   Diagnosis Date    Arthritis 2000    approximately    Breast cancer ([address]     Cancer ([address] ) 2010    approximately    HL (hearing loss) 2010    Hypertension     on and off    Visual impairment 2018    macular degeneration     Social History     Socioeconomic History    Marital status:   Spouse name: Not on file    Number of children: Not on file    Years of education: Not on file    Highest education level: Not on file   Occupational History    Not on file   Tobacco Use    Smoking status: Current Some Day Smoker     Packs/day: 0 25     Years: 20 00     Pack years: 5 00     Types: Cigarettes    Smokeless tobacco: Never Used   Vaping Use    Vaping Use: Never used   Substance and Sexual Activity    Alcohol use: Yes     Alcohol/week: 4 0 standard drinks     Types: 4 Standard drinks or equivalent per week    Drug use: Never    Sexual activity: Not Currently     Birth control/protection: Post-menopausal   Other Topics Concern    Not on file   Social History Narrative    Not on file     Social Determinants of Health     Financial Resource Strain: Not on file   Food Insecurity: Not on file   Transportation Needs: Not on file   Physical Activity: Not on file   Stress: Not on file   Social Connections: Not on file   Intimate Partner Violence: Not on file   Housing Stability: Not on file      History reviewed  No pertinent family history  Past Surgical History:   Procedure Laterality Date    EAR SURGERY      EYE SURGERY      cataracts    HIP SURGERY      JOINT REPLACEMENT  ?    knee & hip    KNEE SURGERY         Current Outpatient Medications:     Acetaminophen (Tylenol) 325 MG CAPS, Take by mouth 2 (two) times a day as needed, Disp: , Rfl:     enalapril (VASOTEC) 10 mg tablet, Take 1 tablet (10 mg total) by mouth daily, Disp: 30 tablet, Rfl: 0    furosemide (LASIX) 20 mg tablet, Take one tab po every other day, Disp: 45 tablet, Rfl: 1    Multiple Vitamins-Minerals (PreserVision AREDS 2+Multi Vit) CAPS, Take by mouth, Disp: , Rfl:     potassium chloride (MICRO-K) 10 MEQ CR capsule, Take 2 tabs po when you take Furosemide  , Disp: 180 capsule, Rfl: 0    TURMERIC-GINGER PO, Take by mouth, Disp: , Rfl:     amoxicillin (AMOXIL) 500 MG tablet, Take 500 mg by mouth 2 (two) times a day Before dental procedures (Patient not taking: No sig reported), Disp: , Rfl:     fluticasone (FLONASE) 50 mcg/act nasal spray, 1 spray into each nostril daily (Patient not taking: No sig reported), Disp: 9 9 mL, Rfl: 5  No Known Allergies  Vitals:    08/24/22 0907   BP: 118/62   BP Location: Left arm   Patient Position: Sitting   Cuff Size: Standard   Pulse: 81   Resp: 16   SpO2: 96%   Weight: 82 6 kg (182 lb)   Height: 5' 2" (1 575 m)       Labs:  Admission on 08/20/2022, Discharged on 08/20/2022   Component Date Value    WBC 08/20/2022 8 99     RBC 08/20/2022 4 82     Hemoglobin 08/20/2022 14 5     Hematocrit 08/20/2022 43 5     MCV 08/20/2022 90     MCH 08/20/2022 30 1     MCHC 08/20/2022 33 3     RDW 08/20/2022 15 3 (A)    MPV 08/20/2022 8 7 (A)    Platelets 73/35/1036 247     nRBC 08/20/2022 0     Neutrophils Relative 08/20/2022 71     Immat GRANS % 08/20/2022 0     Lymphocytes Relative 08/20/2022 20     Monocytes Relative 08/20/2022 7     Eosinophils Relative 08/20/2022 2     Basophils Relative 08/20/2022 0     Neutrophils Absolute 08/20/2022 6 34     Immature Grans Absolute 08/20/2022 0 03     Lymphocytes Absolute 08/20/2022 1 79     Monocytes Absolute 08/20/2022 0 66     Eosinophils Absolute 08/20/2022 0 14     Basophils Absolute 08/20/2022 0 03     Sodium 08/20/2022 142     Potassium 08/20/2022 4 3     Chloride 08/20/2022 108     CO2 08/20/2022 24     ANION GAP 08/20/2022 10     BUN 08/20/2022 19     Creatinine 08/20/2022 1 28     Glucose 08/20/2022 108     Calcium 08/20/2022 9 3     eGFR 08/20/2022 36     Total Bilirubin 08/20/2022 0 57     Bilirubin, Direct 08/20/2022 0 17     Alkaline Phosphatase 08/20/2022 81     AST 08/20/2022 20     ALT 08/20/2022 15     Total Protein 08/20/2022 7 2     Albumin 08/20/2022 3 6     hs TnI 0hr 08/20/2022 7     Protime 08/20/2022 14 5     INR 08/20/2022 1 15     PTT 08/20/2022 28     NT-proBNP 08/20/2022 617 (A)    SARS-CoV-2 08/20/2022 Negative     INFLUENZA A PCR 08/20/2022 Negative     INFLUENZA B PCR 08/20/2022 Negative     RSV PCR 08/20/2022 Negative     Ventricular Rate 08/20/2022 68     Atrial Rate 08/20/2022 68     NM Interval 08/20/2022 302     QRSD Interval 08/20/2022 130     QT Interval 08/20/2022 438     QTC Interval 08/20/2022 465     P Axis 08/20/2022 31     QRS Axis 08/20/2022 -56     T Wave Axis 08/20/2022 4     hs TnI 2hr 08/20/2022 6     Delta 2hr hsTnI 08/20/2022 -1    Appointment on 07/27/2022   Component Date Value    Albumin 07/27/2022 3 0 (A)    Calcium 07/27/2022 9 0     Corrected Calcium 07/27/2022 9 8     Phosphorus 07/27/2022 2 9     BUN 07/27/2022 24     Creatinine 07/27/2022 1 25     Sodium 07/27/2022 140     Potassium 07/27/2022 4 4     Chloride 07/27/2022 112 (A)    CO2 07/27/2022 23     ANION GAP 07/27/2022 5     eGFR 07/27/2022 37     Glucose, Fasting 07/27/2022 113 (A)   Admission on 07/14/2022, Discharged on 07/14/2022   Component Date Value    WBC 07/14/2022 7 35     RBC 07/14/2022 4 76     Hemoglobin 07/14/2022 14 3     Hematocrit 07/14/2022 43 5     MCV 07/14/2022 91     MCH 07/14/2022 30 0     MCHC 07/14/2022 32 9     RDW 07/14/2022 14 6     MPV 07/14/2022 9 0     Platelets 35/58/5849 286     nRBC 07/14/2022 0     Neutrophils Relative 07/14/2022 65     Immat GRANS % 07/14/2022 0     Lymphocytes Relative 07/14/2022 25     Monocytes Relative 07/14/2022 8     Eosinophils Relative 07/14/2022 2     Basophils Relative 07/14/2022 0     Neutrophils Absolute 07/14/2022 4 74     Immature Grans Absolute 07/14/2022 0 02     Lymphocytes Absolute 07/14/2022 1 87     Monocytes Absolute 07/14/2022 0 56     Eosinophils Absolute 07/14/2022 0 14     Basophils Absolute 07/14/2022 0 02     Sodium 07/14/2022 140     Potassium 07/14/2022 4 1     Chloride 07/14/2022 105     CO2 07/14/2022 26     ANION GAP 07/14/2022 9     BUN 07/14/2022 18     Creatinine 07/14/2022 1 30     Glucose 07/14/2022 103     Calcium 07/14/2022 9 5     AST 07/14/2022 22     ALT 07/14/2022 15     Alkaline Phosphatase 07/14/2022 84     Total Protein 07/14/2022 7 4     Albumin 07/14/2022 3 7     Total Bilirubin 07/14/2022 0 33     eGFR 07/14/2022 35     NT-proBNP 07/14/2022 341     hs TnI 0hr 07/14/2022 6     Ventricular Rate 07/14/2022 54     Atrial Rate 07/14/2022 68     QRSD Interval 07/14/2022 144     QT Interval 07/14/2022 452     QTC Interval 07/14/2022 428     P Axis 07/14/2022 20     QRS Axis 07/14/2022 -51     T Wave Axis 07/14/2022 12     Ventricular Rate 07/14/2022 74     Atrial Rate 07/14/2022 74     MT Interval 07/14/2022 306     QRSD Interval 07/14/2022 140     QT Interval 07/14/2022 422     QTC Interval 07/14/2022 468     P Axis 07/14/2022 14     QRS Axis 07/14/2022 -72     T Wave [place] 07/14/2022 7    Appointment on 07/13/2022   Component Date Value    WBC 07/13/2022 6 27     RBC 07/13/2022 4 72     Hemoglobin 07/13/2022 14 2     Hematocrit 07/13/2022 43 9     MCV 07/13/2022 93     MCH 07/13/2022 30 1     MCHC 07/13/2022 32 3     RDW 07/13/2022 14 6     MPV 07/13/2022 9 6     Platelets 39/71/4065 304     nRBC 07/13/2022 0     Neutrophils Relative 07/13/2022 66     Immat GRANS % 07/13/2022 0     Lymphocytes Relative 07/13/2022 23     Monocytes Relative 07/13/2022 7     Eosinophils Relative 07/13/2022 3     Basophils Relative 07/13/2022 1     Neutrophils Absolute 07/13/2022 4 17     Immature Grans Absolute 07/13/2022 0 02     Lymphocytes Absolute 07/13/2022 1 44     Monocytes Absolute 07/13/2022 0 44     Eosinophils Absolute 07/13/2022 0 17     Basophils Absolute 07/13/2022 0 03     Sodium 07/13/2022 140     Potassium 07/13/2022 4 4     Chloride 07/13/2022 111 (A)    CO2 07/13/2022 23     ANION GAP 07/13/2022 6     BUN 07/13/2022 18     Creatinine 07/13/2022 1 28     Glucose, Fasting 07/13/2022 99     Calcium 07/13/2022 9 3     AST 07/13/2022 23     ALT 07/13/2022 14     Alkaline Phosphatase 07/13/2022 74     Total Protein 07/13/2022 6 9     Albumin 07/13/2022 3 5     Total Bilirubin 07/13/2022 0 60     eGFR 07/13/2022 36     Cholesterol 07/13/2022 182     Triglycerides 07/13/2022 180 (A)    HDL, Direct 07/13/2022 46 (A)    LDL Calculated 07/13/2022 100     Non-HDL-Chol (CHOL-HDL) 07/13/2022 136     TSH 3RD GENERATON 07/13/2022 3 680     D-Dimer, Quant 07/13/2022 1 36 (A)     Imaging: XR chest portable    Result Date: 8/20/2022  Narrative: CHEST INDICATION:   SOB  COMPARISON:  7/14/2022  EXAM PERFORMED/VIEWS:  XR CHEST PORTABLE FINDINGS: Normal heart size  Hiatal hernia again seen  The lungs are clear  No pneumothorax or pleural effusion  Osseous structures appear within normal limits for patient age  Impression: No acute cardiopulmonary disease  Workstation performed: [id number]     XR knee 4+ vw left injury    Result Date: 8/20/2022  Narrative: LEFT KNEE INDICATION:   knee pain s/p fall  COMPARISON:  None VIEWS:  XR KNEE 4+ VW LEFT INJURY FINDINGS: There is no acute fracture or dislocation  There is no joint effusion  Severe tricompartmental osteoarthritis as evidenced by joint space narrowing, osteophyte formation and subchondral sclerosis  No lytic or blastic osseous lesion  Soft tissues are unremarkable  Impression: No acute osseous abnormality  Degenerative changes as described  Workstation performed: [id number]     CT head without contrast    Result Date: 8/20/2022  Narrative: CT BRAIN - WITHOUT CONTRAST INDICATION:   trauma  COMPARISON:  None  TECHNIQUE:  CT examination of the brain was performed  In addition to axial images, sagittal and coronal 2D reformatted images were created and submitted for interpretation  Radiation dose length product (DLP) for this visit:  870 mGy-cm     This examination, like all CT scans performed in the [facility], was performed utilizing techniques to minimize radiation dose exposure, including the use of iterative reconstruction and automated exposure control  IMAGE QUALITY:  Diagnostic  FINDINGS: PARENCHYMA: Decreased attenuation is noted in periventricular and subcortical white matter demonstrating an appearance that is statistically most likely to represent moderate microangiopathic change  No CT signs of acute infarction  No intracranial mass, mass effect or midline shift  No acute parenchymal hemorrhage  VENTRICLES AND EXTRA-AXIAL SPACES:  Normal for the patient's age  VISUALIZED ORBITS AND PARANASAL SINUSES:  Unremarkable  CALVARIUM AND EXTRACRANIAL SOFT TISSUES:  Normal      Impression: No acute intracranial abnormality  Workstation performed: [id number]     CT spine cervical without contrast    Result Date: 8/20/2022  Narrative: CT CERVICAL SPINE - WITHOUT CONTRAST INDICATION:   trauma  COMPARISON:  None  TECHNIQUE:  CT examination of the cervical spine was performed without intravenous contrast   Contiguous axial images were obtained  Sagittal and coronal reconstructions were performed  Radiation dose length product (DLP) for this visit:  371 mGy-cm   This examination, like all CT scans performed in the [facility], was performed utilizing techniques to minimize radiation dose exposure, including the use of iterative reconstruction and automated exposure control  IMAGE QUALITY:  Diagnostic  FINDINGS: ALIGNMENT:  Normal alignment of the cervical spine  No subluxation  VERTEBRAL BODIES:  No fracture  DEGENERATIVE CHANGES:  Moderate to severe multilevel cervical degenerative changes are noted  No critical central canal stenosis  PREVERTEBRAL AND PARASPINAL SOFT TISSUES:  Atherosclerotic vascular calcification is noted at the carotid bifurcations bilaterally  THORACIC INLET:  Normal      Impression: No cervical spine fracture or traumatic malalignment    Workstation performed: [id number]     CT lower extremity wo contrast left    Result Date: 8/20/2022  Narrative: CT left knee without IV contrast INDICATION: Knee trauma, tibial plateau fracture (Age >= 5y) L knee pain, ? lateral tibial plateau fx  COMPARISON: None  TECHNIQUE: CT examination of the above was performed  This examination, like all CT scans performed in the [facility], was performed utilizing techniques to minimize radiation dose exposure, including the use of iterative reconstruction and automated exposure control software  Sagittal and coronal two dimensional reconstructed images were also submitted for interpretation  Rad dose  274 mGy-cm FINDINGS: OSSEOUS STRUCTURES:  No fracture, dislocation or destructive osseous lesion  Severe degenerative changes in the medial lateral compartments, and mild degenerative changes in the patellofemoral compartment  Diffuse osteopenia  VISUALIZED MUSCULATURE:  Unremarkable  SOFT TISSUES:  Small simple joint effusion  OTHER PERTINENT FINDINGS:  None  Impression: No acute osseous abnormality  Small joint effusion noted  Advanced osteoarthritis  Workstation performed: [id number]       Review of Systems:  Review of Systems    Physical Exam:  118/62  Heart rate 81 and regular  Skin reveals presence of ecchymoses on the left leg and right arm were blood was taken  Pupils equal   Carotids 2+ without bruits  Lungs clear  Regular rhythm with no murmurs  No neck vein distention  No organomegaly  2+ edema left leg 1+ right leg with signs of chronic venous insufficiency    Discussion/Summary:    1  Bifascicular block without symptoms  2  Hypertension controlled  3  Asymptomatic coronary artery disease  4  Chronic venous insufficiency  5  Shortness of breath without evidence of heart failure  This has been chronic and is stable    Recommendations:    1  No further testing at this time-she appears stable  2  Continue current medication  3  Return p r n               Malcolm Rainey MD

## 2022-08-25 ENCOUNTER — OFFICE VISIT (OUTPATIENT)
Dept: OBGYN CLINIC | Facility: CLINIC | Age: 87
End: 2022-08-25
Payer: MEDICARE

## 2022-08-25 ENCOUNTER — APPOINTMENT (OUTPATIENT)
Dept: RADIOLOGY | Facility: CLINIC | Age: 87
End: 2022-08-25
Payer: MEDICARE

## 2022-08-25 VITALS — BODY MASS INDEX: 33.49 KG/M2 | HEIGHT: 62 IN | WEIGHT: 182 LBS

## 2022-08-25 DIAGNOSIS — M25.562 LEFT KNEE PAIN, UNSPECIFIED CHRONICITY: Primary | ICD-10-CM

## 2022-08-25 DIAGNOSIS — M17.12 PRIMARY OSTEOARTHRITIS OF LEFT KNEE: ICD-10-CM

## 2022-08-25 DIAGNOSIS — M25.562 LEFT KNEE PAIN, UNSPECIFIED CHRONICITY: ICD-10-CM

## 2022-08-25 PROCEDURE — 99204 OFFICE O/P NEW MOD 45 MIN: CPT | Performed by: ORTHOPAEDIC SURGERY

## 2022-08-25 PROCEDURE — 73564 X-RAY EXAM KNEE 4 OR MORE: CPT

## 2022-08-25 PROCEDURE — 73560 X-RAY EXAM OF KNEE 1 OR 2: CPT

## 2022-08-25 PROCEDURE — 20610 DRAIN/INJ JOINT/BURSA W/O US: CPT | Performed by: ORTHOPAEDIC SURGERY

## 2022-08-25 RX ORDER — METHYLPREDNISOLONE ACETATE 40 MG/ML
2 INJECTION, SUSPENSION INTRA-ARTICULAR; INTRALESIONAL; INTRAMUSCULAR; SOFT TISSUE
Status: COMPLETED | OUTPATIENT
Start: 2022-08-25 | End: 2022-08-25

## 2022-08-25 RX ORDER — BUPIVACAINE HYDROCHLORIDE 2.5 MG/ML
2 INJECTION, SOLUTION INFILTRATION; PERINEURAL
Status: COMPLETED | OUTPATIENT
Start: 2022-08-25 | End: 2022-08-25

## 2022-08-25 RX ORDER — LIDOCAINE HYDROCHLORIDE 10 MG/ML
2 INJECTION, SOLUTION INFILTRATION; PERINEURAL
Status: COMPLETED | OUTPATIENT
Start: 2022-08-25 | End: 2022-08-25

## 2022-08-25 RX ADMIN — METHYLPREDNISOLONE ACETATE 2 ML: 40 INJECTION, SUSPENSION INTRA-ARTICULAR; INTRALESIONAL; INTRAMUSCULAR; SOFT TISSUE at 16:20

## 2022-08-25 RX ADMIN — BUPIVACAINE HYDROCHLORIDE 2 ML: 2.5 INJECTION, SOLUTION INFILTRATION; PERINEURAL at 16:02

## 2022-08-25 RX ADMIN — LIDOCAINE HYDROCHLORIDE 2 ML: 10 INJECTION, SOLUTION INFILTRATION; PERINEURAL at 16:02

## 2022-08-25 NOTE — PROGRESS NOTES
Patient Name:  Matt Meyer  MRN:  [id number]    [address] [place] [address] [place]     1  Left knee pain, unspecified chronicity  -     XR knee 4+ vw left injury; Future; Expected date: 08/25/2022  -     XR knee 1 or 2 vw right; Future; Expected date: 08/25/2022    2  Primary osteoarthritis of left knee  -     Ambulatory Referral to Physical Therapy; Future        80 y o  female with Left knee osteoarthritis  X-rays reviewed in office today with patient  In depth discussion had with patient regarding continued conservative management of Left  knee osteoarthritis including OTC oral analgesics, topical analgesics, formal outpatient physical therapy for strengthening of quads, hamstrings, hip abductors, ice application, Lateral  bracing, corticosteroid, viscosupplementation injections  Patient verbalized understanding of the above and would like to proceed forward with Left knee corticosteroid injection  Risks and benefits of corticosteroid injection were discussed in detail  Risks including:  post injection pain, elevation in blood sugar, skin discoloration, fatty atrophy, and infection were discussed in detail  The patient understood and elected to proceed forward  After sterile preparation the Left knee was injected with 2 cc of 1% lidocaine, 2 cc of 0 25% bupivacaine, and 2 cc of Depo-Medrol  The patient tolerated the procedure and no immediate complications were noted  The patient was instructed to ice and elevate the injection site, limit strenuous activity for the next 24-48 hours, and contact us if there were any questions or concerns prior to their follow-up appointment  I will see the patient back in 3 months or on an as needed basis pending symptoms  Chief Complaint     Left knee pain    History of the Present Illness     Matt Meyer is a 80 y o  female with Left knee pain ongoing for many years   Patient admits to recent "buckling" event wheil ambulating the stairs while in [place] and fall onto buttocks  Patient did hit her head and report to the ED once they returned from their vacation  Patient admits to Left knee pain with ambulation, most noted at lateral aspect  Patient has history of Right total knee and Left total hip arthroplasty performed many years ago without complications  Review of Systems     Review of Systems   Constitutional: Negative for chills and fever  HENT: Negative for ear pain and sore throat  Eyes: Negative for pain and visual disturbance  Respiratory: Negative for cough and shortness of breath  Cardiovascular: Negative for chest pain and palpitations  Gastrointestinal: Negative for abdominal pain and vomiting  Genitourinary: Negative for dysuria and hematuria  Musculoskeletal: Negative for arthralgias and back pain  Skin: Negative for color change and rash  Neurological: Negative for seizures and syncope  All other systems reviewed and are negative  Physical Exam     Ht 5' 2" (1 575 m)   Wt 82 6 kg (182 lb)   BMI 33 29 kg/m²     Left  Knee  Range of motion from 2 to 100  There is crepitus with range of motion  There is no effusion  There is tenderness over the lateral joint line  There is 5/5 quadriceps strength and decresed tone  The patient is able to perform a straight leg raise  negative patellar grind test   Varus stress testing reveals no pain or instability at 0 and 30 degrees   Valgus stress testing reveals no pain or instability at 0 and 30 degrees  The patient is neurovascular intact distally  Eyes:  Anicteric sclerae  Neck:  Supple  Lungs:  Normal respiratory effort  Cardiovascular:  Capillary refill is less than 2 seconds  Skin:  Intact without erythema  Neurologic:  Sensation grossly intact to light touch  Psychiatric:  Mood and affect are appropriate      Data Review     I have personally reviewed pertinent films in PACS, and my interpretation follows:    X-rays taken 08/25/2022 of Left knee demonstrates severe tricompartmental osteoarthritis most noted at lateral and patellofemoral compartments  No obvious fracture noted    Past Medical History:   Diagnosis Date    Arthritis 2000    approximately    [facility] [address] [address]     Cancer ([facility] [address] ) 2010    approximately    HL (hearing loss) 2010    Hypertension     on and off    Visual impairment 2018    macular degeneration       Past Surgical History:   Procedure Laterality Date    EAR SURGERY      EYE SURGERY      cataracts    HIP SURGERY      JOINT REPLACEMENT  ?    knee & hip    KNEE SURGERY         No Known Allergies    Current Outpatient Medications on File Prior to Visit   Medication Sig Dispense Refill    Acetaminophen (Tylenol) 325 MG CAPS Take by mouth 2 (two) times a day as needed      enalapril (VASOTEC) 10 mg tablet Take 1 tablet (10 mg total) by mouth daily 30 tablet 0    furosemide (LASIX) 20 mg tablet Take one tab po every other day 45 tablet 1    Multiple Vitamins-Minerals (PreserVision AREDS 2+Multi Vit) CAPS Take by mouth      potassium chloride (MICRO-K) 10 MEQ CR capsule Take 2 tabs po when you take Furosemide  180 capsule 0    TURMERIC-GINGER PO Take by mouth      amoxicillin (AMOXIL) 500 MG tablet Take 500 mg by mouth 2 (two) times a day Before dental procedures (Patient not taking: No sig reported)      fluticasone (FLONASE) 50 mcg/act nasal spray 1 spray into each nostril daily (Patient not taking: No sig reported) 9 9 mL 5     No current facility-administered medications on file prior to visit  Social History     Tobacco Use    Smoking status: Current Some Day Smoker     Packs/day: 0 25     Years: 20 00     Pack years: 5 00     Types: Cigarettes    Smokeless tobacco: Never Used   Vaping Use    Vaping Use: Never used   Substance Use Topics    Alcohol use: Yes     Alcohol/week: 4 0 standard drinks     Types: 4 Standard drinks or equivalent per week    Drug use: Never       History reviewed   No pertinent family history            Procedures Performed     Large joint arthrocentesis: L knee  Universal Protocol:  Consent given by: patient  Patient identity confirmed: verbally with patient    Procedure Details  Location: knee - L knee  Needle size: 22 G  Approach: lateral  Medications administered: 2 mL bupivacaine 0 25 %; 2 mL lidocaine 1 %; 2 mL methylPREDNISolone acetate 40 mg/mL    Patient tolerance: patient tolerated the procedure well with no immediate complications  Dressing:  Sterile dressing applied              Orin Jamison PA-C

## 2022-08-29 ENCOUNTER — TELEPHONE (OUTPATIENT)
Dept: INTERNAL MEDICINE CLINIC | Facility: CLINIC | Age: 87
End: 2022-08-29

## 2022-08-29 DIAGNOSIS — I10 HYPERTENSION: ICD-10-CM

## 2022-08-29 RX ORDER — ENALAPRIL MALEATE 10 MG/1
10 TABLET ORAL DAILY
Qty: 30 TABLET | Refills: 3 | Status: SHIPPED | OUTPATIENT
Start: 2022-08-29 | End: 2023-01-03 | Stop reason: SDUPTHER

## 2022-08-29 NOTE — TELEPHONE ENCOUNTER
Patient was in the ER on August 20 and she was put on enalapril 10 mg tablet  It is a 30 day supply and no refills  Her caretaker, Bro Martinez, would like to know if she should continue this and if so a refill would need to be sent to pharmacy?      [facility]/[place]    Call back #[phone]

## 2022-08-29 NOTE — TELEPHONE ENCOUNTER
Spoke with Desiree Shah and made her aware that as per Dr Juanito Izaguirre, "Prescription sent; continue med; f/u with edis as scheduled " She verbalized understanding

## 2022-08-31 ENCOUNTER — EVALUATION (OUTPATIENT)
Dept: PHYSICAL THERAPY | Facility: CLINIC | Age: 87
End: 2022-08-31
Payer: MEDICARE

## 2022-08-31 DIAGNOSIS — M17.12 PRIMARY OSTEOARTHRITIS OF LEFT KNEE: ICD-10-CM

## 2022-08-31 PROCEDURE — 97161 PT EVAL LOW COMPLEX 20 MIN: CPT | Performed by: PHYSICAL THERAPIST

## 2022-08-31 PROCEDURE — 97112 NEUROMUSCULAR REEDUCATION: CPT | Performed by: PHYSICAL THERAPIST

## 2022-08-31 NOTE — PROGRESS NOTES
PT Evaluation     Today's date: 2022  Patient name: Martha Monaco  : 1929  MRN: [id number]  Referring provider: Catherine French DO  Dx:   Encounter Diagnosis     ICD-10-CM    1  Primary osteoarthritis of left knee  M17 12 Ambulatory Referral to Physical Therapy                  Assessment  Assessment details: Pt is a 81 y/o female who presents to physical therapy with nociceptive pain associated with acute L knee pain s/p fall in the environment of reduced L hip strength  Pt does not present with any red flag symptoms at this time  Signs and symptoms consistent with this include warmth and swelling around the joint, pain with knee extension and flexion, pain with weight acceptance, and strength testing limited by pain  This makes it difficult for her to walk without pain  Pt has not been going to her regular exercise classes as well due to the pain  Educated pt today on acute pain and natural timeframe, as well as returning to her exercise classes and the importance of continuing to move her knee to assist in recovery  Pt was given HEP focusing on frequent movement of the knee and hip to assist with this  Pt would benefit from skilled physical therapy in order to decrease deficits and return to prior level of function  Impairments: abnormal gait, abnormal or restricted ROM, activity intolerance, impaired physical strength, pain with function and poor posture     Symptom irritability: lowUnderstanding of Dx/Px/POC: good   Prognosis: good  Prognosis details: Positive Prognostic Factors: Positive attitude toward therapy    Negative Prognostic Factors: chronicity of symptoms    Goals  STG (2 weeks):  Pt will be independent with HEP  Pt will report reduction in SPR by 2-3 points  LTG (4-6 weeks):  Pt will report no difficulty with weekly exercise classes  Pt will demonstrate no pain with STS        Plan  Patient would benefit from: skilled physical therapy  Planned modality interventions: cryotherapy  Planned therapy interventions: manual therapy, neuromuscular re-education, patient education, self care, strengthening, stretching, therapeutic activities, therapeutic exercise and home exercise program  Frequency: 1x week  Duration in weeks: 4  Treatment plan discussed with: patient        Subjective Evaluation    History of Present Illness  Mechanism of injury: Chief Complaint: Pt reports trying to go up one step while at her cottage in [facility] ([facility])  Her L knee buckled and she fell backward  She came back home to this area prior to going to ED  CT and radiographs unremarkable for knee pathology or concussion/hemorrhaging  Pt reports continued ant knee pain that is fairly constant  She reports having pain that made it difficult for her to sleep since the injury but reports having no pain last night and was able to sleep fine  24 hour Pattern: None  HPI: None for L knee, R knee TKA, and L hip AMANDA    Severity: high  Irritability: pt reports constant at this time  Nature: Nociceptive  Stage: acute  Stability: no change    P1: ant knee pain, sharp, 8-8-8  Aggs/eases: walking; nothing    Physical Activity:chair classes at home 3x/week, stopped them since injuring knee  Sleep: Reported above  Occupation: retired  Patient Goals  Patient goals for therapy: decreased pain          Objective     Observations   Left Knee   Positive for edema  Additional Observation Details  Warmth to the touch of the L knee    Tenderness   Left Knee   Tenderness in the inferior patella, lateral joint line, medial joint line and medial patella       Active Range of Motion   Left Knee   Flexion: 100 degrees   Extension: 3 degrees     Right Knee   Flexion: WFL  Extension: [facility]    Additional Active Range of Motion Details  Crepitus noted in L knee during knee ext    Strength/Myotome Testing     Left Knee   Extension: 4+    Right Knee   Extension: 5    Additional Strength Details  Hip:  Flex: 4/5 b/l  Abd: 3/5 L; 4/5 R  Add: 5/5 b/l    General Comments:      Knee Comments  Difficulty standing from the chair, unable to complete without HHA, only reported minimal pain with this  Noted significant knee valgus during ambulation, that appeared to be constant, as if structural rather than dynamic  Pt reported pain with ambulation today as well with each initial contact             Precautions: HTN, L AMANDA, R TKA      Manuals 8/31                                                                Neuro Re-Ed             Pt education THANG                                                                                          Ther Ex             LAQ 2x10 HEP            Marching [id number]            Seated clamshell             Standing hip abd             Standing hip ext                                                    Ther Activity             STS                          Gait Training                                       Modalities

## 2022-09-14 ENCOUNTER — OFFICE VISIT (OUTPATIENT)
Dept: PHYSICAL THERAPY | Facility: CLINIC | Age: 87
End: 2022-09-14
Payer: MEDICARE

## 2022-09-14 DIAGNOSIS — M17.12 PRIMARY OSTEOARTHRITIS OF LEFT KNEE: Primary | ICD-10-CM

## 2022-09-14 PROCEDURE — 97110 THERAPEUTIC EXERCISES: CPT | Performed by: PHYSICAL THERAPIST

## 2022-09-14 NOTE — PROGRESS NOTES
PT Discharge     Today's date: 2022  Patient name: Odalys Abdalla  : 1929  MRN: [id number]  Referring provider: Nadira Lovelace DO  Dx:   Encounter Diagnosis     ICD-10-CM    1  Primary osteoarthritis of left knee  M17 12 Ambulatory Referral to Physical Therapy                  Assessment  Assessment details: Pt is a 81 y/o female who presents to physical therapy with nociceptive pain associated with acute L knee pain s/p fall in the environment of reduced L hip strength  Pt reports % improvement in her symptoms since IE  She reports returning to her daily activities without difficulty  She presents with good objective improvements as well  Pt is (I) with HEP  PT and pt discussed d/c today, pt agreeable  Goals  STG (2 weeks): - MET  Pt will be independent with HEP  Pt will report reduction in SPR by 2-3 points  LTG (4-6 weeks): - MET  Pt will report no difficulty with weekly exercise classes  Pt will demonstrate no pain with STS  Plan: d/c        Subjective Evaluation    History of Present Illness  Mechanism of injury: Chief Complaint: Pt reports % improvement in symptoms since IE  She has been completing HEP as prescribed  No difficulty with normal activities at this time  24 hour Pattern: None  HPI: None for L knee, R knee TKA, and L hip AMANDA    Severity: high  Irritability: pt reports constant at this time  Nature: Nociceptive  Stage: acute  Stability: no change    P1: ant knee pain, sharp, 0-0-8  Aggs/eases: walking; nothing    Physical Activity:chair classes at home 3x/week, stopped them since injuring knee  Sleep: Reported above  Occupation: retired  Patient Goals  Patient goals for therapy: decreased pain          Objective     Observations   Left Knee   Positive for edema       Additional Observation Details  Warmth to the touch of the L knee    Tenderness   Left Knee   Tenderness in the inferior patella, lateral joint line, medial joint line and medial patella       Active Range of Motion   Left Knee   Flexion: 100 degrees   Extension: 3 degrees     Right Knee   Flexion: WFL  Extension: [facility]    Additional Active Range of Motion Details  Crepitus noted in L knee during knee ext    Strength/Myotome Testing     Left Knee   Extension: 5    Right Knee   Extension: 5    Additional Strength Details  Hip:  Flex: 4/5 b/l  Abd: 3/5 L; 4/5 R  Add: 5/5 b/l    General Comments:      Knee Comments  Difficulty standing from the chair, unable to complete without HHA, only reported minimal pain with this  Noted significant knee valgus during ambulation, that appeared to be constant, as if structural rather than dynamic  Pt reported pain with ambulation today as well with each initial contact             Precautions: HTN, L AMANDA, R TKA      Manuals 8/31 9/14                                                               Neuro Re-Ed             Pt education Heron Rued                                                                                         Ther Ex             LAQ 2x10 HEP            Marching [id number]            Seated clamshell             Standing hip abd             Standing hip ext                                                    Ther Activity             STS                          Gait Training                                       Modalities

## 2022-10-03 ENCOUNTER — OFFICE VISIT (OUTPATIENT)
Dept: INTERNAL MEDICINE CLINIC | Facility: CLINIC | Age: 87
End: 2022-10-03
Payer: MEDICARE

## 2022-10-03 VITALS
SYSTOLIC BLOOD PRESSURE: 120 MMHG | DIASTOLIC BLOOD PRESSURE: 68 MMHG | OXYGEN SATURATION: 98 % | BODY MASS INDEX: 33.71 KG/M2 | HEIGHT: 62 IN | HEART RATE: 71 BPM | WEIGHT: 183.2 LBS | RESPIRATION RATE: 16 BRPM

## 2022-10-03 DIAGNOSIS — R03.0 ELEVATED BLOOD PRESSURE READING: ICD-10-CM

## 2022-10-03 DIAGNOSIS — R60.0 LOCALIZED EDEMA: ICD-10-CM

## 2022-10-03 DIAGNOSIS — Z00.00 MEDICARE ANNUAL WELLNESS VISIT, SUBSEQUENT: Primary | ICD-10-CM

## 2022-10-03 PROBLEM — R06.02 SHORTNESS OF BREATH: Status: RESOLVED | Noted: 2022-07-11 | Resolved: 2022-10-03

## 2022-10-03 PROCEDURE — 99214 OFFICE O/P EST MOD 30 MIN: CPT | Performed by: NURSE PRACTITIONER

## 2022-10-03 PROCEDURE — G0439 PPPS, SUBSEQ VISIT: HCPCS | Performed by: NURSE PRACTITIONER

## 2022-10-03 NOTE — PROGRESS NOTES
Assessment and Plan:     Problem List Items Addressed This Visit    None         Depression Screening and Follow-up Plan: Patient was screened for depression during today's encounter  They screened negative with a PHQ-2 score of 0  Tobacco Cessation Counseling: Tobacco cessation counseling was provided  The patient is sincerely urged to quit consumption of tobacco  She is not ready to quit tobacco        Preventive health issues were discussed with patient, and age appropriate screening tests were ordered as noted in patient's After Visit Summary  Personalized health advice and appropriate referrals for health education or preventive services given if needed, as noted in patient's After Visit Summary  History of Present Illness:     Patient presents for a Medicare Wellness Visit    Medicare wellness today  Patient Care Team:  Veronica Concepcion as PCP - General (Internal Medicine)     Review of Systems:     Review of Systems   Constitutional: Negative  Respiratory: Negative  Cardiovascular: Negative  Musculoskeletal: Negative  Psychiatric/Behavioral: Negative           Problem List:     Patient Active Problem List   Diagnosis    Localized edema    Wound of skin    Anasarca    Elevated blood pressure reading    Rhinorrhea    Medicare annual wellness visit, subsequent    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    Osteoarthritis of toe    Shortness of breath    Palpitations    Abnormal EKG    Bifascicular block      Past Medical and Surgical History:     Past Medical History:   Diagnosis Date    Arthritis 2000    approximately    Breast cancer [facility] [address]     Cancer ([facility] [address] ) 2010    approximately    HL (hearing loss) 2010    Hypertension     on and off    Visual impairment 2018    macular degeneration     Past Surgical History:   Procedure Laterality Date    EAR SURGERY      EYE SURGERY      cataracts    HIP SURGERY      JOINT REPLACEMENT  ?    knee & hip    KNEE SURGERY Family History:     History reviewed  No pertinent family history  Social History:     Social History     Socioeconomic History    Marital status:      Spouse name: None    Number of children: None    Years of education: None    Highest education level: None   Occupational History    None   Tobacco Use    Smoking status: Current Some Day Smoker     Packs/day: 0 25     Years: 20 00     Pack years: 5 00     Types: Cigarettes    Smokeless tobacco: Never Used   Vaping Use    Vaping Use: Never used   Substance and Sexual Activity    Alcohol use: Yes     Alcohol/week: 4 0 standard drinks     Types: 4 Standard drinks or equivalent per week    Drug use: Never    Sexual activity: Not Currently     Birth control/protection: Post-menopausal   Other Topics Concern    None   Social History Narrative    None     Social Determinants of Health     Financial Resource Strain: Low Risk     Difficulty of Paying Living Expenses: Not hard at all   Food Insecurity: Not on file   Transportation Needs: No Transportation Needs    Lack of Transportation (Medical): No    Lack of Transportation (Non-Medical): No   Physical Activity: Not on file   Stress: Not on file   Social Connections: Not on file   Intimate Partner Violence: Not on file   Housing Stability: Not on file      Medications and Allergies:     Current Outpatient Medications   Medication Sig Dispense Refill    Acetaminophen (Tylenol) 325 MG CAPS Take by mouth 2 (two) times a day as needed      amoxicillin (AMOXIL) 500 MG tablet Take 500 mg by mouth 2 (two) times a day Before dental procedures      enalapril (VASOTEC) 10 mg tablet Take 1 tablet (10 mg total) by mouth daily 30 tablet 3    furosemide (LASIX) 20 mg tablet Take one tab po every other day 45 tablet 1    Multiple Vitamins-Minerals (PreserVision AREDS 2+Multi Vit) CAPS Take by mouth      potassium chloride (MICRO-K) 10 MEQ CR capsule Take 2 tabs po when you take Furosemide   180 capsule 0    TURMERIC-MIKE PO Take by mouth      fluticasone (FLONASE) 50 mcg/act nasal spray 1 spray into each nostril daily (Patient not taking: No sig reported) 9 9 mL 5     No current facility-administered medications for this visit  No Known Allergies   Immunizations:     Immunization History   Administered Date(s) Administered    COVID-19 MODERNA VACC 0 5 ML IM 01/26/2021, 03/04/2021      Health Maintenance: There are no preventive care reminders to display for this patient  Topic Date Due    Pneumococcal Vaccine: 65+ Years (1 - PCV) Never done    COVID-19 Vaccine (3 - Booster for Moderna series) 08/04/2021    Influenza Vaccine (1) Never done      Medicare Screening Tests and Risk Assessments:     Clem Frye is here for her Subsequent Wellness visit  Health Risk Assessment:   Patient rates overall health as good  Patient feels that their physical health rating is same  Patient is satisfied with their life  Eyesight was rated as same  Hearing was rated as same  Patient feels that their emotional and mental health rating is same  Patients states they are never, rarely angry  Patient states they are never, rarely unusually tired/fatigued  Pain experienced in the last 7 days has been some  Patient's pain rating has been 5/10  Patient states that she has experienced no weight loss or gain in last 6 months  Depression Screening:   PHQ-2 Score: 0      Fall Risk Screening: In the past year, patient has experienced: history of falling in past year    Number of falls: 1  Injured during fall?: No    Feels unsteady when standing or walking?: Yes    Worried about falling?: Yes      Urinary Incontinence Screening:   Patient has not leaked urine accidently in the last six months  Home Safety:  Patient has trouble with stairs inside or outside of their home  Patient has working smoke alarms and has working carbon monoxide detector  Home safety hazards include: none       Nutrition:   Current diet is Regular  Medications:   Patient is currently taking over-the-counter supplements  OTC medications include: see medication list  Patient is not able to manage medications  Caretaker handles her medications  Activities of Daily Living (ADLs)/Instrumental Activities of Daily Living (IADLs):   Walk and transfer into and out of bed and chair?: Yes  Dress and groom yourself?: Yes    Bathe or shower yourself?: No    Feed yourself? Yes  Do your laundry/housekeeping?: No  Manage your money, pay your bills and track your expenses?: No  Make your own meals?: No    Do your own shopping?: No    ADL comments: Family members help with bathing, shopping, meals and managing her finances  Previous Hospitalizations:   Any hospitalizations or ED visits within the last 12 months?: Yes    How many hospitalizations have you had in the last year?: 1-2    Hospitalization Comments: ER for fall 2022    Advance Care Planning:   Living will: Yes    Advanced directive: Yes      PREVENTIVE SCREENINGS      Cardiovascular Screening:    General: Screening Current      Diabetes Screening:     General: Screening Current      Colorectal Cancer Screening:     General: Screening Not Indicated      Breast Cancer Screening:     General: History Breast Cancer      Cervical Cancer Screening:    General: Screening Not Indicated      Osteoporosis Screening:    General: Risks and Benefits Discussed and Patient Declines      Lung Cancer Screening:     General: Screening Not Indicated    Screening, Brief Intervention, and Referral to Treatment (SBIRT)    Screening  Typical number of drinks in a day: 0  Typical number of drinks in a week: 0  Interpretation: Low risk drinking behavior      AUDIT-C Screenin) How often did you have a drink containing alcohol in the past year? monthly or less  3) How often did you have 6 or more drinks on one occasion in the past year? never    No exam data present     Physical Exam:     There were no vitals taken for this visit  Physical Exam  Vitals and nursing note reviewed  Constitutional:       General: She is not in acute distress  Appearance: She is well-developed  HENT:      Head: Normocephalic and atraumatic  Eyes:      Conjunctiva/sclera: Conjunctivae normal    Cardiovascular:      Rate and Rhythm: Normal rate and regular rhythm  Heart sounds: No murmur heard  Pulmonary:      Effort: Pulmonary effort is normal  No respiratory distress  Breath sounds: Normal breath sounds  Abdominal:      Palpations: Abdomen is soft  Tenderness: There is no abdominal tenderness  Musculoskeletal:      Cervical back: Neck supple  Skin:     General: Skin is warm and dry  Neurological:      Mental Status: She is alert            [place], [place]

## 2022-10-03 NOTE — PROGRESS NOTES
Assessment/Plan:    1  Medicare wellness completed  2  Hypertension- At goal   3  LE swelling- Continue Furosemide once weekly with potassium tablet  Elevate legs  Diagnoses and all orders for this visit:    Medicare annual wellness visit, subsequent    Elevated blood pressure reading    Localized edema    The patient was counseled regarding instructions for management, risk factor reductions, patient and family education,impressions, risks and benefits of treatment options, side effects of medications, importance of compliance with treatment  The treatment plan was reviewed with the patient/guardian and patient/guardian understands and agrees with the treatment plan  Current Outpatient Medications:     Acetaminophen (Tylenol) 325 MG CAPS, Take by mouth 2 (two) times a day as needed, Disp: , Rfl:     amoxicillin (AMOXIL) 500 MG tablet, Take 500 mg by mouth 2 (two) times a day Before dental procedures, Disp: , Rfl:     enalapril (VASOTEC) 10 mg tablet, Take 1 tablet (10 mg total) by mouth daily, Disp: 30 tablet, Rfl: 3    furosemide (LASIX) 20 mg tablet, Take one tab po every other day, Disp: 45 tablet, Rfl: 1    Multiple Vitamins-Minerals (PreserVision AREDS 2+Multi Vit) CAPS, Take by mouth, Disp: , Rfl:     potassium chloride (MICRO-K) 10 MEQ CR capsule, Take 2 tabs po when you take Furosemide  , Disp: 180 capsule, Rfl: 0    TURMERIC-GINGER PO, Take by mouth, Disp: , Rfl:     Subjective:      Patient ID: Liam Thomas is a 80 y o  female  Still having B/L LE, has improved with application of TEDS  The following portions of the patient's history were reviewed and updated as appropriate:   She has a past medical history of Arthritis (2000), Breast cancer ([facility] [address] Cancer ([facility] [address] ) (2010), HL (hearing loss) (2010), Hypertension, and Visual impairment (2018)  ,  does not have any pertinent problems on file  ,   has a past surgical history that includes EAR SURGERY; Hip surgery; Knee surgery;  Eye surgery; and Joint replacement (?) ,  family history is not on file  ,   reports that she has been smoking cigarettes  She has a 5 00 pack-year smoking history  She has never used smokeless tobacco  She reports current alcohol use of about 4 0 standard drinks of alcohol per week  She reports that she does not use drugs  ,  has No Known Allergies       Review of Systems   Constitutional: Negative  Respiratory: Negative  Cardiovascular: Positive for leg swelling  Musculoskeletal: Negative  Psychiatric/Behavioral: Negative            Objective:  /68 (BP Location: Left arm, Patient Position: Sitting, Cuff Size: Adult)   Pulse 71   Resp 16   Ht 5' 2" (1 575 m)   Wt 83 1 kg (183 lb 3 2 oz)   SpO2 98%   BMI 33 51 kg/m²     Lab Review  Admission on 08/20/2022, Discharged on 08/20/2022   Component Date Value    WBC 08/20/2022 8 99     RBC 08/20/2022 4 82     Hemoglobin 08/20/2022 14 5     Hematocrit 08/20/2022 43 5     MCV 08/20/2022 90     MCH 08/20/2022 30 1     MCHC 08/20/2022 33 3     RDW 08/20/2022 15 3 (A)    MPV 08/20/2022 8 7 (A)    Platelets 63/85/1715 247     nRBC 08/20/2022 0     Neutrophils Relative 08/20/2022 71     Immat GRANS % 08/20/2022 0     Lymphocytes Relative 08/20/2022 20     Monocytes Relative 08/20/2022 7     Eosinophils Relative 08/20/2022 2     Basophils Relative 08/20/2022 0     Neutrophils Absolute 08/20/2022 6 34     Immature Grans Absolute 08/20/2022 0 03     Lymphocytes Absolute 08/20/2022 1 79     Monocytes Absolute 08/20/2022 0 66     Eosinophils Absolute 08/20/2022 0 14     Basophils Absolute 08/20/2022 0 03     Sodium 08/20/2022 142     Potassium 08/20/2022 4 3     Chloride 08/20/2022 108     CO2 08/20/2022 24     ANION GAP 08/20/2022 10     BUN 08/20/2022 19     Creatinine 08/20/2022 1 28     Glucose 08/20/2022 108     Calcium 08/20/2022 9 3     eGFR 08/20/2022 36     Total Bilirubin 08/20/2022 0 57     Bilirubin, Direct 08/20/2022 0 17  Alkaline Phosphatase 08/20/2022 81     AST 08/20/2022 20     ALT 08/20/2022 15     Total Protein 08/20/2022 7 2     Albumin 08/20/2022 3 6     hs TnI 0hr 08/20/2022 7     Protime 08/20/2022 14 5     INR 08/20/2022 1 15     PTT 08/20/2022 28     NT-proBNP 08/20/2022 617 (A)    SARS-CoV-2 08/20/2022 Negative     INFLUENZA A PCR 08/20/2022 Negative     INFLUENZA B PCR 08/20/2022 Negative     RSV PCR 08/20/2022 Negative     Ventricular Rate 08/20/2022 68     Atrial Rate 08/20/2022 68     OR Interval 08/20/2022 302     QRSD Interval 08/20/2022 130     QT Interval 08/20/2022 438     QTC Interval 08/20/2022 465     P Axis 08/20/2022 31     QRS Axis 08/20/2022 -56     T Wave Axis 08/20/2022 4     hs TnI 2hr 08/20/2022 6     Delta 2hr hsTnI 08/20/2022 -1         Imaging: No results found  Physical Exam  Constitutional:       Appearance: She is well-developed  Cardiovascular:      Rate and Rhythm: Normal rate and regular rhythm  Heart sounds: Normal heart sounds  Pulmonary:      Effort: Pulmonary effort is normal       Breath sounds: Normal breath sounds  Musculoskeletal:         General: Normal range of motion  Left lower leg: Edema present  Neurological:      Mental Status: She is alert and oriented to person, place, and time  Deep Tendon Reflexes: Reflexes are normal and symmetric  Psychiatric:         Behavior: Behavior normal          Thought Content:  Thought content normal          Judgment: Judgment normal

## 2022-10-03 NOTE — PATIENT INSTRUCTIONS
Medicare Preventive Visit Patient Instructions  Thank you for completing your Welcome to Medicare Visit or Medicare Annual Wellness Visit today  Your next wellness visit will be due in one year (10/4/2023)  The screening/preventive services that you may require over the next 5-10 years are detailed below  Some tests may not apply to you based off risk factors and/or age  Screening tests ordered at today's visit but not completed yet may show as past due  Also, please note that scanned in results may not display below  Preventive Screenings:  Service Recommendations Previous Testing/Comments   Colorectal Cancer Screening  * Colonoscopy    * Fecal Occult Blood Test (FOBT)/Fecal Immunochemical Test (FIT)  * Fecal DNA/Cologuard Test  * Flexible Sigmoidoscopy Age: 39-70 years old   Colonoscopy: every 10 years (may be performed more frequently if at higher risk)  OR  FOBT/FIT: every 1 year  OR  Cologuard: every 3 years  OR  Sigmoidoscopy: every 5 years  Screening may be recommended earlier than age 39 if at higher risk for colorectal cancer  Also, an individualized decision between you and your healthcare provider will decide whether screening between the ages of 74-80 would be appropriate  Colonoscopy: Not on file  FOBT/FIT: Not on file  Cologuard: Not on file  Sigmoidoscopy: Not on file    Screening Not Indicated     Breast Cancer Screening Age: 36 years old  Frequency: every 1-2 years  Not required if history of left and right mastectomy Mammogram: Not on file    History Breast Cancer   Cervical Cancer Screening Between the ages of 21-29, pap smear recommended once every 3 years  Between the ages of 33-67, can perform pap smear with HPV co-testing every 5 years     Recommendations may differ for women with a history of total hysterectomy, cervical cancer, or abnormal pap smears in past  Pap Smear: Not on file    Screening Not Indicated   Hepatitis C Screening Once for adults born between [facility] frequently in patients at high risk for Hepatitis C Hep C Antibody: Not on file        Diabetes Screening 1-2 times per year if you're at risk for diabetes or have pre-diabetes Fasting glucose: 113 mg/dL (7/27/2022)  A1C: No results in last 5 years (No results in last 5 years)  Screening Current   Cholesterol Screening Once every 5 years if you don't have a lipid disorder  May order more often based on risk factors  Lipid panel: 07/13/2022    Screening Current     Other Preventive Screenings Covered by Medicare:  Abdominal Aortic Aneurysm (AAA) Screening: covered once if your at risk  You're considered to be at risk if you have a family history of AAA  Lung Cancer Screening: covers low dose CT scan once per year if you meet all of the following conditions: (1) Age 50-69; (2) No signs or symptoms of lung cancer; (3) Current smoker or have quit smoking within the last 15 years; (4) You have a tobacco smoking history of at least 20 pack years (packs per day multiplied by number of years you smoked); (5) You get a written order from a healthcare provider  Glaucoma Screening: covered annually if you're considered high risk: (1) You have diabetes OR (2) Family history of glaucoma OR (3)  aged 48 and older OR (3)  American aged 72 and older  Osteoporosis Screening: covered every 2 years if you meet one of the following conditions: (1) You're estrogen deficient and at risk for osteoporosis based off medical history and other findings; (2) Have a vertebral abnormality; (3) On glucocorticoid therapy for more than 3 months; (4) Have primary hyperparathyroidism; (5) On osteoporosis medications and need to assess response to drug therapy  Last bone density test (DXA Scan): Not on file  HIV Screening: covered annually if you're between the age of 12-76  Also covered annually if you are younger than 13 and older than 72 with risk factors for HIV infection   For pregnant patients, it is covered up to 3 times per pregnancy  Immunizations:  Immunization Recommendations   Influenza Vaccine Annual influenza vaccination during flu season is recommended for all persons aged >= 6 months who do not have contraindications   Pneumococcal Vaccine   * Pneumococcal conjugate vaccine = PCV13 (Prevnar 13), PCV15 (Vaxneuvance), PCV20 (Prevnar 20)  * Pneumococcal polysaccharide vaccine = PPSV23 (Pneumovax) Adults 25-60 years old: 1-3 doses may be recommended based on certain risk factors  Adults 72 years old: 1-2 doses may be recommended based off what pneumonia vaccine you previously received   Hepatitis B Vaccine 3 dose series if at intermediate or high risk (ex: diabetes, end stage renal disease, liver disease)   Tetanus (Td) Vaccine - COST NOT COVERED BY MEDICARE PART B Following completion of primary series, a booster dose should be given every 10 years to maintain immunity against tetanus  Td may also be given as tetanus wound prophylaxis  Tdap Vaccine - COST NOT COVERED BY MEDICARE PART B Recommended at least once for all adults  For pregnant patients, recommended with each pregnancy  Shingles Vaccine (Shingrix) - COST NOT COVERED BY MEDICARE PART B  2 shot series recommended in those aged 48 and above     Health Maintenance Due:  There are no preventive care reminders to display for this patient  Immunizations Due:      Topic Date Due    Pneumococcal Vaccine: 65+ Years (1 - PCV) Never done    COVID-19 Vaccine (3 - Booster for Moderna series) 08/04/2021    Influenza Vaccine (1) Never done     Advance Directives   What are advance directives? Advance directives are legal documents that state your wishes and plans for medical care  These plans are made ahead of time in case you lose your ability to make decisions for yourself  Advance directives can apply to any medical decision, such as the treatments you want, and if you want to donate organs  What are the types of advance directives?   There are many types of advance directives, and each state has rules about how to use them  You may choose a combination of any of the following:  Living will: This is a written record of the treatment you want  You can also choose which treatments you do not want, which to limit, and which to stop at a certain time  This includes surgery, medicine, IV fluid, and tube feedings  Durable power of  for healthcare [place] [facility]): This is a written record that states who you want to make healthcare choices for you when you are unable to make them for yourself  This person, called a proxy, is usually a family member or a friend  You may choose more than 1 proxy  Do not resuscitate (DNR) order:  A DNR order is used in case your heart stops beating or you stop breathing  It is a request not to have certain forms of treatment, such as CPR  A DNR order may be included in other types of advance directives  Medical directive: This covers the care that you want if you are in a coma, near death, or unable to make decisions for yourself  You can list the treatments you want for each condition  Treatment may include pain medicine, surgery, blood transfusions, dialysis, IV or tube feedings, and a ventilator (breathing machine)  Values history: This document has questions about your views, beliefs, and how you feel and think about life  This information can help others choose the care that you would choose  Why are advance directives important? An advance directive helps you control your care  Although spoken wishes may be used, it is better to have your wishes written down  Spoken wishes can be misunderstood, or not followed  Treatments may be given even if you do not want them  An advance directive may make it easier for your family to make difficult choices about your care  Fall Prevention    Fall prevention  includes ways to make your home and other areas safer  It also includes ways you can move more carefully to prevent a fall   Health conditions that cause changes in your blood pressure, vision, or muscle strength and coordination may increase your risk for falls  Medicines may also increase your risk for falls if they make you dizzy, weak, or sleepy  Fall prevention tips:   Stand or sit up slowly  Use assistive devices as directed  Wear shoes that fit well and have soles that   Wear a personal alarm  Stay active  Manage your medical conditions  Home Safety Tips:  Add items to prevent falls in the bathroom  Keep paths clear  Install bright lights in your home  Keep items you use often on shelves within reach  [place] or place reflective tape on the edges of your stairs  Cigarette Smoking and Your Health   Risks to your health if you smoke:  Nicotine and other chemicals found in tobacco damage every cell in your body  Even if you are a light smoker, you have an increased risk for cancer, heart disease, and lung disease  If you are pregnant or have diabetes, smoking increases your risk for complications  Benefits to your health if you stop smoking: You decrease respiratory symptoms such as coughing, wheezing, and shortness of breath  You reduce your risk for cancers of the lung, mouth, throat, kidney, bladder, pancreas, stomach, and cervix  If you already have cancer, you increase the benefits of chemotherapy  You also reduce your risk for cancer returning or a second cancer from developing  You reduce your risk for heart disease, blood clots, heart attack, and stroke  You reduce your risk for lung infections, and diseases such as pneumonia, asthma, chronic bronchitis, and emphysema  Your circulation improves  More oxygen can be delivered to your body  If you have diabetes, you lower your risk for complications, such as kidney, artery, and eye diseases  You also lower your risk for nerve damage  Nerve damage can lead to amputations, poor vision, and blindness    You improve your body's ability to heal and to fight infections  For more information and support to stop smoking:   [place]  Phone: 1- 885 - [phone]  Web Address: www [employer]  Weight Management   Why it is important to manage your weight:  Being overweight increases your risk of health conditions such as heart disease, high blood pressure, type 2 diabetes, and certain types of cancer  It can also increase your risk for osteoarthritis, sleep apnea, and other respiratory problems  Aim for a slow, steady weight loss  Even a small amount of weight loss can lower your risk of health problems  How to lose weight safely:  A safe and healthy way to lose weight is to eat fewer calories and get regular exercise  You can lose up about 1 pound a week by decreasing the number of calories you eat by 500 calories each day  Healthy meal plan for weight management:  A healthy meal plan includes a variety of foods, contains fewer calories, and helps you stay healthy  A healthy meal plan includes the following:  Eat whole-grain foods more often  A healthy meal plan should contain fiber  Fiber is the part of grains, fruits, and vegetables that is not broken down by your body  Whole-grain foods are healthy and provide extra fiber in your diet  Some examples of whole-grain foods are whole-wheat breads and pastas, oatmeal, brown rice, and bulgur  Eat a variety of vegetables every day  Include dark, leafy greens such as spinach, kale, amara greens, and mustard greens  Eat yellow and orange vegetables such as carrots, sweet potatoes, and winter squash  Eat a variety of fruits every day  Choose fresh or canned fruit (canned in its own juice or light syrup) instead of juice  Fruit juice has very little or no fiber  Eat low-fat dairy foods  Drink fat-free (skim) milk or 1% milk  Eat fat-free yogurt and low-fat cottage cheese  Try low-fat cheeses such as mozzarella and other reduced-fat cheeses  Choose meat and other protein foods that are low in fat  Choose beans or other legumes such as split peas or lentils  Choose fish, skinless poultry (chicken or turkey), or lean cuts of red meat (beef or pork)  Before you cook meat or poultry, cut off any visible fat  Use less fat and oil  Try baking foods instead of frying them  Add less fat, such as margarine, sour cream, regular salad dressing and mayonnaise to foods  Eat fewer high-fat foods  Some examples of high-fat foods include french fries, doughnuts, ice cream, and cakes  Eat fewer sweets  Limit foods and drinks that are high in sugar  This includes candy, cookies, regular soda, and sweetened drinks  Exercise:  Exercise at least 30 minutes per day on most days of the week  Some examples of exercise include walking, biking, dancing, and swimming  You can also fit in more physical activity by taking the stairs instead of the elevator or parking farther away from stores  Ask your healthcare provider about the best exercise plan for you  © Copyright [address] 2018 Information is for End User's use only and may not be sold, redistributed or otherwise used for commercial purposes  All illustrations and images included in CareNotes® are the copyrighted property of A D A M , Inc  or [facility] wellness completed  Hypertension- At goal   LE swelling- Continue Furosemide once weekly with potassium tablet  Elevate legs

## 2022-10-12 PROBLEM — Z00.00 MEDICARE ANNUAL WELLNESS VISIT, SUBSEQUENT: Status: RESOLVED | Noted: 2021-09-20 | Resolved: 2022-10-12

## 2023-01-03 DIAGNOSIS — I10 HYPERTENSION: ICD-10-CM

## 2023-01-03 RX ORDER — ENALAPRIL MALEATE 10 MG/1
10 TABLET ORAL DAILY
Qty: 30 TABLET | Refills: 0 | Status: SHIPPED | OUTPATIENT
Start: 2023-01-03

## 2023-01-16 ENCOUNTER — OFFICE VISIT (OUTPATIENT)
Dept: INTERNAL MEDICINE CLINIC | Facility: CLINIC | Age: 88
End: 2023-01-16

## 2023-01-16 VITALS
OXYGEN SATURATION: 95 % | HEART RATE: 78 BPM | BODY MASS INDEX: 32.02 KG/M2 | HEIGHT: 62 IN | RESPIRATION RATE: 18 BRPM | TEMPERATURE: 98.4 F | DIASTOLIC BLOOD PRESSURE: 72 MMHG | WEIGHT: 174 LBS | SYSTOLIC BLOOD PRESSURE: 118 MMHG

## 2023-01-16 DIAGNOSIS — J30.1 SEASONAL ALLERGIC RHINITIS DUE TO POLLEN: ICD-10-CM

## 2023-01-16 DIAGNOSIS — G89.29 CHRONIC LEFT SHOULDER PAIN: ICD-10-CM

## 2023-01-16 DIAGNOSIS — B49 FUNGAL INFECTION: ICD-10-CM

## 2023-01-16 DIAGNOSIS — I10 HYPERTENSION: Primary | ICD-10-CM

## 2023-01-16 DIAGNOSIS — R79.9 ABNORMAL FINDING OF BLOOD CHEMISTRY, UNSPECIFIED: ICD-10-CM

## 2023-01-16 DIAGNOSIS — I87.2 EDEMA OF LEFT LOWER LEG DUE TO PERIPHERAL VENOUS INSUFFICIENCY: ICD-10-CM

## 2023-01-16 DIAGNOSIS — R60.0 EDEMA OF LEFT LOWER LEG DUE TO PERIPHERAL VENOUS INSUFFICIENCY: ICD-10-CM

## 2023-01-16 DIAGNOSIS — M25.512 CHRONIC LEFT SHOULDER PAIN: ICD-10-CM

## 2023-01-16 PROBLEM — N18.30 STAGE 3 CHRONIC KIDNEY DISEASE, UNSPECIFIED WHETHER STAGE 3A OR 3B CKD (HCC): Status: ACTIVE | Noted: 2023-01-16

## 2023-01-16 PROBLEM — N18.30 STAGE 3 CHRONIC KIDNEY DISEASE, UNSPECIFIED WHETHER STAGE 3A OR 3B CKD (HCC): Status: RESOLVED | Noted: 2023-01-16 | Resolved: 2023-01-16

## 2023-01-16 RX ORDER — FLUTICASONE PROPIONATE 50 MCG
1 SPRAY, SUSPENSION (ML) NASAL DAILY
Qty: 16 G | Refills: 0 | Status: SHIPPED | OUTPATIENT
Start: 2023-01-16

## 2023-01-16 RX ORDER — NYSTATIN 100000 [USP'U]/G
POWDER TOPICAL 4 TIMES DAILY
Qty: 15 G | Refills: 3 | Status: SHIPPED | OUTPATIENT
Start: 2023-01-16 | End: 2023-01-30

## 2023-01-16 RX ORDER — ENALAPRIL MALEATE 10 MG/1
10 TABLET ORAL DAILY
Qty: 90 TABLET | Refills: 3 | Status: SHIPPED | OUTPATIENT
Start: 2023-01-16

## 2023-01-16 NOTE — PROGRESS NOTES
Assessment/Plan:     Here to establish care; accompanied by an aide; former [facility] pt  PMH for HTN that is well controlled  Reports left shoulder pain after a fall last summer; would like to see PT; order placed  9 pound weight loss noted; she says she has been more active with zomb[employer] and yoga  Good appetite;     Fungal infection under b/l breasts and groin  Quality Measures:     BMI Counseling: Body mass index is 31 83 kg/m²  The BMI is above normal  Nutrition recommendations include decreasing portion sizes and encouraging healthy choices of fruits and vegetables  Exercise recommendations include moderate physical activity 150 minutes/week  Rationale for BMI follow-up plan is due to patient being overweight or obese  Depression Screening and Follow-up Plan: Patient was screened for depression during today's encounter  They screened negative with a PHQ-2 score of 0  Tobacco Cessation Counseling: Tobacco cessation counseling was provided  The patient is sincerely urged to quit consumption of tobacco  She is not ready to quit tobacco           Return in about 3 months (around 4/16/2023)  No problem-specific Assessment & Plan notes found for this encounter  Diagnoses and all orders for this visit:    Stage 3 chronic kidney disease, unspecified whether stage 3a or 3b CKD (HCC)    Hypertension  -     enalapril (VASOTEC) 10 mg tablet; Take 1 tablet (10 mg total) by mouth daily  -     CBC and differential; Future  -     Comprehensive metabolic panel; Future  -     TSH, 3rd generation with Free T4 reflex; Future  -     Hemoglobin A1C; Future  -     Lipid Panel with Direct LDL reflex; Future    Abnormal finding of blood chemistry, unspecified  -     Hemoglobin A1C; Future    Seasonal allergic rhinitis due to pollen  -     fluticasone (FLONASE) 50 mcg/act nasal spray; 1 spray into each nostril daily    Chronic left shoulder pain  -     Ambulatory Referral to Physical Therapy;  Future    Fungal infection  -     nystatin (MYCOSTATIN) powder; Apply topically 4 (four) times a day for 14 days    Edema of left lower leg due to peripheral venous insufficiency  -     Ambulatory Referral to Wound Care; Future          Subjective:      Patient ID: Pavan Segal is a 80 y o  female  Here to establish care; former [facility] pt  ALLERGIES:  No Known Allergies    CURRENT MEDICATIONS:    Current Outpatient Medications:   •  Acetaminophen (Tylenol) 325 MG CAPS, Take by mouth 2 (two) times a day as needed, Disp: , Rfl:   •  enalapril (VASOTEC) 10 mg tablet, Take 1 tablet (10 mg total) by mouth daily, Disp: 90 tablet, Rfl: 3  •  fluticasone (FLONASE) 50 mcg/act nasal spray, 1 spray into each nostril daily, Disp: 16 g, Rfl: 0  •  furosemide (LASIX) 20 mg tablet, Take one tab po every other day, Disp: 45 tablet, Rfl: 1  •  Multiple Vitamins-Minerals (PreserVision AREDS 2+Multi Vit) CAPS, Take by mouth, Disp: , Rfl:   •  nystatin (MYCOSTATIN) powder, Apply topically 4 (four) times a day for 14 days, Disp: 15 g, Rfl: 3  •  potassium chloride (MICRO-K) 10 MEQ CR capsule, Take 2 tabs po when you take Furosemide  , Disp: 180 capsule, Rfl: 0  •  TURMERIC-GINGER PO, Take by mouth, Disp: , Rfl:     ACTIVE PROBLEM LIST:  Patient Active Problem List   Diagnosis   • Localized edema   • Wound of skin   • Anasarca   • Elevated blood pressure reading   • Rhinorrhea   • 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   • Osteoarthritis of toe   • Palpitations   • Abnormal EKG   • Bifascicular block   • Stage 3 chronic kidney disease, unspecified whether stage 3a or 3b CKD ([facility] [address] )       PAST MEDICAL HISTORY:  Past Medical History:   Diagnosis Date   • Arthritis 2000    approximately   • Breast cancer ([address] )    • Cancer ([address] ) 2010    approximately   • HL (hearing loss) 2010   • Hypertension     on and off   • Visual impairment 2018    macular degeneration       PAST SURGICAL HISTORY:  Past Surgical History:   Procedure Laterality Date • EAR SURGERY     • EYE SURGERY      cataracts   • HIP SURGERY     • JOINT REPLACEMENT  ?    knee & hip   • KNEE SURGERY         FAMILY HISTORY:  History reviewed  No pertinent family history  SOCIAL HISTORY:  Social History     Socioeconomic History   • Marital status:      Spouse name: Not on file   • Number of children: Not on file   • Years of education: Not on file   • Highest education level: Not on file   Occupational History   • Not on file   Tobacco Use   • Smoking status: Some Days     Packs/day: 0 25     Years: 20 00     Pack years: 5 00     Types: Cigarettes   • Smokeless tobacco: Never   Vaping Use   • Vaping Use: Never used   Substance and Sexual Activity   • Alcohol use: Yes     Alcohol/week: 4 0 standard drinks     Types: 4 Standard drinks or equivalent per week   • Drug use: Never   • Sexual activity: Not Currently     Birth control/protection: Post-menopausal   Other Topics Concern   • Not on file   Social History Narrative   • Not on file     Social Determinants of Health     Financial Resource Strain: Low Risk    • Difficulty of Paying Living Expenses: Not hard at all   Food Insecurity: Not on file   Transportation Needs: No Transportation Needs   • Lack of Transportation (Medical): No   • Lack of Transportation (Non-Medical): No   Physical Activity: Not on file   Stress: Not on file   Social Connections: Not on file   Intimate Partner Violence: Not on file   Housing Stability: Not on file       Review of Systems   Musculoskeletal: Positive for arthralgias and gait problem  Skin: Positive for color change and rash  All other systems reviewed and are negative  Objective:  Vitals:    01/16/23 1449   BP: 118/72   BP Location: Right arm   Patient Position: Sitting   Cuff Size: Adult   Pulse: 78   Resp: 18   Temp: 98 4 °F (36 9 °C)   TempSrc: Tympanic   SpO2: 95%   Weight: 78 9 kg (174 lb)   Height: 5' 2" (1 575 m)     Body mass index is 31 83 kg/m²       Physical Exam  Vitals and nursing note reviewed  Constitutional:       Appearance: Normal appearance  HENT:      Head: Normocephalic and atraumatic  Cardiovascular:      Rate and Rhythm: Normal rate and regular rhythm  Heart sounds: Normal heart sounds  Pulmonary:      Effort: Pulmonary effort is normal       Breath sounds: Normal breath sounds  Musculoskeletal:         General: Normal range of motion  Cervical back: Normal range of motion  Skin:     General: Skin is warm  Neurological:      General: No focal deficit present  Mental Status: She is alert  Psychiatric:         Mood and Affect: Mood normal            RESULTS:    No results found for this or any previous visit (from the past 1008 hour(s))  This note was created with voice recognition software  Phonic, grammatical and spelling errors may be present within the note as a result

## 2023-02-07 ENCOUNTER — TELEPHONE (OUTPATIENT)
Dept: INTERNAL MEDICINE CLINIC | Facility: CLINIC | Age: 88
End: 2023-02-07

## 2023-02-07 DIAGNOSIS — Z74.09 IMPAIRED FUNCTIONAL MOBILITY, BALANCE, GAIT, AND ENDURANCE: Primary | ICD-10-CM

## 2023-02-07 NOTE — TELEPHONE ENCOUNTER
Physical therapy is asking for another order for Gait & Balance pt  They would also like patient's med list to be sent with that please      Fax# [phone]

## 2023-04-20 ENCOUNTER — APPOINTMENT (OUTPATIENT)
Dept: LAB | Facility: CLINIC | Age: 88
End: 2023-04-20

## 2023-04-20 ENCOUNTER — APPOINTMENT (OUTPATIENT)
Dept: RADIOLOGY | Facility: CLINIC | Age: 88
End: 2023-04-20

## 2023-04-20 DIAGNOSIS — I10 HYPERTENSION: ICD-10-CM

## 2023-04-20 DIAGNOSIS — R79.9 ABNORMAL FINDING OF BLOOD CHEMISTRY, UNSPECIFIED: ICD-10-CM

## 2023-04-20 DIAGNOSIS — M54.50 CHRONIC LEFT-SIDED LOW BACK PAIN WITHOUT SCIATICA: ICD-10-CM

## 2023-04-20 DIAGNOSIS — G89.29 CHRONIC LEFT-SIDED LOW BACK PAIN WITHOUT SCIATICA: ICD-10-CM

## 2023-04-20 DIAGNOSIS — I10 PRIMARY HYPERTENSION: ICD-10-CM

## 2023-04-20 LAB
ALBUMIN SERPL BCP-MCNC: 3.5 G/DL (ref 3.5–5)
ALP SERPL-CCNC: 76 U/L (ref 46–116)
ALT SERPL W P-5'-P-CCNC: 13 U/L (ref 12–78)
ANION GAP SERPL CALCULATED.3IONS-SCNC: 2 MMOL/L (ref 4–13)
AST SERPL W P-5'-P-CCNC: 25 U/L (ref 5–45)
BASOPHILS # BLD AUTO: 0.03 THOUSANDS/ΜL (ref 0–0.1)
BASOPHILS NFR BLD AUTO: 0 % (ref 0–1)
BILIRUB SERPL-MCNC: 0.59 MG/DL (ref 0.2–1)
BUN SERPL-MCNC: 16 MG/DL (ref 5–25)
CALCIUM SERPL-MCNC: 9.2 MG/DL (ref 8.3–10.1)
CHLORIDE SERPL-SCNC: 115 MMOL/L (ref 96–108)
CHOLEST SERPL-MCNC: 179 MG/DL
CO2 SERPL-SCNC: 24 MMOL/L (ref 21–32)
CREAT SERPL-MCNC: 1.24 MG/DL (ref 0.6–1.3)
EOSINOPHIL # BLD AUTO: 0.17 THOUSAND/ΜL (ref 0–0.61)
EOSINOPHIL NFR BLD AUTO: 2 % (ref 0–6)
ERYTHROCYTE [DISTWIDTH] IN BLOOD BY AUTOMATED COUNT: 15.3 % (ref 11.6–15.1)
GFR SERPL CREATININE-BSD FRML MDRD: 37 ML/MIN/1.73SQ M
GLUCOSE P FAST SERPL-MCNC: 95 MG/DL (ref 65–99)
HCT VFR BLD AUTO: 41.7 % (ref 34.8–46.1)
HDLC SERPL-MCNC: 45 MG/DL
HGB BLD-MCNC: 13.4 G/DL (ref 11.5–15.4)
IMM GRANULOCYTES # BLD AUTO: 0.03 THOUSAND/UL (ref 0–0.2)
IMM GRANULOCYTES NFR BLD AUTO: 0 % (ref 0–2)
LDLC SERPL CALC-MCNC: 89 MG/DL (ref 0–100)
LYMPHOCYTES # BLD AUTO: 1.84 THOUSANDS/ΜL (ref 0.6–4.47)
LYMPHOCYTES NFR BLD AUTO: 21 % (ref 14–44)
MCH RBC QN AUTO: 29.5 PG (ref 26.8–34.3)
MCHC RBC AUTO-ENTMCNC: 32.1 G/DL (ref 31.4–37.4)
MCV RBC AUTO: 92 FL (ref 82–98)
MONOCYTES # BLD AUTO: 0.5 THOUSAND/ΜL (ref 0.17–1.22)
MONOCYTES NFR BLD AUTO: 6 % (ref 4–12)
NEUTROPHILS # BLD AUTO: 6.39 THOUSANDS/ΜL (ref 1.85–7.62)
NEUTS SEG NFR BLD AUTO: 71 % (ref 43–75)
NRBC BLD AUTO-RTO: 0 /100 WBCS
PLATELET # BLD AUTO: 334 THOUSANDS/UL (ref 149–390)
PMV BLD AUTO: 9.1 FL (ref 8.9–12.7)
POTASSIUM SERPL-SCNC: 4.3 MMOL/L (ref 3.5–5.3)
PROT SERPL-MCNC: 7.1 G/DL (ref 6.4–8.4)
RBC # BLD AUTO: 4.55 MILLION/UL (ref 3.81–5.12)
SODIUM SERPL-SCNC: 141 MMOL/L (ref 135–147)
TRIGL SERPL-MCNC: 225 MG/DL
TSH SERPL DL<=0.05 MIU/L-ACNC: 2.68 UIU/ML (ref 0.45–4.5)
WBC # BLD AUTO: 8.96 THOUSAND/UL (ref 4.31–10.16)

## 2023-04-21 LAB
EST. AVERAGE GLUCOSE BLD GHB EST-MCNC: 91 MG/DL
HBA1C MFR BLD: 4.8 %

## 2023-04-25 ENCOUNTER — OFFICE VISIT (OUTPATIENT)
Dept: OBGYN CLINIC | Facility: CLINIC | Age: 88
End: 2023-04-25

## 2023-04-25 ENCOUNTER — TELEPHONE (OUTPATIENT)
Dept: OBGYN CLINIC | Facility: CLINIC | Age: 88
End: 2023-04-25

## 2023-04-25 VITALS
BODY MASS INDEX: 32.02 KG/M2 | SYSTOLIC BLOOD PRESSURE: 160 MMHG | HEIGHT: 62 IN | HEART RATE: 67 BPM | WEIGHT: 174 LBS | DIASTOLIC BLOOD PRESSURE: 81 MMHG

## 2023-04-25 DIAGNOSIS — M17.12 PRIMARY OSTEOARTHRITIS OF LEFT KNEE: ICD-10-CM

## 2023-04-25 DIAGNOSIS — M54.50 CHRONIC LEFT-SIDED LOW BACK PAIN WITHOUT SCIATICA: ICD-10-CM

## 2023-04-25 DIAGNOSIS — M70.72 ISCHIAL BURSITIS OF LEFT SIDE: Primary | ICD-10-CM

## 2023-04-25 DIAGNOSIS — G89.29 CHRONIC LEFT-SIDED LOW BACK PAIN WITHOUT SCIATICA: ICD-10-CM

## 2023-04-25 DIAGNOSIS — S76.312A HAMSTRING STRAIN, LEFT, INITIAL ENCOUNTER: ICD-10-CM

## 2023-04-25 RX ORDER — PREDNISONE 20 MG/1
20 TABLET ORAL 2 TIMES DAILY WITH MEALS
Qty: 10 TABLET | Refills: 0 | Status: SHIPPED | OUTPATIENT
Start: 2023-04-25 | End: 2023-05-01

## 2023-04-25 RX ADMIN — METHYLPREDNISOLONE ACETATE 2 ML: 40 INJECTION, SUSPENSION INTRA-ARTICULAR; INTRALESIONAL; INTRAMUSCULAR; SOFT TISSUE at 16:05

## 2023-04-25 RX ADMIN — BUPIVACAINE HYDROCHLORIDE 4 ML: 2.5 INJECTION, SOLUTION INFILTRATION; PERINEURAL at 16:05

## 2023-04-25 RX ADMIN — BUPIVACAINE HYDROCHLORIDE 1 ML: 2.5 INJECTION, SOLUTION INFILTRATION; PERINEURAL at 16:05

## 2023-04-25 NOTE — PROGRESS NOTES
Assessment/Plan:  Assessment/Plan   Diagnoses and all orders for this visit:    Ischial bursitis of left side  -     predniSONE 20 mg tablet; Take 1 tablet (20 mg total) by mouth 2 (two) times a day with meals for 5 days  -     Cancel: Ambulatory Referral to Pain Management; Future    Hamstring strain, left, initial encounter    Primary osteoarthritis of left knee  -     Large joint arthrocentesis: L knee      80year-old female with left buttock pain more than 3 months duration  Discussed with patient and accompanying daughter physical exam, radiographs, impression, and plan  X-rays noted for left hip arthroplasty, arthritis of the right hip, degenerative changes lumbar spine  Physical exam noted for tenderness at the ischial tuberosity on the left  There is no groin pain with EBONY and FADDIR of the hip  There is reproduction of pain with attempt of hamstring stretch  Clinical pression is she may have symptoms from ischial bursitis/hamstring strain  I discussed treatment regimen of anti-inflammatory and steroid injection  She is to take prednisone 20 mg twice daily with food for 5 days  I will refer her to my sports colleague for evaluation and consideration of ultrasound-guided injection to the left ischial tuberosity  She is also has worsening of arthritis so I offered repeat steroid injection to which she agreed  I administered mixture of 3 cc 0 25% bupivacaine and 2 cc Depo-Medrol to the left knee without complication  She will follow-up as needed  Subjective:   Patient ID: Tere Campa is a 80 y o  female  Chief Complaint   Patient presents with   • Left Hip - Pain       80year-old female presents for evaluation of left buttock pain of more than 3 months duration  She denies trauma or inciting event    Pain described as gradual in the onset, localized to the left buttock, radiating distally along the posterior aspect of the thigh to the level of the knee, worse with prolonged sitting, and "improved with changing position/shifting weight to her right side  She manages symptoms with taking Tylenol  Symptoms have been gradually worsening  She also reports worsening of left knee pain  She received left knee steroid injection August 2022  Hip Pain  This is a new problem  The current episode started more than 1 month ago  The problem occurs daily  The problem has been gradually improving  Associated symptoms include arthralgias and joint swelling  Pertinent negatives include no numbness or weakness  Exacerbated by: Prolonged sitting  She has tried rest, position changes and acetaminophen for the symptoms  The treatment provided mild relief  The following portions of the patient's history were reviewed and updated as appropriate: She  has a past medical history of Arthritis (2000), Breast cancer ([facility] [address] ), Cancer ([facility] [address] ) (2010), HL (hearing loss) (2010), Hypertension, and Visual impairment (2018)  She has No Known Allergies       Review of Systems   Musculoskeletal: Positive for arthralgias and joint swelling  Neurological: Negative for weakness and numbness  Objective:  Vitals:    04/25/23 1449   BP: 160/81   Pulse: 67   Weight: 78 9 kg (174 lb)   Height: 5' 2\" (1 575 m)     Left Knee Exam     Tenderness   The patient is experiencing tenderness in the lateral joint line and medial joint line  Range of Motion   Extension: -5     Other   Swelling: mild    Comments:  Genu valgum      Left Hip Exam     Tenderness   The patient is experiencing tenderness in the ischial tuberosity  Muscle Strength   Abduction: 4/5   Flexion: 4/5     Tests   EBONY: negative    Comments:  Negative FADDIR            Physical Exam  Vitals and nursing note reviewed  Exam conducted with a chaperone present  Constitutional:       General: She is not in acute distress  Appearance: She is well-developed  She is not ill-appearing or diaphoretic  HENT:      Head: Normocephalic and atraumatic        Right " "Ear: External ear normal       Left Ear: External ear normal    Eyes:      Conjunctiva/sclera: Conjunctivae normal    Neck:      Trachea: No tracheal deviation  Cardiovascular:      Rate and Rhythm: Normal rate  Pulmonary:      Effort: Pulmonary effort is normal  No respiratory distress  Abdominal:      General: There is no distension  Musculoskeletal:         General: Tenderness present  No swelling, deformity or signs of injury  Skin:     General: Skin is warm and dry  Coloration: Skin is not jaundiced or pale  Neurological:      Mental Status: She is alert and oriented to person, place, and time  Psychiatric:         Mood and Affect: Mood normal          Behavior: Behavior normal          Thought Content: Thought content normal          Judgment: Judgment normal          I have personally reviewed pertinent films in PACS and my interpretation is Left hip total arthroplasty  Arthritis of the right hip  Lower lumbar spine degenerative changes  Large joint arthrocentesis: L knee  Universal Protocol:  Consent: Verbal consent obtained  Risks and benefits: risks, benefits and alternatives were discussed  Consent given by: patient (Daughter)  Time out: Immediately prior to procedure a \"time out\" was called to verify the correct patient, procedure, equipment, support staff and site/side marked as required  Patient understanding: patient states understanding of the procedure being performed  Patient consent: the patient's understanding of the procedure matches consent given  Procedure consent: procedure consent matches procedure scheduled  Relevant documents: relevant documents present and verified  Test results: test results available and properly labeled  Site marked: the operative site was marked  Radiology Images displayed and confirmed   If images not available, report reviewed: imaging studies available  Required items: required blood products, implants, devices, and special equipment " "available  Patient identity confirmed: verbally with patient    Supporting Documentation  Indications: pain and joint swelling   Procedure Details  Location: knee - L knee  Preparation: Patient was prepped and draped in the usual sterile fashion  Needle gauge: 21G 2\"  Ultrasound guidance: no  Approach: anterolateral  Medications administered: 4 mL bupivacaine 0 25 %; 1 mL bupivacaine 0 25 %; 2 mL methylPREDNISolone acetate 40 mg/mL    Patient tolerance: patient tolerated the procedure well with no immediate complications  Dressing:  Sterile dressing applied          "

## 2023-04-25 NOTE — H&P (VIEW-ONLY)
Assessment/Plan:  Assessment/Plan   Diagnoses and all orders for this visit:    Ischial bursitis of left side  -     predniSONE 20 mg tablet; Take 1 tablet (20 mg total) by mouth 2 (two) times a day with meals for 5 days  -     Cancel: Ambulatory Referral to Pain Management; Future    Hamstring strain, left, initial encounter    Primary osteoarthritis of left knee  -     Large joint arthrocentesis: L knee      80year-old female with left buttock pain more than 3 months duration  Discussed with patient and accompanying daughter physical exam, radiographs, impression, and plan  X-rays noted for left hip arthroplasty, arthritis of the right hip, degenerative changes lumbar spine  Physical exam noted for tenderness at the ischial tuberosity on the left  There is no groin pain with EBONY and FADDIR of the hip  There is reproduction of pain with attempt of hamstring stretch  Clinical pression is she may have symptoms from ischial bursitis/hamstring strain  I discussed treatment regimen of anti-inflammatory and steroid injection  She is to take prednisone 20 mg twice daily with food for 5 days  I will refer her to my sports colleague for evaluation and consideration of ultrasound-guided injection to the left ischial tuberosity  She is also has worsening of arthritis so I offered repeat steroid injection to which she agreed  I administered mixture of 3 cc 0 25% bupivacaine and 2 cc Depo-Medrol to the left knee without complication  She will follow-up as needed  Subjective:   Patient ID: Colleen Mejía is a 80 y o  female  Chief Complaint   Patient presents with   • Left Hip - Pain       80year-old female presents for evaluation of left buttock pain of more than 3 months duration  She denies trauma or inciting event    Pain described as gradual in the onset, localized to the left buttock, radiating distally along the posterior aspect of the thigh to the level of the knee, worse with prolonged sitting, and "improved with changing position/shifting weight to her right side  She manages symptoms with taking Tylenol  Symptoms have been gradually worsening  She also reports worsening of left knee pain  She received left knee steroid injection August 2022  Hip Pain  This is a new problem  The current episode started more than 1 month ago  The problem occurs daily  The problem has been gradually improving  Associated symptoms include arthralgias and joint swelling  Pertinent negatives include no numbness or weakness  Exacerbated by: Prolonged sitting  She has tried rest, position changes and acetaminophen for the symptoms  The treatment provided mild relief  The following portions of the patient's history were reviewed and updated as appropriate: She  has a past medical history of Arthritis (2000), Breast cancer ([facility] [address] Cancer ([facility] [address] ) (2010), HL (hearing loss) (2010), Hypertension, and Visual impairment (2018)  She has No Known Allergies       Review of Systems   Musculoskeletal: Positive for arthralgias and joint swelling  Neurological: Negative for weakness and numbness  Objective:  Vitals:    04/25/23 1449   BP: 160/81   Pulse: 67   Weight: 78 9 kg (174 lb)   Height: 5' 2\" (1 575 m)     Left Knee Exam     Tenderness   The patient is experiencing tenderness in the lateral joint line and medial joint line  Range of Motion   Extension: -5     Other   Swelling: mild    Comments:  Genu valgum      Left Hip Exam     Tenderness   The patient is experiencing tenderness in the ischial tuberosity  Muscle Strength   Abduction: 4/5   Flexion: 4/5     Tests   EBONY: negative    Comments:  Negative FADDIR            Physical Exam  Vitals and nursing note reviewed  Exam conducted with a chaperone present  Constitutional:       General: She is not in acute distress  Appearance: She is well-developed  She is not ill-appearing or diaphoretic  HENT:      Head: Normocephalic and atraumatic        Right " "Ear: External ear normal       Left Ear: External ear normal    Eyes:      Conjunctiva/sclera: Conjunctivae normal    Neck:      Trachea: No tracheal deviation  Cardiovascular:      Rate and Rhythm: Normal rate  Pulmonary:      Effort: Pulmonary effort is normal  No respiratory distress  Abdominal:      General: There is no distension  Musculoskeletal:         General: Tenderness present  No swelling, deformity or signs of injury  Skin:     General: Skin is warm and dry  Coloration: Skin is not jaundiced or pale  Neurological:      Mental Status: She is alert and oriented to person, place, and time  Psychiatric:         Mood and Affect: Mood normal          Behavior: Behavior normal          Thought Content: Thought content normal          Judgment: Judgment normal          I have personally reviewed pertinent films in PACS and my interpretation is Left hip total arthroplasty  Arthritis of the right hip  Lower lumbar spine degenerative changes  Large joint arthrocentesis: L knee  Universal Protocol:  Consent: Verbal consent obtained  Risks and benefits: risks, benefits and alternatives were discussed  Consent given by: patient (Daughter)  Time out: Immediately prior to procedure a \"time out\" was called to verify the correct patient, procedure, equipment, support staff and site/side marked as required  Patient understanding: patient states understanding of the procedure being performed  Patient consent: the patient's understanding of the procedure matches consent given  Procedure consent: procedure consent matches procedure scheduled  Relevant documents: relevant documents present and verified  Test results: test results available and properly labeled  Site marked: the operative site was marked  Radiology Images displayed and confirmed   If images not available, report reviewed: imaging studies available  Required items: required blood products, implants, devices, and special equipment " "available  Patient identity confirmed: verbally with patient    Supporting Documentation  Indications: pain and joint swelling   Procedure Details  Location: knee - L knee  Preparation: Patient was prepped and draped in the usual sterile fashion  Needle gauge: 21G 2\"  Ultrasound guidance: no  Approach: anterolateral  Medications administered: 4 mL bupivacaine 0 25 %; 1 mL bupivacaine 0 25 %; 2 mL methylPREDNISolone acetate 40 mg/mL    Patient tolerance: patient tolerated the procedure well with no immediate complications  Dressing:  Sterile dressing applied          "

## 2023-04-26 RX ORDER — BUPIVACAINE HYDROCHLORIDE 2.5 MG/ML
4 INJECTION, SOLUTION INFILTRATION; PERINEURAL
Status: COMPLETED | OUTPATIENT
Start: 2023-04-25 | End: 2023-04-25

## 2023-04-26 RX ORDER — METHYLPREDNISOLONE ACETATE 40 MG/ML
2 INJECTION, SUSPENSION INTRA-ARTICULAR; INTRALESIONAL; INTRAMUSCULAR; SOFT TISSUE
Status: COMPLETED | OUTPATIENT
Start: 2023-04-25 | End: 2023-04-25

## 2023-04-26 RX ORDER — BUPIVACAINE HYDROCHLORIDE 2.5 MG/ML
1 INJECTION, SOLUTION INFILTRATION; PERINEURAL
Status: COMPLETED | OUTPATIENT
Start: 2023-04-25 | End: 2023-04-25

## 2023-05-01 ENCOUNTER — TELEPHONE (OUTPATIENT)
Dept: PAIN MEDICINE | Facility: CLINIC | Age: 88
End: 2023-05-01

## 2023-05-01 DIAGNOSIS — M70.72 ISCHIAL BURSITIS OF LEFT SIDE: Primary | ICD-10-CM

## 2023-05-01 NOTE — PROGRESS NOTES
Patient was referred for left ischial bursa injection  Unfortunately I do not perform this procedure  Referral was placed for pain management for procedure visit

## 2023-05-01 NOTE — TELEPHONE ENCOUNTER
Caller: [facility] taker    Doctor: Dr Dev Castro     Reason for call: Schedule an injection on a referral for Ortho    Call back#: [phone]

## 2023-05-02 NOTE — TELEPHONE ENCOUNTER
S/w pt's caregiver Maciej Payer and scheduled left ischial bursa injection for 5/3/23 1015 am arrival  Gave pre procedure instructions and  policy

## 2023-05-02 NOTE — TELEPHONE ENCOUNTER
Caller: el Hernandez [facility]    Doctor: keo    Reason for call: schedule a procedure, per ref    Call back#: [phone]

## 2023-05-02 NOTE — TELEPHONE ENCOUNTER
Ok to schedule as long as she goes back to Dr Jaylyn Robles for a follow up 4 weeks after injection  She is not establishing with us so she will need to follow up with Dr Jaylyn Robles

## 2023-05-03 ENCOUNTER — HOSPITAL ENCOUNTER (OUTPATIENT)
Dept: RADIOLOGY | Facility: CLINIC | Age: 88
Discharge: HOME | End: 2023-05-03
Admitting: ANESTHESIOLOGY

## 2023-05-03 VITALS
SYSTOLIC BLOOD PRESSURE: 131 MMHG | RESPIRATION RATE: 18 BRPM | OXYGEN SATURATION: 98 % | HEART RATE: 75 BPM | TEMPERATURE: 96.8 F | DIASTOLIC BLOOD PRESSURE: 74 MMHG

## 2023-05-03 DIAGNOSIS — M70.72 ISCHIAL BURSITIS OF LEFT SIDE: ICD-10-CM

## 2023-05-03 RX ORDER — LIDOCAINE HYDROCHLORIDE 10 MG/ML
5 INJECTION, SOLUTION EPIDURAL; INFILTRATION; INTRACAUDAL; PERINEURAL ONCE
Status: DISCONTINUED | OUTPATIENT
Start: 2023-05-03 | End: 2023-05-07 | Stop reason: HOSPADM

## 2023-05-03 RX ORDER — METHYLPREDNISOLONE ACETATE 40 MG/ML
40 INJECTION, SUSPENSION INTRA-ARTICULAR; INTRALESIONAL; INTRAMUSCULAR; PARENTERAL; SOFT TISSUE ONCE
Status: COMPLETED | OUTPATIENT
Start: 2023-05-03 | End: 2023-05-03

## 2023-05-03 RX ORDER — BUPIVACAINE HCL/PF 2.5 MG/ML
5 VIAL (ML) INJECTION ONCE
Status: COMPLETED | OUTPATIENT
Start: 2023-05-03 | End: 2023-05-03

## 2023-05-03 RX ADMIN — Medication 4 ML: at 10:16

## 2023-05-03 RX ADMIN — METHYLPREDNISOLONE ACETATE 40 MG: 40 INJECTION, SUSPENSION INTRA-ARTICULAR; INTRALESIONAL; INTRAMUSCULAR; PARENTERAL; SOFT TISSUE at 10:16

## 2023-05-03 NOTE — INTERVAL H&P NOTE
Update: (This section must be completed if the H&P was completed greater than 24 hrs to procedure or admission)    H&P reviewed  After examining the patient, I find no changed to the H&P since it had been written  Patient re-evaluated   Accept as history and physical     Kaveh Gavin MD/May 3, 2023/10:01 AM

## 2023-05-03 NOTE — DISCHARGE INSTR - LAB
Do not apply heat to any area that is numb  If you have discomfort or soreness at the injection site, you may apply ice today, 20 minutes on and 20 minutes off  Tomorrow you may use ice or warm, moist heat  Do not apply ice or heat directly to the skin  If you experience severe shortness of breath, go to the Emergency Room  You may have numbness for several hours from the local anesthetic  Please use caution and common sense, especially with weight-bearing activities  You may have an increase or change in the discomfort for 36-48 hours after your treatment  Apply ice and continue with any pain medicine you have been prescribed  Do not do anything strenuous today  You may shower, but no tub baths or hot tubs today  You may resume your normal activities tomorrow, but do not “overdo it”  Resume normal activities slowly when you are feeling better  If you experience redness, drainage or swelling at the injection site, or if you develop a fever above 100 degrees, please call [facility] at [phone] or go to the Emergency Room  Continue to take all routine medicines prescribed by your primary care physician unless otherwise instructed by our staff  Most blood thinners should be started again according to your regularly scheduled dosing  If you have any questions, please give our office a call  As no general anesthesia was used in today's procedure, you should not experience any side effects related to anesthesia  If you have a problem specifically related to your procedure, please call our office at [phone]  Problems not related to your procedure should be directed to your primary care physician

## 2023-05-11 ENCOUNTER — TELEPHONE (OUTPATIENT)
Dept: INTERNAL MEDICINE CLINIC | Facility: CLINIC | Age: 88
End: 2023-05-11

## 2023-05-11 DIAGNOSIS — G89.29 CHRONIC LEFT-SIDED LOW BACK PAIN WITHOUT SCIATICA: Primary | ICD-10-CM

## 2023-05-11 DIAGNOSIS — M54.50 CHRONIC LEFT-SIDED LOW BACK PAIN WITHOUT SCIATICA: Primary | ICD-10-CM

## 2023-05-11 RX ORDER — CYCLOBENZAPRINE HCL 5 MG
5 TABLET ORAL 3 TIMES DAILY PRN
Qty: 70 TABLET | Refills: 0 | Status: SHIPPED | OUTPATIENT
Start: 2023-05-11 | End: 2023-07-11

## 2023-05-11 RX ORDER — GABAPENTIN 100 MG/1
100 CAPSULE ORAL
Qty: 30 CAPSULE | Refills: 0 | Status: SHIPPED | OUTPATIENT
Start: 2023-05-11 | End: 2023-07-11

## 2023-05-11 RX ORDER — METHYLPREDNISOLONE 4 MG/1
TABLET ORAL
Qty: 21 EACH | Refills: 0 | Status: SHIPPED | OUTPATIENT
Start: 2023-05-11 | End: 2023-07-11

## 2023-05-11 NOTE — TELEPHONE ENCOUNTER
Pt complains of pain     shes seen specialists for but nothing seems to be working as per her daughter      Ankle knee and si joint, can they have something more than tylenol for pain they ask?

## 2023-05-11 NOTE — TELEPHONE ENCOUNTER
"As per Dr Elva Campbell, \"Its verry difficult given her age; tylenol 1000 mg around the clock; sent a muscle relaxer; Motrin and other NSAID's can affect her kidney function and worsen HTN  I sent a medrol dose pack to help with quick relief; can try taking gabapentin at bedtime to see if that helps; will send that as well\"    Spoke with the patient's daughter and advised her of this  She verbalized understanding and will  the rxs    "

## 2023-05-18 ENCOUNTER — TELEPHONE (OUTPATIENT)
Dept: PAIN MEDICINE | Facility: MEDICAL CENTER | Age: 88
End: 2023-05-18

## 2023-05-18 ENCOUNTER — TELEPHONE (OUTPATIENT)
Dept: OBGYN CLINIC | Facility: HOSPITAL | Age: 88
End: 2023-05-18

## 2023-05-18 ENCOUNTER — TELEPHONE (OUTPATIENT)
Dept: INTERNAL MEDICINE CLINIC | Facility: CLINIC | Age: 88
End: 2023-05-18

## 2023-05-18 NOTE — TELEPHONE ENCOUNTER
S/W daughter  Pt received an injection in our office per order from ortho  She states her mom is in so much pain she just wants to die  Meds ordered by PCP did not help, she is taking tylenol round the clock  Was not able to start the gabapentin as they have not received it yet    I did explain it would be best to call ortho to discuss further orders for pain relief  Otherwise she would need to schedule a new pt appointment in our office   Daughter verbalized understanding and was appreciative of my help

## 2023-05-18 NOTE — TELEPHONE ENCOUNTER
Patient's daughter called and said that her mom called her this morning and was crying due to being in such pain  Patient said that she can't even get out of bed  Daughter is asking if there is something you can give her for the pain  Daughter said that they are just trying to keep mom comfortable at her age and not worried about her getting addicted to anything  Please advise    Letitia Stern

## 2023-05-18 NOTE — TELEPHONE ENCOUNTER
Caller: daughter and Erin Justina    Doctor: keo    Reason for call: Needs pain medicine for pain control  The patient is having pain in her left buttocks  Pain level 10/10      Call back#: [phone]

## 2023-05-18 NOTE — TELEPHONE ENCOUNTER
Patient was referred to pain management for injection consideration  I have not evaluated patient for this issue

## 2023-05-18 NOTE — TELEPHONE ENCOUNTER
Caller:daughter     Doctor: Shankar Ott     Reason for call: Daughter states Kenyon is in a lot of pain and is asking if we can send some medication in for her   Daughter states morphine does not work well with her      [facility] [place], [place] - [facility], [place] [address] [facility]   Phone:  [phone]  Fax:  [phone]   MARÍA ELENA #:  --    Call back#: [phone] Gloria valera

## 2023-05-18 NOTE — TELEPHONE ENCOUNTER
Called and spoke w/daughter and relayed Dr Johnathon Tolentino and reviewed chart  It appears that pt has been seeing Dr Kim Gross 4/23/23 on 4/25 had CSI to left knee and was then referred to Dr Simon Marte and Jarrell Anderson but they do not do ischial tuberosity so referral was placed for Pain Management on 5/1/23  Pt had injection und Fl w/spine and pain on 5/3/23 on ischial bursititis left side which has not worked  Pain in left buttocks is worse and pt is telling daughter that she just wants to die  She has not been seen for pain management as they only saw her for injection  Dr Simon Marte,  Please advise on who to refer this pt too for pain medication as she is in severe pain of her left buttocks? Pt is using tylenol, ibuprofen and it is not helping  PCP would not prescribe anything  Pt cannot get appt to see pain management for 1 week  This hard to follow as she has been sent to different physicians  Please advise

## 2023-05-18 NOTE — TELEPHONE ENCOUNTER
Spoke with Xiao Brooks and she does feel the ER is necessary because they will only send her home and the pain will continue  Xiao Brooks is going to follow up with pain management since the pt is established with them

## 2023-05-19 ENCOUNTER — OFFICE VISIT (OUTPATIENT)
Dept: OBGYN CLINIC | Facility: CLINIC | Age: 88
End: 2023-05-19

## 2023-05-19 VITALS
SYSTOLIC BLOOD PRESSURE: 134 MMHG | WEIGHT: 174 LBS | HEART RATE: 91 BPM | BODY MASS INDEX: 32.02 KG/M2 | DIASTOLIC BLOOD PRESSURE: 72 MMHG | HEIGHT: 62 IN

## 2023-05-19 DIAGNOSIS — M17.12 PRIMARY OSTEOARTHRITIS OF LEFT KNEE: ICD-10-CM

## 2023-05-19 DIAGNOSIS — M25.552 PAIN IN LEFT HIP: Primary | ICD-10-CM

## 2023-05-19 RX ORDER — MELOXICAM 15 MG/1
15 TABLET ORAL DAILY
Qty: 30 TABLET | Refills: 1 | Status: SHIPPED | OUTPATIENT
Start: 2023-05-19

## 2023-05-19 NOTE — TELEPHONE ENCOUNTER
Pt was referred for ischial bursa injection however appt was cancelled as I do not peform this procedure  Would recommend patient follow up with evaluating provider for further recommendation on management for pain

## 2023-05-19 NOTE — TELEPHONE ENCOUNTER
Reviewed Dr Jayleen Damon note  Pt has seen Dr Radha Edmond in the past   Called and spoke w/daughter, Ramone Dean and she would like an appt w/Dr aRdha Edmond today as pt is continuing to c/o severe left buttocks pain and feels she cannot continue to live this way  (Please refer to previous notes)   Hello,  Please advise if the following patient can be forced onto the schedule:    Patient: Ivan Hargrove    : 1929    MRN: [id number]    Call back #: [address]: Medicare    Reason for appointment: severe left buttocks pain    Requested doctor/location: Dr Boswell/[facility]      Thank you

## 2023-05-19 NOTE — PROGRESS NOTES
Assessment/Plan:  Assessment/Plan   Diagnoses and all orders for this visit:    Pain in left hip  -     MRI hip left wo contrast; Future    Primary osteoarthritis of left knee  -     meloxicam (MOBIC) 15 mg tablet; Take 1 tablet (15 mg total) by mouth daily      80year-old female with left posterior hip/buttock pain more than 4 months duration  She did not experience relief of symptoms with ischial bursa injection, as I discussed with patient and accompanying daughter that symptoms may be due to occult osseous abnormality or from pain radiating from other source  She is status post left hip replacement and this may also be cause of symptoms  I will refer for MRI of the left hip to evaluate for stress fracture and movement/loosening of hardware  She will return after having MRI done  Subjective:   Patient ID: Renee Mesa is a 80 y o  female  Chief Complaint   Patient presents with   • Left Hip - Follow-up       80-year-old female following up for left posterior hip/buttock pain more than 4 months duration  She was last seen by me 3 weeks ago at which point she was given a course of oral steroid and referred for left ischial bursa injection  She underwent procedure with pain management about 2 weeks ago  She denies improvement in symptoms  She has pain described as localized to left posterior hip/buttock, and distally on the posterior aspect of thigh to the level of the knee, worse with directly sitting on the left buttock, and improved with changing position/shifting weight to her right side  Tylenol has not been helping her symptoms  Hip Pain  This is a new problem  The current episode started more than 1 month ago  The problem occurs daily  The problem has been unchanged  Associated symptoms include arthralgias  Pertinent negatives include no joint swelling, numbness or weakness  Exacerbated by: Direct pressure   She has tried rest, position changes and acetaminophen (Oral steroid, "injection) for the symptoms  Review of Systems   Musculoskeletal: Positive for arthralgias  Negative for joint swelling  Neurological: Negative for weakness and numbness  Objective:  Vitals:    05/19/23 1411   BP: 134/72   Pulse: 91   Weight: 78 9 kg (174 lb)   Height: 5' 2\" (1 575 m)     Ortho Exam    Physical Exam  Vitals and nursing note reviewed  Constitutional:       General: She is not in acute distress  Appearance: She is well-developed  She is not ill-appearing or diaphoretic  HENT:      Head: Normocephalic and atraumatic  Right Ear: External ear normal       Left Ear: External ear normal    Eyes:      Conjunctiva/sclera: Conjunctivae normal    Neck:      Trachea: No tracheal deviation  Cardiovascular:      Rate and Rhythm: Normal rate  Pulmonary:      Effort: Pulmonary effort is normal  No respiratory distress  Abdominal:      General: There is no distension  Skin:     General: Skin is warm and dry  Coloration: Skin is not jaundiced or pale  Neurological:      Mental Status: She is alert and oriented to person, place, and time  Psychiatric:         Mood and Affect: Mood normal          Behavior: Behavior normal          Thought Content:  Thought content normal          Judgment: Judgment normal                  "

## 2023-05-23 ENCOUNTER — TELEPHONE (OUTPATIENT)
Dept: OBGYN CLINIC | Facility: HOSPITAL | Age: 88
End: 2023-05-23

## 2023-05-23 NOTE — TELEPHONE ENCOUNTER
Caller: Patient's daughter    Doctor: Alonso Kendrick    Reason for call: Patient's daughter calling stating that she is now having pain in her knee and ankle but hip/buttock pain is fine  Looking for guidance      Call back#: [phone]

## 2023-05-31 DIAGNOSIS — M17.12 PRIMARY OSTEOARTHRITIS OF LEFT KNEE: Primary | ICD-10-CM

## 2023-05-31 NOTE — TELEPHONE ENCOUNTER
Caller: Patients daughter    Doctor: Maegan Berger    Reason for call: Pt would like to proceed with gel injections and is asking for the order to be placed  Aware that injection requires insurance authorization first, then we will call to schedule      Call back#: [phone]

## 2023-06-07 ENCOUNTER — TELEPHONE (OUTPATIENT)
Dept: OBGYN CLINIC | Facility: CLINIC | Age: 88
End: 2023-06-07

## 2023-06-07 NOTE — TELEPHONE ENCOUNTER
Called patient to schedule her for the Left Hip MRI   Patient's daughter Bal Laguerre asked if an order could also be placed for the left knee so that both can be done same day  She is asking for a call back

## 2023-06-08 NOTE — TELEPHONE ENCOUNTER
Called Serina Can to let her know that Dr Roberto Stout advised that the MRI of the knee is not needed due to the xray findings   I asked her to call me back if she had further questions and if she would like to proceed with scheduling the MRI for the hip

## 2023-06-19 ENCOUNTER — PROCEDURE VISIT (OUTPATIENT)
Dept: OBGYN CLINIC | Facility: CLINIC | Age: 88
End: 2023-06-19
Payer: MEDICARE

## 2023-06-19 VITALS
HEART RATE: 78 BPM | BODY MASS INDEX: 32.02 KG/M2 | SYSTOLIC BLOOD PRESSURE: 144 MMHG | DIASTOLIC BLOOD PRESSURE: 71 MMHG | WEIGHT: 174 LBS | HEIGHT: 62 IN

## 2023-06-19 DIAGNOSIS — M17.12 PRIMARY OSTEOARTHRITIS OF LEFT KNEE: Primary | ICD-10-CM

## 2023-06-19 PROCEDURE — 20610 DRAIN/INJ JOINT/BURSA W/O US: CPT | Performed by: FAMILY MEDICINE

## 2023-06-19 RX ORDER — BUPIVACAINE HYDROCHLORIDE 2.5 MG/ML
2 INJECTION, SOLUTION INFILTRATION; PERINEURAL
Status: COMPLETED | OUTPATIENT
Start: 2023-06-19 | End: 2023-06-19

## 2023-06-19 RX ORDER — BUPIVACAINE HYDROCHLORIDE 2.5 MG/ML
1 INJECTION, SOLUTION INFILTRATION; PERINEURAL
Status: COMPLETED | OUTPATIENT
Start: 2023-06-19 | End: 2023-06-19

## 2023-06-19 RX ADMIN — BUPIVACAINE HYDROCHLORIDE 1 ML: 2.5 INJECTION, SOLUTION INFILTRATION; PERINEURAL at 11:15

## 2023-06-19 RX ADMIN — BUPIVACAINE HYDROCHLORIDE 2 ML: 2.5 INJECTION, SOLUTION INFILTRATION; PERINEURAL at 11:15

## 2023-06-19 NOTE — PROGRESS NOTES
Assessment/Plan:  Assessment/Plan   Diagnoses and all orders for this visit:    Primary osteoarthritis of left knee  -     Large joint arthrocentesis: L knee        80year-old female with osteoarthritis of the left knee with left knee pain many years duration  Clinical impression is that she has symptoms from degenerative changes  Patient and caretaker agreed to proceed with Visco injection  I administered Synvisc 1 to the left knee without complication  She is allow 3 to 4 weeks before she would notice full effects of visco injection  If visco injection is to be repeated we must wait at least 6 months  I provided them with information regarding genicular nerve block in case visco injection does not work  She will follow-up with me as needed  Subjective:   Patient ID: Phuong Montero is a 80 y o  female  Chief Complaint   Patient presents with   • Left Knee - Pain, Follow-up       66-year-old female with osteoarthritis of the left knee following up for left knee pain many years duration  She was last seen by me in this regard 1 month ago at which point she was prescribed meloxicam   Since then we requested for Visco injection and she presents today for Visco injection of the left knee  Since meloxicam intense pain has started subside, but pain is still bothersome  She has pain that is generalized to the knee, none radiating, worse to be when ambulating, improved resting, fluctuating intensity and rated as 3-5 out of 10  Steroid injection and NSAIDs provide limited improvement so we advised on visco injection  Knee Pain  This is a chronic problem  The current episode started more than 1 year ago  The problem occurs daily  The problem has been waxing and waning  Associated symptoms include arthralgias, joint swelling and weakness  Pertinent negatives include no numbness  The symptoms are aggravated by standing and walking   She has tried rest, position changes and NSAIDs (Steroid injection) for the "symptoms  The treatment provided mild relief  Review of Systems   Musculoskeletal: Positive for arthralgias and joint swelling  Neurological: Positive for weakness  Negative for numbness  Objective:  Vitals:    06/19/23 1103   BP: 144/71   Pulse: 78   Weight: 78 9 kg (174 lb)   Height: 5' 2\" (1 575 m)     Left Knee Exam     Other   Swelling: mild            Physical Exam  Vitals and nursing note reviewed  Constitutional:       General: She is not in acute distress  Appearance: She is well-developed  She is not ill-appearing or diaphoretic  HENT:      Head: Normocephalic and atraumatic  Right Ear: External ear normal       Left Ear: External ear normal    Eyes:      Conjunctiva/sclera: Conjunctivae normal    Neck:      Trachea: No tracheal deviation  Cardiovascular:      Rate and Rhythm: Normal rate  Pulmonary:      Effort: Pulmonary effort is normal  No respiratory distress  Abdominal:      General: There is no distension  Musculoskeletal:         General: Swelling present  Skin:     General: Skin is warm and dry  Coloration: Skin is not pale  Neurological:      Mental Status: She is alert and oriented to person, place, and time  Psychiatric:         Mood and Affect: Mood normal          Behavior: Behavior normal          Thought Content: Thought content normal          Judgment: Judgment normal                Large joint arthrocentesis: L knee  Universal Protocol:  Consent: Verbal consent obtained  Risks and benefits: risks, benefits and alternatives were discussed  Consent given by: patient  Time out: Immediately prior to procedure a \"time out\" was called to verify the correct patient, procedure, equipment, support staff and site/side marked as required    Patient understanding: patient states understanding of the procedure being performed  Patient consent: the patient's understanding of the procedure matches consent given  Procedure consent: procedure consent " "matches procedure scheduled  Relevant documents: relevant documents present and verified  Test results: test results available and properly labeled  Site marked: the operative site was marked  Radiology Images displayed and confirmed   If images not available, report reviewed: imaging studies available  Required items: required blood products, implants, devices, and special equipment available  Patient identity confirmed: verbally with patient    Supporting Documentation  Indications: pain   Procedure Details  Location: knee - L knee  Preparation: Patient was prepped and draped in the usual sterile fashion  Needle gauge: 21G 2\"  Ultrasound guidance: no  Approach: anterolateral  Medications administered: 2 mL bupivacaine 0 25 %; 1 mL bupivacaine 0 25 %; 48 mg hylan 48 MG/6ML    Patient tolerance: patient tolerated the procedure well with no immediate complications  Dressing:  Sterile dressing applied          "

## 2023-07-03 ENCOUNTER — TELEPHONE (OUTPATIENT)
Dept: INTERNAL MEDICINE CLINIC | Facility: CLINIC | Age: 88
End: 2023-07-03

## 2023-07-03 NOTE — TELEPHONE ENCOUNTER
Caregiver Padilla Kym called back and stated that it isn't constipation patient is having bowel movements and seems normal.  [phone].

## 2023-07-03 NOTE — TELEPHONE ENCOUNTER
Spoke to patients daughter she said she isn't sure if she is having bowel movements or not she will ask her care giver and call us back

## 2023-07-03 NOTE — TELEPHONE ENCOUNTER
Hilaria Washington MD  [facility] Internal Med Clinical 10 minutes ago (2:06 PM)       [place], is she having a BM; might be constipated.

## 2023-07-03 NOTE — TELEPHONE ENCOUNTER
Patient's caregiver called - patient has been complaining of stomach pain on and off for about a week or so. However, she has only complained to the caregiver and not anyone else. I tried to put her in the schedule on Wed. 7/5 but the caregiver could not bring her that day. I suggested taking her to urgent care if it got worse but she insisted on next week.   I put her in for 7/11 at 10:00am.

## 2023-07-11 ENCOUNTER — OFFICE VISIT (OUTPATIENT)
Dept: INTERNAL MEDICINE CLINIC | Facility: CLINIC | Age: 88
End: 2023-07-11
Payer: MEDICARE

## 2023-07-11 VITALS
SYSTOLIC BLOOD PRESSURE: 98 MMHG | WEIGHT: 174 LBS | HEIGHT: 62 IN | DIASTOLIC BLOOD PRESSURE: 68 MMHG | BODY MASS INDEX: 32.02 KG/M2 | HEART RATE: 83 BPM | OXYGEN SATURATION: 97 % | RESPIRATION RATE: 16 BRPM

## 2023-07-11 DIAGNOSIS — K21.9 GASTROESOPHAGEAL REFLUX DISEASE WITHOUT ESOPHAGITIS: Primary | ICD-10-CM

## 2023-07-11 DIAGNOSIS — Z74.09 IMPAIRED FUNCTIONAL MOBILITY, BALANCE, GAIT, AND ENDURANCE: ICD-10-CM

## 2023-07-11 DIAGNOSIS — I10 PRIMARY HYPERTENSION: ICD-10-CM

## 2023-07-11 PROCEDURE — 99214 OFFICE O/P EST MOD 30 MIN: CPT | Performed by: INTERNAL MEDICINE

## 2023-07-11 RX ORDER — FAMOTIDINE 20 MG/1
20 TABLET, FILM COATED ORAL
Qty: 90 TABLET | Refills: 3 | Status: SHIPPED | OUTPATIENT
Start: 2023-07-11 | End: 2024-07-05

## 2023-07-11 RX ORDER — OMEPRAZOLE 20 MG/1
20 CAPSULE, DELAYED RELEASE ORAL
Qty: 30 CAPSULE | Refills: 5 | Status: SHIPPED | OUTPATIENT
Start: 2023-07-11 | End: 2024-01-07

## 2023-07-11 NOTE — PROGRESS NOTES
Assessment/Plan:     Patient is here for with complaints of epigastric pain for at least a month, happens at night when she lays down, feels like a gnawing pain that radiates up her esophagus. Happens 1/2-hour after she eats. Symptoms are typical of reflux. We will start Prilosec in the morning and until that kicks in we will give her Pepcid at night. This for couple weeks. Blood pressure is low, take blood pressure before administering medication. If consistently low, we will stop her HTN med. Quality Measures:     BMI Counseling: There is no height or weight on file to calculate BMI. The BMI is above normal. Nutrition recommendations include decreasing portion sizes and encouraging healthy choices of fruits and vegetables. Exercise recommendations include moderate physical activity 150 minutes/week. Rationale for BMI follow-up plan is due to patient being overweight or obese. Depression Screening and Follow-up Plan: Clincally patient does not have depression. No treatment is required. Tobacco Cessation Counseling: Tobacco cessation counseling was provided. The patient is sincerely urged to quit consumption of tobacco. She is not ready to quit tobacco.          Return for Next scheduled follow up. No problem-specific Assessment & Plan notes found for this encounter. Diagnoses and all orders for this visit:    Gastroesophageal reflux disease without esophagitis  -     omeprazole (PriLOSEC) 20 mg delayed release capsule; Take 1 capsule (20 mg total) by mouth daily before breakfast  -     famotidine (PEPCID) 20 mg tablet; Take 1 tablet (20 mg total) by mouth daily at bedtime    Primary hypertension    Impaired functional mobility, balance, gait, and endurance          Subjective:      Patient ID: Ivy Yang is a 80 y.o. female. Here with acute complaints.       ALLERGIES:  No Known Allergies    CURRENT MEDICATIONS:    Current Outpatient Medications:   •  acetaminophen (TYLENOL) 500 mg tablet, Take 1 tablet (500 mg total) by mouth every 6 (six) hours as needed for mild pain, Disp: 30 tablet, Rfl: 0  •  enalapril (VASOTEC) 10 mg tablet, Take 1 tablet (10 mg total) by mouth daily, Disp: 90 tablet, Rfl: 3  •  escitalopram (LEXAPRO) 5 mg tablet, Take 1 tablet (5 mg total) by mouth daily, Disp: 30 tablet, Rfl: 3  •  famotidine (PEPCID) 20 mg tablet, Take 1 tablet (20 mg total) by mouth daily at bedtime, Disp: 90 tablet, Rfl: 3  •  fluticasone (FLONASE) 50 mcg/act nasal spray, 1 spray into each nostril daily, Disp: 16 g, Rfl: 0  •  furosemide (LASIX) 20 mg tablet, Take one tab po every other day, Disp: 45 tablet, Rfl: 1  •  Multiple Vitamins-Minerals (PreserVision AREDS 2+Multi Vit) CAPS, Take by mouth, Disp: , Rfl:   •  omeprazole (PriLOSEC) 20 mg delayed release capsule, Take 1 capsule (20 mg total) by mouth daily before breakfast, Disp: 30 capsule, Rfl: 5  •  potassium chloride (MICRO-K) 10 MEQ CR capsule, Take 2 tabs po when you take Furosemide. 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    knee & hip   • KNEE SURGERY         FAMILY HISTORY:  History reviewed. No pertinent family history.     SOCIAL HISTORY:  Social History     Socioeconomic History   • Marital status:      Spouse name: Not on file   • Number of children: Not on file   • Years of education: Not on file   • Highest education level: Not on file   Occupational History   • Not on file   Tobacco Use   • Smoking status: Some Days     Packs/day: 0.25     Years: 20.00     Total pack years: 5.00     Types: Cigarettes   • Smokeless tobacco: Never   Vaping Use   • Vaping Use: Never used   Substance and Sexual Activity   • Alcohol use: Yes     Alcohol/week: 4.0 standard drinks of alcohol     Types: 4 Standard drinks or equivalent per week   • Drug use: Never   • Sexual activity: Not Currently     Birth control/protection: Post-menopausal   Other Topics Concern   • Not on file   Social History Narrative   • Not on file     Social Determinants of Health     Financial Resource Strain: Low Risk  (9/30/2022)    Overall Financial Resource Strain (CARDIA)    • Difficulty of Paying Living Expenses: Not hard at all   Food Insecurity: Not on file   Transportation Needs: No Transportation Needs (9/30/2022)    PRAPARE - Transportation    • Lack of Transportation (Medical): No    • Lack of Transportation (Non-Medical): No   Physical Activity: Not on file   Stress: Not on file   Social Connections: Not on file   Intimate Partner Violence: Not on file   Housing Stability: Not on file       Review of Systems   Constitutional: Negative for chills and fever. HENT: Negative for ear pain and sore throat. Eyes: Negative for pain and visual disturbance. Respiratory: Negative for cough and shortness of breath. Cardiovascular: Negative for chest pain and palpitations. Gastrointestinal: Positive for abdominal pain. Negative for vomiting. Genitourinary: Negative for dysuria and hematuria. Musculoskeletal: Negative for arthralgias and back pain. Skin: Negative for color change and rash. Neurological: Negative for seizures and syncope.    All other systems reviewed and are negative. Objective:  Vitals:    07/11/23 0947   BP: 98/68   BP Location: Right arm   Patient Position: Sitting   Cuff Size: Adult   Pulse: 83   Resp: 16   SpO2: 97%   Weight: 78.9 kg (174 lb)   Height: 5' 2" (1.575 m)     Body mass index is 31.83 kg/m². Physical Exam  Vitals and nursing note reviewed. Constitutional:       Appearance: Normal appearance. She is well-developed. She is obese. HENT:      Head: Normocephalic and atraumatic. Cardiovascular:      Rate and Rhythm: Normal rate. Heart sounds: Normal heart sounds. Pulmonary:      Effort: Pulmonary effort is normal.   Abdominal:      General: Abdomen is flat. Palpations: Abdomen is soft. Musculoskeletal:         General: Normal range of motion. Cervical back: Normal range of motion. Right lower leg: No edema. Left lower leg: No edema. Skin:     General: Skin is warm and dry. Neurological:      General: No focal deficit present. Mental Status: She is alert and oriented to person, place, and time. Mental status is at baseline. Psychiatric:         Mood and Affect: Mood normal.           RESULTS:    In chart    This note was created with voice recognition software. Phonic, grammatical and spelling errors may be present within the note as a result. Satisfactory

## 2023-10-09 ENCOUNTER — OFFICE VISIT (OUTPATIENT)
Dept: INTERNAL MEDICINE CLINIC | Facility: CLINIC | Age: 88
End: 2023-10-09
Payer: MEDICARE

## 2023-10-09 VITALS
HEART RATE: 83 BPM | HEIGHT: 62 IN | OXYGEN SATURATION: 93 % | DIASTOLIC BLOOD PRESSURE: 80 MMHG | SYSTOLIC BLOOD PRESSURE: 126 MMHG | WEIGHT: 177 LBS | BODY MASS INDEX: 32.57 KG/M2

## 2023-10-09 DIAGNOSIS — M25.512 CHRONIC LEFT SHOULDER PAIN: ICD-10-CM

## 2023-10-09 DIAGNOSIS — E78.2 MIXED HYPERLIPIDEMIA: ICD-10-CM

## 2023-10-09 DIAGNOSIS — Z00.00 MEDICARE ANNUAL WELLNESS VISIT, SUBSEQUENT: Primary | ICD-10-CM

## 2023-10-09 DIAGNOSIS — N63.23 MASS OF LOWER OUTER QUADRANT OF LEFT BREAST: ICD-10-CM

## 2023-10-09 DIAGNOSIS — G89.29 CHRONIC LEFT SHOULDER PAIN: ICD-10-CM

## 2023-10-09 DIAGNOSIS — I10 PRIMARY HYPERTENSION: ICD-10-CM

## 2023-10-09 DIAGNOSIS — J34.89 NASAL PAIN: ICD-10-CM

## 2023-10-09 DIAGNOSIS — K21.9 GASTROESOPHAGEAL REFLUX DISEASE WITHOUT ESOPHAGITIS: ICD-10-CM

## 2023-10-09 DIAGNOSIS — R79.9 ABNORMAL FINDING OF BLOOD CHEMISTRY, UNSPECIFIED: ICD-10-CM

## 2023-10-09 DIAGNOSIS — Z74.09 IMPAIRED FUNCTIONAL MOBILITY, BALANCE, GAIT, AND ENDURANCE: ICD-10-CM

## 2023-10-09 PROBLEM — R60.1 ANASARCA: Status: RESOLVED | Noted: 2021-07-12 | Resolved: 2023-10-09

## 2023-10-09 PROBLEM — R03.0 ELEVATED BLOOD PRESSURE READING: Status: RESOLVED | Noted: 2021-07-12 | Resolved: 2023-10-09

## 2023-10-09 PROBLEM — R94.31 ABNORMAL EKG: Status: RESOLVED | Noted: 2022-07-11 | Resolved: 2023-10-09

## 2023-10-09 PROBLEM — R60.0 LOCALIZED EDEMA: Status: RESOLVED | Noted: 2021-05-19 | Resolved: 2023-10-09

## 2023-10-09 PROBLEM — R00.2 PALPITATIONS: Status: RESOLVED | Noted: 2022-07-11 | Resolved: 2023-10-09

## 2023-10-09 PROBLEM — T14.8XXA WOUND OF SKIN: Status: RESOLVED | Noted: 2021-06-21 | Resolved: 2023-10-09

## 2023-10-09 PROCEDURE — G0439 PPPS, SUBSEQ VISIT: HCPCS | Performed by: INTERNAL MEDICINE

## 2023-10-09 PROCEDURE — 99214 OFFICE O/P EST MOD 30 MIN: CPT | Performed by: INTERNAL MEDICINE

## 2023-10-09 NOTE — PROGRESS NOTES
Assessment and Plan:     Problem List Items Addressed This Visit    None  Visit Diagnoses     Medicare annual wellness visit, subsequent    -  Primary    Primary hypertension        Relevant Orders    CBC and differential    Comprehensive metabolic panel    TSH, 3rd generation with Free T4 reflex    Gastroesophageal reflux disease without esophagitis        Impaired functional mobility, balance, gait, and endurance        Mass of lower outer quadrant of left breast        Relevant Orders    US breast left limited (diagnostic)    Nasal pain        Mixed hyperlipidemia        Relevant Orders    Hemoglobin A1C    Lipid Panel with Direct LDL reflex    Abnormal finding of blood chemistry, unspecified        Relevant Orders    Hemoglobin A1C    Chronic left shoulder pain              Depression Screening and Follow-up Plan: Patient was screened for depression during today's encounter. They screened negative with a PHQ-2 score of 0. Urinary Incontinence Plan of Care: counseling topics discussed: practice Kegel (pelvic floor strengthening) exercises, limit alcohol, caffeine, spicy foods, and acidic foods and limiting fluid intake 3-4 hours before bed. Preventive health issues were discussed with patient, and age appropriate screening tests were ordered as noted in patient's After Visit Summary. Personalized health advice and appropriate referrals for health education or preventive services given if needed, as noted in patient's After Visit Summary. History of Present Illness:     Patient presents for a Medicare Wellness Visit    Here for AWV and routine f/u; accompanied by her aide; she is living in an elderly complex; reports few issues; She has a left lower outer hard fixed breast mass, h/o breast cancer to left breast with RT; check diagnostic mammogram, says it has been there x 2 years;     Left shoulder pain with limited ROM; recommend ortho; she will call when she is ready;     Right nasal nodule; prefers to monitor; offered ENT referral;    HTN controlled; continue enalapril; continue lexapro for mood;    Labs ordered for next visit, check cbc, CMP, TSH, a1c, lipid; Patient Care Team:  Panda Hernandez MD as PCP - General (Internal Medicine)     Review of Systems:     Review of Systems   Constitutional: Negative for chills and fever. HENT: Negative for ear pain and sore throat. Eyes: Negative for pain and visual disturbance. Respiratory: Negative for cough and shortness of breath. Cardiovascular: Negative for chest pain and palpitations. Gastrointestinal: Negative for abdominal pain and vomiting. Genitourinary: Negative for dysuria and hematuria. Breast lump     Musculoskeletal: Positive for arthralgias and gait problem. Negative for back pain. Left shoulder pain   Skin: Negative for color change and rash. Neurological: Negative for seizures and syncope. All other systems reviewed and are negative. 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    knee & hip   • KNEE SURGERY        Family History:     History reviewed. No pertinent family history. Social History:     Social History     Socioeconomic History   • Marital status:       Spouse name: None   • Number of children: None   • Years of education: None   • Highest education level: None   Occupational History   • None   Tobacco Use   • Smoking status: Some Days     Packs/day: 0.25     Years: 20.00     Total pack years: 5.00     Types: Cigarettes   • Smokeless tobacco: Never   Vaping Use   • Vaping Use: Never used   Substance and Sexual Activity   • Alcohol use: Yes     Alcohol/week: 4.0 standard drinks of alcohol     Types: 4 Standard drinks or equivalent per week   • Drug use: Never   • Sexual activity: Not Currently     Birth control/protection: Post-menopausal   Other Topics Concern   • None   Social History Narrative   • None     Social Determinants of Health     Financial Resource Strain: Low Risk  (10/9/2023)    Overall Financial Resource Strain (CARDIA)    • Difficulty of Paying Living Expenses: Not hard at all   Food Insecurity: Not on file   Transportation Needs: No Transportation Needs (10/9/2023)    PRAPARE - Transportation    • Lack of Transportation (Medical): No    • Lack of Transportation (Non-Medical): No   Physical Activity: Not on file   Stress: Not on file   Social Connections: Not on file   Intimate Partner Violence: Not on file   Housing Stability: Not on file      Medications and Allergies:     Current Outpatient Medications   Medication Sig Dispense Refill   • acetaminophen (TYLENOL) 500 mg tablet Take 1 tablet (500 mg total) by mouth every 6 (six) hours as needed for mild pain 30 tablet 0   • enalapril (VASOTEC) 10 mg tablet Take 1 tablet (10 mg total) by mouth daily 90 tablet 3   • escitalopram (LEXAPRO) 5 mg tablet Take 1 tablet (5 mg total) by mouth daily 30 tablet 3   • furosemide (LASIX) 20 mg tablet Take one tab po every other day 45 tablet 1   • Multiple Vitamins-Minerals (PreserVision AREDS 2+Multi Vit) CAPS Take by mouth     • omeprazole (PriLOSEC) 20 mg delayed release capsule Take 1 capsule (20 mg total) by mouth daily before breakfast 30 capsule 5   • potassium chloride (MICRO-K) 10 MEQ CR capsule Take 2 tabs po when you take Furosemide. 180 capsule 0     No current facility-administered medications for this visit.      No Known Allergies   Immunizations:     Immunization History   Administered Date(s) Administered   • COVID-19 MODERNA VACC 0.5 ML IM 2021, 2021, 2021, 2022      Health Maintenance: There are no preventive care reminders to display for this patient. Topic Date Due   • Pneumococcal Vaccine: 65+ Years (1 - PCV) Never done   • COVID-19 Vaccine (5 - Moderna series) 2022   • Influenza Vaccine (1) Never done      Medicare Screening Tests and Risk Assessments:     Charmayne Natal is here for her Subsequent Wellness visit. Last Medicare Wellness visit information reviewed, patient interviewed and updates made to the record today. Health Risk Assessment:   Patient rates overall health as good. Patient feels that their physical health rating is same. Patient is satisfied with their life. Eyesight was rated as same. Hearing was rated as same. Patient feels that their emotional and mental health rating is much better. Patients states they are never, rarely angry. Patient states they are never, rarely unusually tired/fatigued. Pain experienced in the last 7 days has been some. Patient's pain rating has been 3/10. Patient states that she has experienced no weight loss or gain in last 6 months. Depression Screening:   PHQ-2 Score: 0      Fall Risk Screening: In the past year, patient has experienced: no history of falling in past year      Urinary Incontinence Screening:   Patient has leaked urine accidently in the last six months. Home Safety:  Patient has trouble with stairs inside or outside of their home. Patient has working smoke alarms and has working carbon monoxide detector. Home safety hazards include: none. Nutrition:   Current diet is No Added Salt and Regular. Medications:   Patient is currently taking over-the-counter supplements. OTC medications include: see medication list. Patient is not able to manage medications.      Activities of Daily Living (ADLs)/Instrumental Activities of Daily Living (IADLs):   Walk and transfer into and out of bed and chair?: No  Dress and groom yourself?: No    Bathe or shower yourself?: No    Feed yourself? Yes  Do your laundry/housekeeping?: No  Manage your money, pay your bills and track your expenses?: No  Make your own meals?: No    Do your own shopping?: No    Previous Hospitalizations:   Any hospitalizations or ED visits within the last 12 months?: No      Advance Care Planning:   Living will: Yes    Advanced directive: Yes      Cognitive Screening:   Provider or family/friend/caregiver concerned regarding cognition?: No    PREVENTIVE SCREENINGS      Cardiovascular Screening:    General: Screening Current      Diabetes Screening:     General: Screening Current      Colorectal Cancer Screening:     General: Screening Not Indicated      Breast Cancer Screening:     General: History Breast Cancer      Cervical Cancer Screening:    General: Screening Not Indicated      Osteoporosis Screening:    General: Screening Not Indicated      Abdominal Aortic Aneurysm (AAA) Screening:        General: Screening Not Indicated      Lung Cancer Screening:     General: Screening Not Indicated      Hepatitis C Screening:    General: Screening Not Indicated    Screening, Brief Intervention, and Referral to Treatment (SBIRT)    Screening  Typical number of drinks in a day: 0  Typical number of drinks in a week: 3  Interpretation: Low risk drinking behavior. Brief Intervention  Alcohol & drug use screenings were reviewed. No concerns regarding substance use disorder identified. No results found. Physical Exam:     /80 (BP Location: Left arm, Patient Position: Sitting, Cuff Size: Adult)   Pulse 83   Ht 5' 2" (1.575 m)   Wt 80.3 kg (177 lb)   SpO2 93%   BMI 32.37 kg/m²     Physical Exam  Vitals and nursing note reviewed. Constitutional:       General: She is not in acute distress. Appearance: She is well-developed.    HENT:      Head: Normocephalic and atraumatic. Eyes:      Conjunctiva/sclera: Conjunctivae normal.   Cardiovascular:      Rate and Rhythm: Normal rate and regular rhythm. Heart sounds: Murmur heard. Pulmonary:      Effort: Pulmonary effort is normal. No respiratory distress. Breath sounds: Normal breath sounds. Abdominal:      Tenderness: There is no abdominal tenderness. Genitourinary:     Comments: Breasts: hard breast mass under the areola, non tender  . Musculoskeletal:         General: Tenderness and signs of injury present. No swelling. Cervical back: Neck supple. Skin:     General: Skin is warm and dry. Capillary Refill: Capillary refill takes less than 2 seconds. Coloration: Skin is pale. Findings: Bruising present. Neurological:      Mental Status: She is alert and oriented to person, place, and time. Gait: Gait abnormal (using w/c).    Psychiatric:         Mood and Affect: Mood normal.          Margaret Zuluaga MD

## 2023-10-09 NOTE — PATIENT INSTRUCTIONS
Medicare Preventive Visit Patient Instructions  Thank you for completing your Welcome to Medicare Visit or Medicare Annual Wellness Visit today. Your next wellness visit will be due in one year (10/9/2024). The screening/preventive services that you may require over the next 5-10 years are detailed below. Some tests may not apply to you based off risk factors and/or age. Screening tests ordered at today's visit but not completed yet may show as past due. Also, please note that scanned in results may not display below. Preventive Screenings:  Service Recommendations Previous Testing/Comments   Colorectal Cancer Screening  * Colonoscopy    * Fecal Occult Blood Test (FOBT)/Fecal Immunochemical Test (FIT)  * Fecal DNA/Cologuard Test  * Flexible Sigmoidoscopy Age: 43-73 years old   Colonoscopy: every 10 years (may be performed more frequently if at higher risk)  OR  FOBT/FIT: every 1 year  OR  Cologuard: every 3 years  OR  Sigmoidoscopy: every 5 years  Screening may be recommended earlier than age 39 if at higher risk for colorectal cancer. Also, an individualized decision between you and your healthcare provider will decide whether screening between the ages of 77-80 would be appropriate. Colonoscopy: Not on file  FOBT/FIT: Not on file  Cologuard: Not on file  Sigmoidoscopy: Not on file    Screening Not Indicated     Breast Cancer Screening Age: 36 years old  Frequency: every 1-2 years  Not required if history of left and right mastectomy Mammogram: Not on file    History Breast Cancer   Cervical Cancer Screening Between the ages of 21-29, pap smear recommended once every 3 years. Between the ages of 32-69, can perform pap smear with HPV co-testing every 5 years.    Recommendations may differ for women with a history of total hysterectomy, cervical cancer, or abnormal pap smears in past. Pap Smear: Not on file    Screening Not Indicated   Hepatitis C Screening Once for adults born between [address] [place] [address] frequently in patients at high risk for Hepatitis C Hep C Antibody: Not on file        Diabetes Screening 1-2 times per year if you're at risk for diabetes or have pre-diabetes Fasting glucose: 95 mg/dL (4/20/2023)  A1C: 4.8 % (4/20/2023)  Screening Current   Cholesterol Screening Once every 5 years if you don't have a lipid disorder. May order more often based on risk factors. Lipid panel: 04/20/2023    Screening Current     Other Preventive Screenings Covered by Medicare:  1. Abdominal Aortic Aneurysm (AAA) Screening: covered once if your at risk. You're considered to be at risk if you have a family history of AAA. 2. Lung Cancer Screening: covers low dose CT scan once per year if you meet all of the following conditions: (1) Age 48-67; (2) No signs or symptoms of lung cancer; (3) Current smoker or have quit smoking within the last 15 years; (4) You have a tobacco smoking history of at least 20 pack years (packs per day multiplied by number of years you smoked); (5) You get a written order from a healthcare provider. 3. Glaucoma Screening: covered annually if you're considered high risk: (1) You have diabetes OR (2) Family history of glaucoma OR (3)  aged 48 and older OR (3)  American aged 72 and older  3. Osteoporosis Screening: covered every 2 years if you meet one of the following conditions: (1) You're estrogen deficient and at risk for osteoporosis based off medical history and other findings; (2) Have a vertebral abnormality; (3) On glucocorticoid therapy for more than 3 months; (4) Have primary hyperparathyroidism; (5) On osteoporosis medications and need to assess response to drug therapy. · Last bone density test (DXA Scan): Not on file. 5. HIV Screening: covered annually if you're between the age of 14-79. Also covered annually if you are younger than 13 and older than 72 with risk factors for HIV infection.  For pregnant patients, it is covered up to 3 times per pregnancy. Immunizations:  Immunization Recommendations   Influenza Vaccine Annual influenza vaccination during flu season is recommended for all persons aged >= 6 months who do not have contraindications   Pneumococcal Vaccine   * Pneumococcal conjugate vaccine = PCV13 (Prevnar 13), PCV15 (Vaxneuvance), PCV20 (Prevnar 20)  * Pneumococcal polysaccharide vaccine = PPSV23 (Pneumovax) Adults 20-63 years old: 1-3 doses may be recommended based on certain risk factors  Adults 72 years old: 1-2 doses may be recommended based off what pneumonia vaccine you previously received   Hepatitis B Vaccine 3 dose series if at intermediate or high risk (ex: diabetes, end stage renal disease, liver disease)   Tetanus (Td) Vaccine - COST NOT COVERED BY MEDICARE PART B Following completion of primary series, a booster dose should be given every 10 years to maintain immunity against tetanus. Td may also be given as tetanus wound prophylaxis. Tdap Vaccine - COST NOT COVERED BY MEDICARE PART B Recommended at least once for all adults. For pregnant patients, recommended with each pregnancy. Shingles Vaccine (Shingrix) - COST NOT COVERED BY MEDICARE PART B  2 shot series recommended in those aged 48 and above     Health Maintenance Due:  There are no preventive care reminders to display for this patient. Immunizations Due:      Topic Date Due   • Pneumococcal Vaccine: 65+ Years (1 - PCV) Never done   • COVID-19 Vaccine (5 - Moderna series) 06/13/2022   • Influenza Vaccine (1) Never done     Advance Directives   What are advance directives? Advance directives are legal documents that state your wishes and plans for medical care. These plans are made ahead of time in case you lose your ability to make decisions for yourself. Advance directives can apply to any medical decision, such as the treatments you want, and if you want to donate organs. What are the types of advance directives?   There are many types of advance directives, and each state has rules about how to use them. You may choose a combination of any of the following:  · Living will: This is a written record of the treatment you want. You can also choose which treatments you do not want, which to limit, and which to stop at a certain time. This includes surgery, medicine, IV fluid, and tube feedings. · Durable power of  for healthcare [facility]): This is a written record that states who you want to make healthcare choices for you when you are unable to make them for yourself. This person, called a proxy, is usually a family member or a friend. You may choose more than 1 proxy. · Do not resuscitate (DNR) order:  A DNR order is used in case your heart stops beating or you stop breathing. It is a request not to have certain forms of treatment, such as CPR. A DNR order may be included in other types of advance directives. · Medical directive: This covers the care that you want if you are in a coma, near death, or unable to make decisions for yourself. You can list the treatments you want for each condition. Treatment may include pain medicine, surgery, blood transfusions, dialysis, IV or tube feedings, and a ventilator (breathing machine). · Values history: This document has questions about your views, beliefs, and how you feel and think about life. This information can help others choose the care that you would choose. Why are advance directives important? An advance directive helps you control your care. Although spoken wishes may be used, it is better to have your wishes written down. Spoken wishes can be misunderstood, or not followed. Treatments may be given even if you do not want them. An advance directive may make it easier for your family to make difficult choices about your care. Urinary Incontinence   Urinary incontinence (UI)  is when you lose control of your bladder. UI develops because your bladder cannot store or empty urine properly.  The 3 most common types of UI are stress incontinence, urge incontinence, or both. Medicines:   · May be given to help strengthen your bladder control. Report any side effects of medication to your healthcare provider. Do pelvic muscle exercises often:  Your pelvic muscles help you stop urinating. Squeeze these muscles tight for 5 seconds, then relax for 5 seconds. Gradually work up to squeezing for 10 seconds. Do 3 sets of 15 repetitions a day, or as directed. This will help strengthen your pelvic muscles and improve bladder control. Train your bladder:  Go to the bathroom at set times, such as every 2 hours, even if you do not feel the urge to go. You can also try to hold your urine when you feel the urge to go. For example, hold your urine for 5 minutes when you feel the urge to go. As that becomes easier, hold your urine for 10 minutes. Self-care:   · Keep a UI record. Write down how often you leak urine and how much you leak. Make a note of what you were doing when you leaked urine. · Drink liquids as directed. You may need to limit the amount of liquid you drink to help control your urine leakage. Do not drink any liquid right before you go to bed. Limit or do not have drinks that contain caffeine or alcohol. · Prevent constipation. Eat a variety of high-fiber foods. Good examples are high-fiber cereals, beans, vegetables, and whole-grain breads. Walking is the best way to trigger your intestines to have a bowel movement. · Exercise regularly and maintain a healthy weight. Weight loss and exercise will decrease pressure on your bladder and help you control your leakage. · Use a catheter as directed  to help empty your bladder. A catheter is a tiny, plastic tube that is put into your bladder to drain your urine. · Go to behavior therapy as directed. Behavior therapy may be used to help you learn to control your urge to urinate.     Cigarette Smoking and Your Health   Risks to your health if you smoke: Nicotine and other chemicals found in tobacco damage every cell in your body. Even if you are a light smoker, you have an increased risk for cancer, heart disease, and lung disease. If you are pregnant or have diabetes, smoking increases your risk for complications. Benefits to your health if you stop smoking:   · You decrease respiratory symptoms such as coughing, wheezing, and shortness of breath. · You reduce your risk for cancers of the lung, mouth, throat, kidney, bladder, pancreas, stomach, and cervix. If you already have cancer, you increase the benefits of chemotherapy. You also reduce your risk for cancer returning or a second cancer from developing. · You reduce your risk for heart disease, blood clots, heart attack, and stroke. · You reduce your risk for lung infections, and diseases such as pneumonia, asthma, chronic bronchitis, and emphysema. · Your circulation improves. More oxygen can be delivered to your body. If you have diabetes, you lower your risk for complications, such as kidney, artery, and eye diseases. You also lower your risk for nerve damage. Nerve damage can lead to amputations, poor vision, and blindness. · You improve your body's ability to heal and to fight infections. For more information and support to stop smoking:   · [employer]. go[employer]  Phone: 6- 931 - 743-1531  Web Address: www.[employer]. [employer]  Weight Management   Why it is important to manage your weight:  Being overweight increases your risk of health conditions such as heart disease, high blood pressure, type 2 diabetes, and certain types of cancer. It can also increase your risk for osteoarthritis, sleep apnea, and other respiratory problems. Aim for a slow, steady weight loss. Even a small amount of weight loss can lower your risk of health problems. How to lose weight safely:  A safe and healthy way to lose weight is to eat fewer calories and get regular exercise.  You can lose up about 1 pound a week by decreasing the number of calories you eat by 500 calories each day. Healthy meal plan for weight management:  A healthy meal plan includes a variety of foods, contains fewer calories, and helps you stay healthy. A healthy meal plan includes the following:  · Eat whole-grain foods more often. A healthy meal plan should contain fiber. Fiber is the part of grains, fruits, and vegetables that is not broken down by your body. Whole-grain foods are healthy and provide extra fiber in your diet. Some examples of whole-grain foods are whole-wheat breads and pastas, oatmeal, brown rice, and bulgur. · Eat a variety of vegetables every day. Include dark, leafy greens such as spinach, kale, amara greens, and mustard greens. Eat yellow and orange vegetables such as carrots, sweet potatoes, and winter squash. · Eat a variety of fruits every day. Choose fresh or canned fruit (canned in its own juice or light syrup) instead of juice. Fruit juice has very little or no fiber. · Eat low-fat dairy foods. Drink fat-free (skim) milk or 1% milk. Eat fat-free yogurt and low-fat cottage cheese. Try low-fat cheeses such as mozzarella and other reduced-fat cheeses. · Choose meat and other protein foods that are low in fat. Choose beans or other legumes such as split peas or lentils. Choose fish, skinless poultry (chicken or turkey), or lean cuts of red meat (beef or pork). Before you cook meat or poultry, cut off any visible fat. · Use less fat and oil. Try baking foods instead of frying them. Add less fat, such as margarine, sour cream, regular salad dressing and mayonnaise to foods. Eat fewer high-fat foods. Some examples of high-fat foods include french fries, doughnuts, ice cream, and cakes. · Eat fewer sweets. Limit foods and drinks that are high in sugar. This includes candy, cookies, regular soda, and sweetened drinks. Exercise:  Exercise at least 30 minutes per day on most days of the week.  Some examples of exercise include walking, biking, dancing, and swimming. You can also fit in more physical activity by taking the stairs instead of the elevator or parking farther away from stores. Ask your healthcare provider about the best exercise plan for you. © Copyright [employer] 2018 Information is for End User's use only and may not be sold, redistributed or otherwise used for commercial purposes.  All illustrations and images included in CareNotes® are the copyrighted property of [employer] Inc. or [address] [place] [address]

## 2023-10-31 DIAGNOSIS — F32.A DEPRESSION, UNSPECIFIED DEPRESSION TYPE: ICD-10-CM

## 2023-10-31 RX ORDER — ESCITALOPRAM OXALATE 5 MG/1
5 TABLET ORAL DAILY
Qty: 30 TABLET | Refills: 3 | Status: SHIPPED | OUTPATIENT
Start: 2023-10-31

## 2024-01-17 ENCOUNTER — HOSPITAL ENCOUNTER (OUTPATIENT)
Dept: MAMMOGRAPHY | Facility: CLINIC | Age: 89
Discharge: HOME | End: 2024-01-17
Payer: MEDICARE

## 2024-01-17 ENCOUNTER — HOSPITAL ENCOUNTER (OUTPATIENT)
Dept: ULTRASOUND IMAGING | Facility: CLINIC | Age: 89
Discharge: HOME | End: 2024-01-17
Payer: MEDICARE

## 2024-01-17 DIAGNOSIS — N63.0 BREAST MASS: ICD-10-CM

## 2024-01-17 DIAGNOSIS — N63.23 MASS OF LOWER OUTER QUADRANT OF LEFT BREAST: ICD-10-CM

## 2024-01-17 PROCEDURE — G0279 TOMOSYNTHESIS, MAMMO: HCPCS

## 2024-01-17 PROCEDURE — 76642 ULTRASOUND BREAST LIMITED: CPT

## 2024-01-17 PROCEDURE — 77066 DX MAMMO INCL CAD BI: CPT

## 2024-01-25 DIAGNOSIS — K21.9 GASTROESOPHAGEAL REFLUX DISEASE WITHOUT ESOPHAGITIS: ICD-10-CM

## 2024-01-25 RX ORDER — OMEPRAZOLE 20 MG/1
20 CAPSULE, DELAYED RELEASE ORAL
Qty: 30 CAPSULE | Refills: 5 | Status: SHIPPED | OUTPATIENT
Start: 2024-01-25 | End: 2024-07-23

## 2024-02-06 ENCOUNTER — OFFICE VISIT (OUTPATIENT)
Age: 89
End: 2024-02-06
Payer: MEDICARE

## 2024-02-06 ENCOUNTER — HOSPITAL ENCOUNTER (EMERGENCY)
Facility: HOSPITAL | Age: 89
Discharge: HOME | End: 2024-02-06
Attending: EMERGENCY MEDICINE
Payer: MEDICARE

## 2024-02-06 VITALS
HEART RATE: 83 BPM | DIASTOLIC BLOOD PRESSURE: 96 MMHG | TEMPERATURE: 98.3 F | RESPIRATION RATE: 18 BRPM | OXYGEN SATURATION: 100 % | SYSTOLIC BLOOD PRESSURE: 146 MMHG

## 2024-02-06 VITALS
DIASTOLIC BLOOD PRESSURE: 86 MMHG | HEART RATE: 65 BPM | WEIGHT: 177 LBS | OXYGEN SATURATION: 96 % | RESPIRATION RATE: 18 BRPM | SYSTOLIC BLOOD PRESSURE: 132 MMHG | BODY MASS INDEX: 32.37 KG/M2

## 2024-02-06 DIAGNOSIS — S81.819A SKIN TEAR OF LOWER LEG WITHOUT COMPLICATION, INITIAL ENCOUNTER: Primary | ICD-10-CM

## 2024-02-06 DIAGNOSIS — S81.812A ISTAP TYPE 3 SKIN TEAR OF LEFT LOWER EXTREMITY: Primary | ICD-10-CM

## 2024-02-06 PROCEDURE — 99284 EMERGENCY DEPT VISIT MOD MDM: CPT | Performed by: PHYSICIAN ASSISTANT

## 2024-02-06 PROCEDURE — G0463 HOSPITAL OUTPT CLINIC VISIT: HCPCS | Performed by: STUDENT IN AN ORGANIZED HEALTH CARE EDUCATION/TRAINING PROGRAM

## 2024-02-06 PROCEDURE — 99283 EMERGENCY DEPT VISIT LOW MDM: CPT

## 2024-02-06 PROCEDURE — 99213 OFFICE O/P EST LOW 20 MIN: CPT | Performed by: STUDENT IN AN ORGANIZED HEALTH CARE EDUCATION/TRAINING PROGRAM

## 2024-02-06 RX ORDER — CEPHALEXIN 500 MG/1
500 CAPSULE ORAL 4 TIMES DAILY
Qty: 28 CAPSULE | Refills: 0 | Status: SHIPPED | OUTPATIENT
Start: 2024-02-06 | End: 2024-02-13

## 2024-02-06 RX ORDER — GINSENG 100 MG
1 CAPSULE ORAL ONCE
Status: COMPLETED | OUTPATIENT
Start: 2024-02-06 | End: 2024-02-06

## 2024-02-06 RX ORDER — CEPHALEXIN 250 MG/1
500 CAPSULE ORAL ONCE
Status: COMPLETED | OUTPATIENT
Start: 2024-02-06 | End: 2024-02-06

## 2024-02-06 RX ORDER — LIDOCAINE 40 MG/G
CREAM TOPICAL ONCE
Status: COMPLETED | OUTPATIENT
Start: 2024-02-06 | End: 2024-02-06

## 2024-02-06 RX ADMIN — BACITRACIN ZINC 1 LARGE APPLICATION: 500 OINTMENT TOPICAL at 15:45

## 2024-02-06 RX ADMIN — LIDOCAINE 4% 1 APPLICATION: 4 CREAM TOPICAL at 15:46

## 2024-02-06 RX ADMIN — CEPHALEXIN 500 MG: 250 CAPSULE ORAL at 16:00

## 2024-02-06 NOTE — PROGRESS NOTES
[facility]        NAME: Diya Garcia is a 89 y.o. female  : 1929    MRN: [id number]  DATE: 2024  TIME: 2:12 PM    Assessment and Orders   ISTAP type 3 skin tear of left lower extremity [S81.812A]  1. ISTAP type 3 skin tear of left lower extremity  Transfer to other facility            Plan and Discussion      89 yo F with large skin tear on the left thigh. Not on blood thinners. Significant amounts of bruising and bleeding. Needs higher level of wound care. Referred to ED for further management.       Discussed ED precautions including (but not limited to)  Difficultly breathing or shortness of breath  Chest pain  Acutely worsening symptoms.     Risks and benefits discussed. Patient understands and agrees with the plan.     Follow up with PCP.     Chief Complaint     Chief Complaint   Patient presents with    Extremity Laceration     Pt caregiver fell over pt's walker causing her to fall on top of the pt. Pt had her left leg scratched during the fall.          History of Present Illness       Pain in thigh is severe. Continues to bleed. Tried to stop bleeding with compression with little success as it causes too much pain.      Leg Pain   The incident occurred less than 1 hour ago. The incident occurred at home. Injury mechanism: care giver tripped over wheelchair and ripped skin on upper thigh. The pain is present in the left thigh. The quality of the pain is described as burning. She reports no foreign bodies present.       Review of Systems   Review of Systems   Cardiovascular:  Positive for leg swelling (chronic).   Skin:  Positive for color change (large amounts of bruising lateral to the wound) and wound.         Current Medications       Current Outpatient Medications:     acetaminophen (TYLENOL) 500 mg tablet, Take 1 tablet (500 mg total) by mouth every 6 (six) hours as needed for mild pain, Disp: 30 tablet, Rfl: 0    enalapril (VASOTEC) 10 mg tablet, Take 1 tablet (10 mg  total) by mouth daily, Disp: 90 tablet, Rfl: 3    escitalopram (LEXAPRO) 5 mg tablet, Take 1 tablet (5 mg total) by mouth daily, Disp: 30 tablet, Rfl: 3    furosemide (LASIX) 20 mg tablet, Take one tab po every other day, Disp: 45 tablet, Rfl: 1    Multiple Vitamins-Minerals (PreserVision AREDS 2+Multi Vit) CAPS, Take by mouth, Disp: , Rfl:     omeprazole (PriLOSEC) 20 mg delayed release capsule, Take 1 capsule (20 mg total) by mouth daily before breakfast, Disp: 30 capsule, Rfl: 5    potassium chloride (MICRO-K) 10 MEQ CR capsule, Take 2 tabs po when you take Furosemide., Disp: 180 capsule, Rfl: 0    Current Allergies     Allergies as of 2024    (No Known Allergies)            The following portions of the patient's history were reviewed and updated as appropriate: allergies, current medications, past family history, past medical history, past social history, past surgical history and problem list.     Past Medical History:   Diagnosis Date    Arthritis     approximately    Breast cancer (HCC)     Cancer (HCC)     approximately    HL (hearing loss)     Hypertension     on and off    Macular degeneration     Visual impairment 2018    macular degeneration       Past Surgical History:   Procedure Laterality Date    EAR SURGERY      EYE SURGERY      cataracts    HIP SURGERY      JOINT REPLACEMENT  ?    knee & hip    KNEE SURGERY         History reviewed. No pertinent family history.      Medications have been verified.        Objective   /86   Pulse 65   Resp 18   Wt 80.3 kg (177 lb)   SpO2 96%   BMI 32.37 kg/m²   No LMP recorded. Patient is postmenopausal.       Physical Exam     Physical Exam  Constitutional:       General: She is not in acute distress.  Pulmonary:      Effort: Pulmonary effort is normal.   Musculoskeletal:      Left lower le+ Edema present.   Skin:         Neurological:      Mental Status: She is alert.      Gait: Gait abnormal (in wheel chair).   Psychiatric:          Mood and Affect: Mood normal.               Jammie Whitehead DO

## 2024-02-06 NOTE — ED PROVIDER NOTES
History  Chief Complaint   Patient presents with    Wound Check     89 yo F with large skin tear on the left thigh. Not on blood thinners. Significant amounts of bruising and bleeding. Needs higher level of wound care. Referred to ED for further management.     89 yo with wound to left thigh. Unfortunately her caregiver tripped over the patient's walker and stretched out her arms and accidentally grabbed the patient's left medial thigh causing a large skin tear. No other injuries reported. Went to urgent care who was unable to care for the wound and was referred to ED.      History provided by:  Patient   used: No    Wound Check         Prior to Admission Medications   Prescriptions Last Dose Informant Patient Reported? Taking?   Multiple Vitamins-Minerals (PreserVision AREDS 2+Multi Vit) CAPS  Self, Care Giver Yes No   Sig: Take by mouth   acetaminophen (TYLENOL) 500 mg tablet  Self, Care Giver No No   Sig: Take 1 tablet (500 mg total) by mouth every 6 (six) hours as needed for mild pain   enalapril (VASOTEC) 10 mg tablet  Self, Care Giver No No   Sig: Take 1 tablet (10 mg total) by mouth daily   escitalopram (LEXAPRO) 5 mg tablet   No No   Sig: Take 1 tablet (5 mg total) by mouth daily   furosemide (LASIX) 20 mg tablet  Self, Care Giver No No   Sig: Take one tab po every other day   omeprazole (PriLOSEC) 20 mg delayed release capsule   No No   Sig: Take 1 capsule (20 mg total) by mouth daily before breakfast   potassium chloride (MICRO-K) 10 MEQ CR capsule  Self, Care Giver No No   Sig: Take 2 tabs po when you take Furosemide.      Facility-Administered Medications: None       Past Medical History:   Diagnosis Date    Arthritis 2000    approximately    Breast cancer (HCC)     Cancer (HCC) 2010    approximately    HL (hearing loss) 2010    Hypertension     on and off    Macular degeneration     Visual impairment 2018    macular degeneration       Past Surgical History:   Procedure Laterality  Date    EAR SURGERY      EYE SURGERY      cataracts    HIP SURGERY      JOINT REPLACEMENT  ?    knee & hip    KNEE SURGERY         No family history on file.  I have reviewed and agree with the history as documented.    E-Cigarette/Vaping    E-Cigarette Use Never User      E-Cigarette/Vaping Substances    Nicotine No     THC No     CBD No     Flavoring No     Other No     Unknown No      Social History     Tobacco Use    Smoking status: Some Days     Current packs/day: 0.25     Average packs/day: 0.3 packs/day for 20.0 years (5.0 ttl pk-yrs)     Types: Cigarettes    Smokeless tobacco: Never   Vaping Use    Vaping status: Never Used   Substance Use Topics    Alcohol use: Yes     Alcohol/week: 4.0 standard drinks of alcohol     Types: 4 Standard drinks or equivalent per week    Drug use: Never       Review of Systems   Constitutional:  Negative for chills and fever.   HENT:  Negative for ear pain and sore throat.    Eyes:  Negative for pain and visual disturbance.   Respiratory:  Negative for cough and shortness of breath.    Cardiovascular:  Negative for chest pain and palpitations.   Gastrointestinal:  Negative for abdominal pain and vomiting.   Genitourinary:  Negative for dysuria and hematuria.   Musculoskeletal:  Negative for arthralgias and back pain.   Skin:  Positive for wound. Negative for color change and rash.   Neurological:  Negative for seizures and syncope.   All other systems reviewed and are negative.      Physical Exam  Physical Exam  Vitals and nursing note reviewed.   Constitutional:       General: She is not in acute distress.     Appearance: She is well-developed.   HENT:      Head: Normocephalic and atraumatic.   Eyes:      Conjunctiva/sclera: Conjunctivae normal.   Cardiovascular:      Rate and Rhythm: Normal rate and regular rhythm.      Heart sounds: No murmur heard.  Pulmonary:      Effort: Pulmonary effort is normal. No respiratory distress.      Breath sounds: Normal breath sounds.    Abdominal:      Palpations: Abdomen is soft.      Tenderness: There is no abdominal tenderness.   Musculoskeletal:         General: No swelling.      Cervical back: Neck supple.   Skin:     General: Skin is warm and dry.      Capillary Refill: Capillary refill takes less than 2 seconds.          Neurological:      Mental Status: She is alert.   Psychiatric:         Mood and Affect: Mood normal.         Vital Signs  ED Triage Vitals [02/06/24 1439]   Temperature Pulse Respirations Blood Pressure SpO2   98.3 °F (36.8 °C) 83 18 146/96 100 %      Temp Source Heart Rate Source Patient Position - Orthostatic VS BP Location FiO2 (%)   Temporal Monitor Sitting Left arm --      Pain Score       --           Vitals:    02/06/24 1439   BP: 146/96   Pulse: 83   Patient Position - Orthostatic VS: Sitting         Visual Acuity      ED Medications  Medications   bacitracin topical ointment 1 large application (1 large application Topical Given 2/6/24 1545)   lidocaine (LMX) 4 % cream (1 Application Topical Given 2/6/24 1546)   cephalexin (KEFLEX) capsule 500 mg (500 mg Oral Given 2/6/24 1600)       Diagnostic Studies  Results Reviewed       None                   No orders to display              Procedures  Procedures         ED Course                               SBIRT 20yo+      Flowsheet Row Most Recent Value   Initial Alcohol Screen: US AUDIT-C     1. How often do you have a drink containing alcohol? 0 Filed at: 02/06/2024 1441   2. How many drinks containing alcohol do you have on a typical day you are drinking?  0 Filed at: 02/06/2024 1441   3a. Male UNDER 65: How often do you have five or more drinks on one occasion? 0 Filed at: 02/06/2024 1441   3b. FEMALE Any Age, or MALE 65+: How often do you have 4 or more drinks on one occassion? 0 Filed at: 02/06/2024 1441   Audit-C Score 0 Filed at: 02/06/2024 1441   STEFANIE: How many times in the past year have you...    Used an illegal drug or used a prescription medication for  non-medical reasons? Never Filed at: 02/06/2024 1441                      Medical Decision Making  Ddx includes but is not limited to skin tear, laceration, abrasion, cellulitis  Plan: wound dressing.     MDM: 89 yo with skin tear. Bacitracin and lido topical applied. Xeroform dressing with 4x4 and juvencio used. Needs f/u wound care. Started on abx. Has caregivers at home who can assist in dressing changes. Return parameters provided. Pt understands and agrees with plan.      Risk  OTC drugs.  Prescription drug management.             Disposition  Final diagnoses:   Skin tear of lower leg without complication, initial encounter     Time reflects when diagnosis was documented in both MDM as applicable and the Disposition within this note       Time User Action Codes Description Comment    2/6/2024  3:58 PM Tito Evans Add [S81.819A] Skin tear of lower leg without complication, initial encounter           ED Disposition       ED Disposition   Discharge    Condition   Stable    Date/Time   Tue Feb 6, 2024 1600    Comment   Diya Garcia discharge to home/self care.                   Follow-up Information       Follow up With Specialties Details Why Contact Info Additional Information    [facility] Wound Care Call in 1 day  [address]  [address]  [place] [address]  [phone] [facility], [address] [place] [id number]              Patient's Medications   Discharge Prescriptions    CEPHALEXIN (KEFLEX) 500 MG CAPSULE    Take 1 capsule (500 mg total) by mouth 4 (four) times a day for 7 days       Start Date: 2/6/2024  End Date: 2/13/2024       Order Dose: 500 mg       Quantity: 28 capsule    Refills: 0           PDMP Review         Value Time User    PDMP Reviewed  Yes 5/19/2023  2:21 PM Adan Boswell DO            ED Provider  Electronically Signed by             Tito Evans PA-C  02/06/24  [id number]

## 2024-02-06 NOTE — DISCHARGE INSTRUCTIONS
Leave first dressing on for 24-48 hours. When doing a dressing change apply bacitracin topically first followed by the Xeroform (yellow) dressing. Then put 4x4 gauze dressing on top of the Xeroform and wrap with the gauze roll.

## 2024-02-09 ENCOUNTER — TELEPHONE (OUTPATIENT)
Age: 89
End: 2024-02-09

## 2024-02-09 ENCOUNTER — OFFICE VISIT (OUTPATIENT)
Dept: WOUND CARE | Facility: CLINIC | Age: 89
End: 2024-02-09
Payer: MEDICARE

## 2024-02-09 ENCOUNTER — HOME HEALTH ADMISSION (OUTPATIENT)
Dept: HOME HEALTH SERVICES | Facility: HOME HEALTHCARE | Age: 89
End: 2024-02-09
Payer: MEDICARE

## 2024-02-09 VITALS
TEMPERATURE: 96.9 F | BODY MASS INDEX: 29.99 KG/M2 | RESPIRATION RATE: 18 BRPM | WEIGHT: 180 LBS | SYSTOLIC BLOOD PRESSURE: 188 MMHG | HEIGHT: 65 IN | DIASTOLIC BLOOD PRESSURE: 79 MMHG | HEART RATE: 84 BPM

## 2024-02-09 DIAGNOSIS — I10 ESSENTIAL HYPERTENSION: ICD-10-CM

## 2024-02-09 DIAGNOSIS — S81.802A OPEN WOUND OF LEFT LOWER LEG, INITIAL ENCOUNTER: ICD-10-CM

## 2024-02-09 DIAGNOSIS — S81.812A SKIN TEAR OF LEFT LOWER LEG WITHOUT COMPLICATION, INITIAL ENCOUNTER: Primary | ICD-10-CM

## 2024-02-09 PROCEDURE — 99204 OFFICE O/P NEW MOD 45 MIN: CPT | Performed by: ORTHOPAEDIC SURGERY

## 2024-02-09 PROCEDURE — 11042 DBRDMT SUBQ TIS 1ST 20SQCM/<: CPT | Performed by: ORTHOPAEDIC SURGERY

## 2024-02-09 PROCEDURE — 11045 DBRDMT SUBQ TISS EACH ADDL: CPT | Performed by: ORTHOPAEDIC SURGERY

## 2024-02-09 PROCEDURE — 99213 OFFICE O/P EST LOW 20 MIN: CPT | Performed by: ORTHOPAEDIC SURGERY

## 2024-02-09 RX ORDER — LIDOCAINE HYDROCHLORIDE 40 MG/ML
5 SOLUTION TOPICAL ONCE
Status: COMPLETED | OUTPATIENT
Start: 2024-02-09 | End: 2024-02-09

## 2024-02-09 RX ADMIN — LIDOCAINE HYDROCHLORIDE 5 ML: 40 SOLUTION TOPICAL at 09:59

## 2024-02-09 NOTE — PROGRESS NOTES
Patient ID: Diya Garcia is a 89 y.o. female Date of Birth 7/1/1929       Chief Complaint   Patient presents with    New Patient Visit     Left Leg skin tear       Allergies:  Patient has no known allergies.    Diagnosis:      Diagnosis ICD-10-CM Associated Orders   1. Skin tear of left lower leg without complication, initial encounter  S81.812A lidocaine (XYLOCAINE) 4 % topical solution 5 mL     Wound cleansing and dressings Traumatic Anterior;Distal;Left Thigh     Wound cleansing and dressings Other (comment) (unknown cause) Distal;Left Pretibial     Debridement Traumatic Anterior;Distal;Left Thigh      2. Open wound of left lower leg, initial encounter  S81.802A Ambulatory Referral to Dermatology     Wound cleansing and dressings Traumatic Anterior;Distal;Left Thigh     Wound cleansing and dressings Other (comment) (unknown cause) Distal;Left Pretibial      3. Essential hypertension  I10               Assessment and Plan :  Initial Evaluation skin tear on left medial thigh and open wound on distal aspect of LLE . Skin tear on L thigh with devitalized tissue and sanguinous drainage. No signs of infection today. Pt with elevated BP as below, most likely due to pain. Advised to f/u with PCP.   Continue Keflex until completed.   Debrided as below  Left thigh Wound management with adaptic and ABD. Apply dermagran to open wound on LLE. See wound orders below   Secure dressing with Tubifast.  No harsh cleansers such as alcohol, peroxide, or antibacterial soap, do not submerge in water such as bathtub, hot tub, swimming pool, ocean, etc.   Can cleanse with NSS at dressing changes.   Referred to Dermatology for assessment of LLE wound.  Counseled on importance of frequent elevation of leg for wound healing.  Counseled on adequate protein intake (chicken, fish, yogurt, eggs and nuts), 3-4 servings per day. Recommend Ensure, nutritional supplement twice a day to expedite wound healing. Shared information and coupon with  patient.  Counseled on smoking cessation.  Followup in  1 week(s) or call sooner with questions or concerns or any signs of infection such as redness, swelling, increased/purulent drainage, fever, chills, increased severe pain.     Subjective:   Patient is a 89 y.o. female with pmhx HTN, Breast Cancer, Arthritis, GERD, Tobacco use (smokes 3 cigarettes weekly) hearing loss, visual impairment, macular degeneration, h/o R foot gout, bifascicular block who presents for initial eval of open traumatic wound on Left thigh which has been present since 2/6/24 after the caregiver tripped over the patient's walker and and accidentally grabbed the patient's left medial thigh causing a large skin tear.  Pt was seen and treated at [facility] [place]. Since then pt was started on a 7 day course of Keflex. Pt has been applying Xeroform on wound bed. Does not have an odor. No diabetes.  No ETOH or drug use.  Pt denies any sob, fatigue, N/V, CP, fever or chills.    Pt is accompanied by her caregiver who is actively involved in her care.      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    knee & hip    KNEE SURGERY       No family history on file.   Social History     Socioeconomic History    Marital status:      Spouse name: None    Number of children: None    Years of education: None    Highest education level: None   Occupational  History    None   Tobacco Use    Smoking status: Some Days     Current packs/day: 0.25     Average packs/day: 0.3 packs/day for 20.0 years (5.0 ttl pk-yrs)     Types: Cigarettes    Smokeless tobacco: Never   Vaping Use    Vaping status: Never Used   Substance and Sexual Activity    Alcohol use: Yes     Alcohol/week: 4.0 standard drinks of alcohol     Types: 4 Standard drinks or equivalent per week    Drug use: Never    Sexual activity: Not Currently     Birth control/protection: Post-menopausal   Other Topics Concern    None   Social History Narrative    None     Social Determinants of Health     Financial Resource Strain: Low Risk  (10/9/2023)    Overall Financial Resource Strain (CARDIA)     Difficulty of Paying Living Expenses: Not hard at all   Food Insecurity: Not on file   Transportation Needs: No Transportation Needs (10/9/2023)    PRAPARE - Transportation     Lack of Transportation (Medical): No     Lack of Transportation (Non-Medical): No   Physical Activity: Not on file   Stress: Not on file   Social Connections: Not on file   Intimate Partner Violence: Not on file   Housing Stability: Not on file        Current Outpatient Medications:     acetaminophen (TYLENOL) 500 mg tablet, Take 1 tablet (500 mg total) by mouth every 6 (six) hours as needed for mild pain, Disp: 30 tablet, Rfl: 0    cephalexin (Keflex) 500 mg capsule, Take 1 capsule (500 mg total) by mouth 4 (four) times a day for 7 days, Disp: 28 capsule, Rfl: 0    enalapril (VASOTEC) 10 mg tablet, Take 1 tablet (10 mg total) by mouth daily, Disp: 90 tablet, Rfl: 3    escitalopram (LEXAPRO) 5 mg tablet, Take 1 tablet (5 mg total) by mouth daily, Disp: 30 tablet, Rfl: 3    furosemide (LASIX) 20 mg tablet, Take one tab po every other day, Disp: 45 tablet, Rfl: 1    Multiple Vitamins-Minerals (PreserVision AREDS 2+Multi Vit) CAPS, Take by mouth, Disp: , Rfl:     omeprazole (PriLOSEC) 20 mg delayed release capsule, Take 1 capsule (20 mg total) by mouth  "daily before breakfast, Disp: 30 capsule, Rfl: 5    potassium chloride (MICRO-K) 10 MEQ CR capsule, Take 2 tabs po when you take Furosemide., Disp: 180 capsule, Rfl: 0  No current facility-administered medications for this visit.    Review of Systems   Constitutional:  Negative for appetite change, chills, fatigue, fever and unexpected weight change.   HENT:  Negative for congestion, hearing loss and postnasal drip.    Respiratory:  Negative for cough and shortness of breath.    Cardiovascular:  Negative for leg swelling.   Musculoskeletal:  Positive for gait problem (ambulates with walker and wheelchair).   Skin:  Positive for wound (Left thigh and LLE). Negative for rash.   Neurological:  Negative for numbness.   Hematological:  Does not bruise/bleed easily.   Psychiatric/Behavioral: Negative.         Objective:  BP (!) 188/79   Pulse 84   Temp (!) 96.9 °F (36.1 °C)   Resp 18   Ht 5' 5\" (1.651 m)   Wt 81.6 kg (180 lb)   BMI 29.95 kg/m²   Pain Score: 10-Worst pain ever     Physical Exam  Vitals reviewed.   Constitutional:       General: She is not in acute distress.     Appearance: Normal appearance. She is well-developed and normal weight.   HENT:      Head: Normocephalic and atraumatic.   Cardiovascular:      Rate and Rhythm: Normal rate.   Pulmonary:      Effort: Pulmonary effort is normal.   Musculoskeletal:         General: No deformity.      Right lower leg: No edema.      Left lower leg: No edema.   Skin:     General: Skin is warm and dry.      Findings: Wound (left thigh and LLE) present.             Comments: 1. Open skin laceration with devitalized tissue,on wound edges, beefy red wound bed and sanguinous drainage.  2. Serosanguinous drainage on pink granulated wound bed. With dry blood on wound edge. See wound assessment   Neurological:      General: No focal deficit present.      Mental Status: She is alert and oriented to person, place, and time.      Gait: Gait abnormal.   Psychiatric:         " "Mood and Affect: Mood and affect normal.         Behavior: Behavior normal. Behavior is cooperative.               Wound 02/09/24 Traumatic Skin tear Thigh Anterior;Distal;Left (Active)   Wound Description Fragile;Beefy red 02/09/24 0953   Sepideh-wound Assessment Fragile;Other (Comment) 02/09/24 0953   Wound Length (cm) 15.5 cm 02/09/24 0953   Wound Width (cm) 6.5 cm 02/09/24 0953   Wound Depth (cm) 0.1 cm 02/09/24 0953   Wound Surface Area (cm^2) 100.75 cm^2 02/09/24 0953   Wound Volume (cm^3) 10.075 cm^3 02/09/24 0953   Calculated Wound Volume (cm^3) 10.08 cm^3 02/09/24 0953   Drainage Amount Large 02/09/24 0953   Drainage Description Bloody 02/09/24 0953   Non-staged Wound Description Full thickness 02/09/24 0953   Dressing Status Intact 02/09/24 0953       Wound 02/09/24 Other (comment) Pretibial Distal;Left (Active)   Wound Description Pink 02/09/24 0948   Sepideh-wound Assessment Fragile 02/09/24 0948   Wound Length (cm) 1.1 cm 02/09/24 0948   Wound Width (cm) 1.1 cm 02/09/24 0948   Wound Depth (cm) 0.1 cm 02/09/24 0948   Wound Surface Area (cm^2) 1.21 cm^2 02/09/24 0948   Wound Volume (cm^3) 0.121 cm^3 02/09/24 0948   Calculated Wound Volume (cm^3) 0.12 cm^3 02/09/24 0948   Drainage Amount Small 02/09/24 0948   Drainage Description Bloody 02/09/24 0948   Non-staged Wound Description Full thickness 02/09/24 0948   Dressing Status Other (Comment) 02/09/24 0948          Debridement   Wound 02/09/24 Traumatic Skin tear Thigh Anterior;Distal;Left    Universal Protocol:  Consent: Verbal consent obtained. Written consent obtained.  Risks and benefits: risks, benefits and alternatives were discussed  Consent given by: patient  Time out: Immediately prior to procedure a \"time out\" was called to verify the correct patient, procedure, equipment, support staff and site/side marked as required.  Patient understanding: patient states understanding of the procedure being performed  Patient identity confirmed: verbally with " patient    Debridement Details  Performed by: PA  Debridement type: surgical  Level of debridement: subcutaneous tissue  Pain control: lidocaine 4%      Post-debridement measurements  Length (cm): 15.5  Width (cm): 6.5  Depth (cm): 0.2  Percent debrided: 30%  Surface Area (cm^2): 100.75  Area Debrided (cm^2): 30.23  Volume (cm^3): 20.15    Tissue and other material debrided: dermis, epidermis and subcutaneous tissue  Devitalized tissue debrided: fibrin and necrotic debris  Instrument(s) utilized: scissors and forceps  Bleeding: small  Hemostasis obtained with: pressure  Procedural pain (0-10): 0  Post-procedural pain: 0   Response to treatment: procedure was tolerated well               Wound Instructions:  Orders Placed This Encounter   Procedures    Wound cleansing and dressings Traumatic Anterior;Distal;Left Thigh     Left Thigh Wound    Wash your hands with soap and water.  Remove old dressing, discard into plastic bag and place in trash.  Cleanse the wound with normal saline or mild soap and water  prior to applying a clean dressing. Do not use tissue or cotton balls. Do not scrub the wound. Pat dry using gauze.    Shower No.      Apply Adaptic to the Open wound.  Cover with Guaze and ABD.  Secure with Melanie and Tape.     Change dressing Daily.     The above was completed at the wound center.     Standing Status:   Future     Standing Expiration Date:   2/9/2025    Wound cleansing and dressings Other (comment) (unknown cause) Distal;Left Pretibial     Left Lower Leg Wound    Wash your hands with soap and water.  Remove old dressing, discard into plastic bag and place in trash.  Cleanse the wound with Normal Saline or Mild soap and water prior to applying a clean dressing. Do not use tissue or cotton balls. Do not scrub the wound. Pat dry using gauze.    Shower no     Apply Dermagran to the open wound.  Cover with Guaze pad.  Secure with Melanie and Tape.     Change dressing daily.     The above was completed  "today at the wound center.     Standing Status:   Future     Standing Expiration Date:   2/9/2025    Debridement Traumatic Anterior;Distal;Left Thigh     This order was created via procedure documentation    Ambulatory Referral to Dermatology     Standing Status:   Future     Standing Expiration Date:   2/9/2025     Referral Priority:   Routine     Referral Type:   Consult - AMB     Referral Reason:   Specialty Services Required     Requested Specialty:   Dermatology     Number of Visits Requested:   1     Expiration Date:   2/9/2025       Total time spent today:  45 minutes.  This includes reviewing the patient's chart, pertinent physician records Dr. Whitehead, Urgent Care, 2/6/24, Dr. Jefferson, Emergency Medicine, 2/6/24,  and Dr. Edouard, Internal Medicine, 10/9/23.      Alicia Norman PA-C, [facility]    Portions of the record may have been created with voice recognition software. Occasional wrong word or \"sound alike\" substitutions may have occurred due to the inherent limitations of voice recognition software. Read the chart carefully and recognize, using context, where substitutions have occurred.    "

## 2024-02-09 NOTE — PATIENT INSTRUCTIONS
Orders Placed This Encounter   Procedures    Wound cleansing and dressings Traumatic Anterior;Distal;Left Thigh     Left Thigh Wound    Wash your hands with soap and water.  Remove old dressing, discard into plastic bag and place in trash.  Cleanse the wound with normal saline or mild soap and water  prior to applying a clean dressing. Do not use tissue or cotton balls. Do not scrub the wound. Pat dry using gauze.    Shower No.      Apply Adaptic to the Open wound.  Cover with Guaze and ABD.  Secure with Melanie and Tape.     Change dressing Daily.     The above was completed at the wound center.     Standing Status:   Future     Standing Expiration Date:   2/9/2025    Wound cleansing and dressings Other (comment) (unknown cause) Distal;Left Pretibial     Left Lower Leg Wound    Wash your hands with soap and water.  Remove old dressing, discard into plastic bag and place in trash.  Cleanse the wound with Normal Saline or Mild soap and water prior to applying a clean dressing. Do not use tissue or cotton balls. Do not scrub the wound. Pat dry using gauze.    Shower no     Apply Dermagran to the open wound.  Cover with Guaze pad.  Secure with Melanie and Tape.     Change dressing daily.     The above was completed today at the wound center.     Standing Status:   Future     Standing Expiration Date:   2/9/2025    Ambulatory Referral to Dermatology     Standing Status:   Future     Standing Expiration Date:   2/9/2025     Referral Priority:   Routine     Referral Type:   Consult - AMB     Referral Reason:   Specialty Services Required     Requested Specialty:   Dermatology     Number of Visits Requested:   1     Expiration Date:   2/9/2025

## 2024-02-09 NOTE — TELEPHONE ENCOUNTER
Received call from patients care taker Hue asking to make a new patient appt for Open wound of left lower leg.      Patient has personal history of cancer. She is jeannine for 8/1 and also on the cancellation/wait list.      Can she be seen at AMB

## 2024-02-09 NOTE — TELEPHONE ENCOUNTER
Rec'd return call from patients caretaker stating that she was asked to return call to schedule sooner appt at [facility].    She states message was left that they could offer an appointment at the end of the month.    (Due to no documentation regarding appt) Scheduled patient for 20 minute SOC on Thursday 2/29/2024 @ [facility]. Caretaker aware of office location.    CLINICAL: If this is incorrect, please return call to caretaker to reschedule.     Thank you.

## 2024-02-10 ENCOUNTER — HOME CARE VISIT (OUTPATIENT)
Dept: HOME HEALTH SERVICES | Facility: HOME HEALTHCARE | Age: 89
End: 2024-02-10
Payer: MEDICARE

## 2024-02-10 VITALS
SYSTOLIC BLOOD PRESSURE: 120 MMHG | OXYGEN SATURATION: 95 % | TEMPERATURE: 98.2 F | DIASTOLIC BLOOD PRESSURE: 80 MMHG | RESPIRATION RATE: 20 BRPM | HEART RATE: 90 BPM

## 2024-02-10 PROCEDURE — 10330081 VN NO-PAY CLAIM PROCEDURE

## 2024-02-10 PROCEDURE — G0299 HHS/HOSPICE OF RN EA 15 MIN: HCPCS

## 2024-02-10 PROCEDURE — 400013 VN SOC

## 2024-02-10 NOTE — CASE COMMUNICATION
[facility] has admitted your patient to Home Health service with the following disciplines:      SN  Primary focus of home health care integumentary  Patient stated goals of care wound healing  Anticipated visit pattern and next visit date 2.11.24 3xwx4 w  Significant clinical findingsna  Potential barriers to goal achievement na  Other pertinent information na    Thank you for allowing us to participate in the care of your patient.

## 2024-02-11 ENCOUNTER — HOME CARE VISIT (OUTPATIENT)
Dept: HOME HEALTH SERVICES | Facility: HOME HEALTHCARE | Age: 89
End: 2024-02-11
Payer: MEDICARE

## 2024-02-11 VITALS
DIASTOLIC BLOOD PRESSURE: 78 MMHG | RESPIRATION RATE: 20 BRPM | OXYGEN SATURATION: 98 % | HEART RATE: 78 BPM | TEMPERATURE: 98.2 F | SYSTOLIC BLOOD PRESSURE: 130 MMHG

## 2024-02-11 PROCEDURE — 10330064 DRESSING, HYDROGEL 4X4 (15/BX 4BX/CS)

## 2024-02-11 PROCEDURE — 10330064 CLEANSER, WND SALINE WASH STR 7.1OZ (12/

## 2024-02-11 PROCEDURE — 10330064 SPONGE, GAUZE 8PLY N/S 4"X4" (200/PK 20P

## 2024-02-11 PROCEDURE — 10330064 BANDAGE, GAUZE COTTON  6PLY STR 4"X4YDS

## 2024-02-11 PROCEDURE — G0299 HHS/HOSPICE OF RN EA 15 MIN: HCPCS

## 2024-02-11 PROCEDURE — 10330064 GLOVE, EXAM VNYL MED N/S (100/BX 10BX/CS

## 2024-02-11 PROCEDURE — 10330064 TAPE, ADHSV TRANSPORE WHT 2" (6RL/BX 10B

## 2024-02-11 PROCEDURE — 10330064 BANDAGE, CNFRM 4"X4.1YDS N/S LF (12RL/BG

## 2024-02-11 PROCEDURE — 10330064 PAD, ABD 5X9" STR LF (1/PK 20PK/BX) MGM1

## 2024-02-11 NOTE — CASE COMMUNICATION
Ship to Pt. Home      Ordering MD Moraes    Wound 1 L thigh    Full x QD  Wound 2 L leg     Full x QD    Saline Spray  NOT STOCKED  [id number] 1    Gauze Kerlix (fluff roll) 4inch sterile [id number] 6  ABD 5x9 [id number] 10  Dermagran [id number] 1  Medium gloves 1

## 2024-02-11 NOTE — CASE COMMUNICATION
Ship to     [place]: [facility]    Wound 1 L thigh full x QD  Wound 2 L leg fullx QD    Saline big one 1    Gauze 4x4 N/S 200 4x4s per unit  [id number] 1  Gauze Melanie stretch roll 4inch n/s 12 rolls per unit  [id number] 1  Gauze Kerlix (fluff roll) 4inch sterile 965511 2  ABD 5x9 820545 2  Transpore white  2in [id number] 1  dermagran 1

## 2024-02-13 ENCOUNTER — HOME CARE VISIT (OUTPATIENT)
Dept: HOME HEALTH SERVICES | Facility: HOME HEALTHCARE | Age: 89
End: 2024-02-13
Payer: MEDICARE

## 2024-02-13 PROCEDURE — G0299 HHS/HOSPICE OF RN EA 15 MIN: HCPCS

## 2024-02-14 ENCOUNTER — HOME CARE VISIT (OUTPATIENT)
Dept: HOME HEALTH SERVICES | Facility: HOME HEALTHCARE | Age: 89
End: 2024-02-14
Payer: MEDICARE

## 2024-02-14 PROCEDURE — G0299 HHS/HOSPICE OF RN EA 15 MIN: HCPCS

## 2024-02-15 ENCOUNTER — HOME CARE VISIT (OUTPATIENT)
Dept: HOME HEALTH SERVICES | Facility: HOME HEALTHCARE | Age: 89
End: 2024-02-15
Payer: MEDICARE

## 2024-02-15 VITALS
DIASTOLIC BLOOD PRESSURE: 58 MMHG | RESPIRATION RATE: 20 BRPM | SYSTOLIC BLOOD PRESSURE: 110 MMHG | HEART RATE: 78 BPM | TEMPERATURE: 97.8 F | OXYGEN SATURATION: 95 %

## 2024-02-15 VITALS
TEMPERATURE: 98.1 F | HEART RATE: 73 BPM | DIASTOLIC BLOOD PRESSURE: 74 MMHG | RESPIRATION RATE: 20 BRPM | SYSTOLIC BLOOD PRESSURE: 104 MMHG | OXYGEN SATURATION: 94 %

## 2024-02-15 PROCEDURE — G0299 HHS/HOSPICE OF RN EA 15 MIN: HCPCS

## 2024-02-16 ENCOUNTER — OFFICE VISIT (OUTPATIENT)
Dept: WOUND CARE | Facility: CLINIC | Age: 89
End: 2024-02-16
Payer: MEDICARE

## 2024-02-16 ENCOUNTER — HOME CARE VISIT (OUTPATIENT)
Dept: HOME HEALTH SERVICES | Facility: HOME HEALTHCARE | Age: 89
End: 2024-02-16
Payer: MEDICARE

## 2024-02-16 VITALS
TEMPERATURE: 98.5 F | RESPIRATION RATE: 20 BRPM | OXYGEN SATURATION: 97 % | SYSTOLIC BLOOD PRESSURE: 102 MMHG | DIASTOLIC BLOOD PRESSURE: 62 MMHG | HEART RATE: 77 BPM

## 2024-02-16 VITALS
DIASTOLIC BLOOD PRESSURE: 65 MMHG | RESPIRATION RATE: 18 BRPM | HEART RATE: 75 BPM | SYSTOLIC BLOOD PRESSURE: 149 MMHG | TEMPERATURE: 96.8 F

## 2024-02-16 DIAGNOSIS — S81.812A SKIN TEAR OF LEFT LOWER LEG WITHOUT COMPLICATION, INITIAL ENCOUNTER: Primary | ICD-10-CM

## 2024-02-16 DIAGNOSIS — S81.802A OPEN WOUND OF LEFT LOWER LEG, INITIAL ENCOUNTER: ICD-10-CM

## 2024-02-16 PROCEDURE — 10330064 DRESSING, HYDROGEL 4X4 (15/BX 4BX/CS)

## 2024-02-16 PROCEDURE — 99213 OFFICE O/P EST LOW 20 MIN: CPT | Performed by: ORTHOPAEDIC SURGERY

## 2024-02-16 RX ORDER — LIDOCAINE HYDROCHLORIDE 40 MG/ML
5 SOLUTION TOPICAL ONCE
Status: COMPLETED | OUTPATIENT
Start: 2024-02-16 | End: 2024-02-16

## 2024-02-16 RX ADMIN — LIDOCAINE HYDROCHLORIDE 5 ML: 40 SOLUTION TOPICAL at 09:42

## 2024-02-16 NOTE — PATIENT INSTRUCTIONS
Orders Placed This Encounter   Procedures    Wound cleansing and dressings Traumatic Anterior;Distal;Left Thigh     Wound cleansing and dressings          Left Thigh Wound     Wash your hands with soap and water.  Remove old dressing, discard into plastic bag and place in trash.  Cleanse the wound with normal saline or mild soap and water  prior to applying a clean dressing. Do not use tissue or cotton balls. Do not scrub the wound. Pat dry using gauze.     Shower No.      Apply dermagran.  Cover with ABD.  Secure with Melanie and Tape.      Change dressing Daily.      The above was completed at the wound center.     Standing Status:   Future     Standing Expiration Date:   2/16/2025

## 2024-02-16 NOTE — CASE COMMUNICATION
Ship to   Office    Branch:  Kristin Norman    Wound 1 LLE  Full x      Wound 2 L thigh      Full xx                Saline  1 large bottle, 500cc          Dermagran [id number]    4

## 2024-02-16 NOTE — PROGRESS NOTES
Patient ID: Diya Garcia is a 89 y.o. female Date of Birth 7/1/1929       Chief Complaint   Patient presents with    Follow Up Wound Care Visit     Skin tear of left lower leg       Allergies:  Patient has no known allergies.    Diagnosis:   Diagnosis ICD-10-CM Associated Orders   1. Skin tear of left lower leg without complication, initial encounter  S81.812A lidocaine (XYLOCAINE) 4 % topical solution 5 mL     Wound cleansing and dressings Traumatic Anterior;Distal;Left Thigh      2. Open wound of left lower leg, initial encounter  S81.802A            Assessment and Plan :  Follow up evaluation of skin tear on left medial thigh and open wound on distal aspect of LLE slowly healing.   Mechanically debrided with chlorhexidine sponge.  Change Left thigh Wound management to Dermagran followed by ABD.Continue to apply dermagran to open wound on LLE. See wound orders below   Secure dressing with rolled gauze.  No harsh cleansers such as alcohol, peroxide, or antibacterial soap, do not submerge in water such as bathtub, hot tub, swimming pool, ocean, etc.   Can cleanse with NSS at dressing changes.   F/u with Dermatology for assessment of LLE wound.  Counseled on importance of frequent elevation of leg for wound healing.  Counseled on smoking cessation.  Followup in  1 week(s) or call sooner with questions or concerns or any signs of infection such as redness, swelling, increased/purulent drainage, fever, chills, increased severe pain.     Subjective:   2/9:  Patient is a 89 y.o. female with pmhx HTN, Breast Cancer, Arthritis, GERD, Tobacco use (smokes 3 cigarettes weekly) hearing loss, visual impairment, macular degeneration, h/o R foot gout, bifascicular block who presents for initial eval of open traumatic wound on Left thigh which has been present since 2/6/24 after the caregiver tripped over the patient's walker and and accidentally grabbed the patient's left medial thigh causing a large skin tear.  Pt was seen and  treated at [facility]- at [place]. Since then pt was started on a 7 day course of Keflex. Pt has been applying Xeroform on wound bed. Does not have an odor. No diabetes.  No ETOH or drug use.  Pt denies any sob, fatigue, N/V, CP, fever or chills.    Pt is accompanied by her caregiver who is actively involved in her care.    2/16: Follow up evaluation of skin tear on left medial thigh and open wound on distal aspect of LLE accompanied by her neighbor and grandson. Pt completed course of Keflex. Pt c/o adaptic sticking to wound bed therefore applied Dermagran to wound bed without complications. No increased pain or drainage. Pt denies any fever or chills.      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    knee & hip    KNEE SURGERY       No family history on file.  Social History     Socioeconomic History    Marital status:      Spouse name: None    Number of children: None    Years of education: None    Highest education level: None   Occupational History    None   Tobacco Use    Smoking status: Some Days     Current packs/day: 0.25     Average packs/day: 0.3 packs/day for 20.0 years (5.0 ttl pk-yrs)     Types: Cigarettes    Smokeless tobacco: Never   Vaping Use    Vaping status: Never Used   Substance and Sexual Activity    Alcohol use: Yes     Alcohol/week: 4.0 standard drinks of alcohol     Types: 4 Standard drinks  or equivalent per week    Drug use: Never    Sexual activity: Not Currently     Birth control/protection: Post-menopausal   Other Topics Concern    None   Social History Narrative    None     Social Determinants of Health     Financial Resource Strain: Low Risk  (10/9/2023)    Overall Financial Resource Strain (CARDIA)     Difficulty of Paying Living Expenses: Not hard at all   Food Insecurity: Not on file   Transportation Needs: No Transportation Needs (10/9/2023)    PRAPARE - Transportation     Lack of Transportation (Medical): No     Lack of Transportation (Non-Medical): No   Physical Activity: Not on file   Stress: Not on file   Social Connections: Not on file   Intimate Partner Violence: Not on file   Housing Stability: Not on file       Current Outpatient Medications:     acetaminophen (TYLENOL) 500 mg tablet, Take 1 tablet (500 mg total) by mouth every 6 (six) hours as needed for mild pain, Disp: 30 tablet, Rfl: 0    enalapril (VASOTEC) 10 mg tablet, Take 1 tablet (10 mg total) by mouth daily (Patient not taking: Reported on 2/10/2024), Disp: 90 tablet, Rfl: 3    escitalopram (LEXAPRO) 5 mg tablet, Take 1 tablet (5 mg total) by mouth daily, Disp: 30 tablet, Rfl: 3    furosemide (LASIX) 20 mg tablet, Take one tab po every other day, Disp: 45 tablet, Rfl: 1    Multiple Vitamins-Minerals (PreserVision AREDS 2+Multi Vit) CAPS, Take by mouth, Disp: , Rfl:     omeprazole (PriLOSEC) 20 mg delayed release capsule, Take 1 capsule (20 mg total) by mouth daily before breakfast, Disp: 30 capsule, Rfl: 5    potassium chloride (MICRO-K) 10 MEQ CR capsule, Take 2 tabs po when you take Furosemide., Disp: 180 capsule, Rfl: 0  No current facility-administered medications for this visit.    Review of Systems   Constitutional:  Negative for appetite change, chills, fatigue, fever and unexpected weight change.   HENT:  Negative for congestion, hearing loss and postnasal drip.    Respiratory:  Negative for cough and shortness of  breath.    Cardiovascular:  Negative for leg swelling.   Musculoskeletal:  Positive for gait problem (ambulates with walker and wheelchair).   Skin:  Positive for wound (Left thigh and LLE). Negative for rash.   Neurological:  Negative for numbness.   Hematological:  Does not bruise/bleed easily.   Psychiatric/Behavioral: Negative.           Objective:  /65   Pulse 75   Temp (!) 96.8 °F (36 °C)   Resp 18   Pain Score: 0-No pain     Physical Exam  Vitals reviewed.   Constitutional:       General: She is not in acute distress.     Appearance: Normal appearance. She is well-developed and normal weight.   HENT:      Head: Normocephalic and atraumatic.   Cardiovascular:      Rate and Rhythm: Normal rate.   Pulmonary:      Effort: Pulmonary effort is normal.   Musculoskeletal:         General: No deformity.      Right lower leg: No edema.      Left lower leg: No edema.   Skin:     General: Skin is warm and dry.      Findings: Wound (left thigh and LLE) present.             Comments: 1. Open skin laceration with beefy red granular wound bed.   2. Pink hypergranular wound with serosanguinous drainage. See wound assessment   Neurological:      General: No focal deficit present.      Mental Status: She is alert and oriented to person, place, and time.      Gait: Gait abnormal.   Psychiatric:         Mood and Affect: Mood and affect normal.         Behavior: Behavior normal. Behavior is cooperative.               Wound 02/09/24 Traumatic Skin tear Thigh Anterior;Distal;Left (Active)   Wound Image Images linked (Simultaneous filing. User may not have seen previous data.) 02/16/24 0939   Wound Description Fragile;Beefy red;Yellow 02/16/24 0939   Sepideh-wound Assessment Fragile 02/16/24 0939   Wound Length (cm) 13 cm 02/16/24 0939   Wound Width (cm) 7 cm 02/16/24 0939   Wound Depth (cm) 0.1 cm 02/16/24 0939   Wound Surface Area (cm^2) 91 cm^2 02/16/24 0939   Wound Volume (cm^3) 9.1 cm^3 02/16/24 0939   Calculated Wound  "Volume (cm^3) 9.1 cm^3 02/16/24 0939   Change in Wound Size % 9.72 02/16/24 0939   Drainage Amount Small 02/16/24 0939   Drainage Description Bloody 02/16/24 0939   Non-staged Wound Description Full thickness 02/16/24 0939   Dressing Status Intact 02/16/24 0939       Wound 02/09/24 Other (comment) Pretibial Distal;Left (Active)   Wound Image Images linked (Simultaneous filing. User may not have seen previous data.) 02/16/24 0941   Wound Description Pink;Hypergranulation 02/16/24 0941   Sepideh-wound Assessment Fragile 02/16/24 0941   Wound Length (cm) 1 cm 02/16/24 0941   Wound Width (cm) 1.2 cm 02/16/24 0941   Wound Depth (cm) 0.1 cm 02/16/24 0941   Wound Surface Area (cm^2) 1.2 cm^2 02/16/24 0941   Wound Volume (cm^3) 0.12 cm^3 02/16/24 0941   Calculated Wound Volume (cm^3) 0.12 cm^3 02/16/24 0941   Change in Wound Size % 0 02/16/24 0941   Drainage Amount Small 02/16/24 0941   Drainage Description Serosanguineous 02/16/24 0941   Non-staged Wound Description Full thickness 02/16/24 0941   Dressing Status Intact 02/16/24 0941             Wound Instructions:  Orders Placed This Encounter   Procedures    Wound cleansing and dressings Traumatic Anterior;Distal;Left Thigh     Wound cleansing and dressings          Left Thigh Wound     Wash your hands with soap and water.  Remove old dressing, discard into plastic bag and place in trash.  Cleanse the wound with normal saline or mild soap and water  prior to applying a clean dressing. Do not use tissue or cotton balls. Do not scrub the wound. Pat dry using gauze.     Shower No.      Apply dermagran.  Cover with ABD.  Secure with Melanie and Tape.      Change dressing Daily.      The above was completed at the wound center.     Standing Status:   Future     Standing Expiration Date:   2/16/2025       Alicia Norman PA-C, [facility]      Portions of the record may have been created with voice recognition software. Occasional wrong word or \"sound alike\" substitutions may have occurred " due to the inherent limitations of voice recognition software. Read the chart carefully and recognize, using context, where substitutions have occurred.

## 2024-02-17 ENCOUNTER — HOME CARE VISIT (OUTPATIENT)
Dept: HOME HEALTH SERVICES | Facility: HOME HEALTHCARE | Age: 89
End: 2024-02-17
Payer: MEDICARE

## 2024-02-17 VITALS
OXYGEN SATURATION: 96 % | TEMPERATURE: 97.7 F | HEART RATE: 80 BPM | DIASTOLIC BLOOD PRESSURE: 60 MMHG | SYSTOLIC BLOOD PRESSURE: 106 MMHG | RESPIRATION RATE: 20 BRPM

## 2024-02-17 PROCEDURE — G0299 HHS/HOSPICE OF RN EA 15 MIN: HCPCS

## 2024-02-18 ENCOUNTER — HOME CARE VISIT (OUTPATIENT)
Dept: HOME HEALTH SERVICES | Facility: HOME HEALTHCARE | Age: 89
End: 2024-02-18
Payer: MEDICARE

## 2024-02-18 VITALS
RESPIRATION RATE: 20 BRPM | HEART RATE: 80 BPM | OXYGEN SATURATION: 98 % | DIASTOLIC BLOOD PRESSURE: 60 MMHG | TEMPERATURE: 97.7 F | SYSTOLIC BLOOD PRESSURE: 120 MMHG

## 2024-02-18 PROCEDURE — 10330064 DRESSING, HYDROGEL 4X4 (15/BX 4BX/CS)

## 2024-02-18 PROCEDURE — G0299 HHS/HOSPICE OF RN EA 15 MIN: HCPCS

## 2024-02-18 NOTE — CASE COMMUNICATION
Ship toPt. Home       Ordering MD Moraes    Wound 1 L thigh full QD  Wound 2 l LE full QD        Gauze 4x4 ST [id number] 10  Gauze Kerlix (fluff roll) 4inch sterile [id number] 10  ABD 5x9 [id number] 10    Plastic 2 in NOT STOCKED 5764 1  Dermagran [id number] 10 pt uses 1 full one QD

## 2024-02-19 ENCOUNTER — HOME CARE VISIT (OUTPATIENT)
Dept: HOME HEALTH SERVICES | Facility: HOME HEALTHCARE | Age: 89
End: 2024-02-19
Payer: MEDICARE

## 2024-02-19 VITALS
RESPIRATION RATE: 28 BRPM | TEMPERATURE: 98.7 F | SYSTOLIC BLOOD PRESSURE: 120 MMHG | HEART RATE: 56 BPM | DIASTOLIC BLOOD PRESSURE: 64 MMHG | OXYGEN SATURATION: 94 %

## 2024-02-19 PROCEDURE — 10330064 BANDAGE, CNFRM 4"X4.1YDS N/S LF (12RL/BG

## 2024-02-19 PROCEDURE — 10330064

## 2024-02-19 PROCEDURE — 10330064 CLEANSER, WND SIMPLY SALINE 7.1OZ

## 2024-02-19 PROCEDURE — 10330064 PAD, ABD 5X9" STR LF (1/PK 20PK/BX) MGM1

## 2024-02-19 PROCEDURE — 10330064 SPONGE, GAUZE 12PLY STR 4"X4" (1/PK 50/B

## 2024-02-19 PROCEDURE — 10330064 BANDAGE, GAUZE COTTON  6PLY STR 4"X4YDS

## 2024-02-19 PROCEDURE — 10330064 DRESSING, HYDROGEL 4X4 (15/BX 4BX/CS)

## 2024-02-19 PROCEDURE — G0299 HHS/HOSPICE OF RN EA 15 MIN: HCPCS

## 2024-02-20 ENCOUNTER — HOME CARE VISIT (OUTPATIENT)
Dept: HOME HEALTH SERVICES | Facility: HOME HEALTHCARE | Age: 89
End: 2024-02-20
Payer: MEDICARE

## 2024-02-20 VITALS
HEART RATE: 60 BPM | TEMPERATURE: 98.8 F | OXYGEN SATURATION: 99 % | DIASTOLIC BLOOD PRESSURE: 82 MMHG | RESPIRATION RATE: 24 BRPM | SYSTOLIC BLOOD PRESSURE: 140 MMHG

## 2024-02-20 PROCEDURE — G0299 HHS/HOSPICE OF RN EA 15 MIN: HCPCS

## 2024-02-21 ENCOUNTER — HOME CARE VISIT (OUTPATIENT)
Dept: HOME HEALTH SERVICES | Facility: HOME HEALTHCARE | Age: 89
End: 2024-02-21
Payer: MEDICARE

## 2024-02-21 ENCOUNTER — OFFICE VISIT (OUTPATIENT)
Dept: INTERNAL MEDICINE CLINIC | Facility: CLINIC | Age: 89
End: 2024-02-21
Payer: MEDICARE

## 2024-02-21 VITALS
DIASTOLIC BLOOD PRESSURE: 76 MMHG | HEART RATE: 50 BPM | SYSTOLIC BLOOD PRESSURE: 138 MMHG | TEMPERATURE: 99 F | RESPIRATION RATE: 24 BRPM | OXYGEN SATURATION: 95 %

## 2024-02-21 VITALS
TEMPERATURE: 98.4 F | SYSTOLIC BLOOD PRESSURE: 118 MMHG | HEART RATE: 78 BPM | WEIGHT: 181 LBS | HEIGHT: 65 IN | BODY MASS INDEX: 30.16 KG/M2 | OXYGEN SATURATION: 95 % | RESPIRATION RATE: 18 BRPM | DIASTOLIC BLOOD PRESSURE: 74 MMHG

## 2024-02-21 DIAGNOSIS — K21.9 GASTROESOPHAGEAL REFLUX DISEASE WITHOUT ESOPHAGITIS: Primary | ICD-10-CM

## 2024-02-21 DIAGNOSIS — Z74.09 IMPAIRED FUNCTIONAL MOBILITY, BALANCE, GAIT, AND ENDURANCE: ICD-10-CM

## 2024-02-21 DIAGNOSIS — I10 PRIMARY HYPERTENSION: ICD-10-CM

## 2024-02-21 PROCEDURE — 99214 OFFICE O/P EST MOD 30 MIN: CPT | Performed by: INTERNAL MEDICINE

## 2024-02-21 PROCEDURE — G0299 HHS/HOSPICE OF RN EA 15 MIN: HCPCS

## 2024-02-21 NOTE — PROGRESS NOTES
Assessment/Plan:      HTN controlled, she is not taking enalapril regularly, for now, can continue current regimen.     Has a skin tear on her upper thigh after someone fell on her accidentally, continue f/u with wound care.    Has a non healing wound to her ankle, foot; picture available in media tab; has derm coming up.    Continue PT at home for help with mobility, wound care.    Quality Measures:      Depression Screening and Follow-up Plan: Clincally patient does not have depression. No treatment is required.     Tobacco Cessation Counseling: Tobacco cessation counseling was provided. The patient is sincerely urged to quit consumption of tobacco. She is not ready to quit tobacco.        Return in about 4 months (around 6/21/2024).    No problem-specific Assessment & Plan notes found for this encounter.       Diagnoses and all orders for this visit:    Gastroesophageal reflux disease without esophagitis    Primary hypertension    Impaired functional mobility, balance, gait, and endurance          Subjective:      Patient ID: Diya Garcia is a 89 y.o. female.    Patient is here for routine follow up, reviewed chronic medical problems, ordered labs for next visit including CBC CMP TSH A1C LIPID. Reviewed labs for this visit.      ALLERGIES:  No Known Allergies    CURRENT MEDICATIONS:    Current Outpatient Medications:     acetaminophen (TYLENOL) 500 mg tablet, Take 1 tablet (500 mg total) by mouth every 6 (six) hours as needed for mild pain, Disp: 30 tablet, Rfl: 0    escitalopram (LEXAPRO) 5 mg tablet, Take 1 tablet (5 mg total) by mouth daily, Disp: 30 tablet, Rfl: 3    furosemide (LASIX) 20 mg tablet, Take one tab po every other day, Disp: 45 tablet, Rfl: 1    Multiple Vitamins-Minerals (PreserVision AREDS 2+Multi Vit) CAPS, Take by mouth, Disp: , Rfl:     omeprazole (PriLOSEC) 20 mg delayed release capsule, Take 1 capsule (20 mg total) by mouth daily before breakfast, Disp: 30 capsule, Rfl: 5    potassium  chloride (MICRO-K) 10 MEQ CR capsule, Take 2 tabs po when you take Furosemide., Disp: 180 capsule, Rfl: 0    enalapril (VASOTEC) 10 mg tablet, Take 1 tablet (10 mg total) by mouth daily (Patient not taking: Reported on 2/10/2024), Disp: 90 tablet, Rfl: 3    ACTIVE PROBLEM LIST:  Patient Active Problem List   Diagnosis    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    knee & hip    KNEE SURGERY         FAMILY HISTORY:  History reviewed. No pertinent family history.    SOCIAL HISTORY:  Social History     Socioeconomic History    Marital status:      Spouse name: Not on file    Number of children: Not on file    Years of education: Not on file    Highest education level: Not on file   Occupational History    Not on file   Tobacco Use    Smoking status: Some Days     Current packs/day: 0.25     Average packs/day: 0.3 packs/day for 20.0 years (5.0 ttl pk-yrs)     Types: Cigarettes    Smokeless tobacco: Never   Vaping Use    Vaping status: Never Used   Substance and Sexual Activity    Alcohol use: Yes     Alcohol/week: 4.0 standard drinks of alcohol     Types: 4 Standard drinks or equivalent per week    Drug use: Never    Sexual activity: Not Currently     Birth control/protection: Post-menopausal   Other Topics Concern    Not on file   Social History Narrative    Not on file     Social Determinants of Health     Financial Resource Strain: Low Risk  (10/9/2023)    Overall Financial Resource Strain  "(CARDIA)     Difficulty of Paying Living Expenses: Not hard at all   Food Insecurity: Not on file   Transportation Needs: No Transportation Needs (10/9/2023)    PRAPARE - Transportation     Lack of Transportation (Medical): No     Lack of Transportation (Non-Medical): No   Physical Activity: Not on file   Stress: Not on file   Social Connections: Not on file   Intimate Partner Violence: Not on file   Housing Stability: Not on file       Review of Systems   Constitutional:  Negative for chills and fever.   HENT:  Negative for ear pain and sore throat.    Eyes:  Negative for pain and visual disturbance.   Respiratory:  Negative for cough and shortness of breath.    Cardiovascular:  Negative for chest pain and palpitations.   Gastrointestinal:  Negative for abdominal pain and vomiting.   Genitourinary:  Negative for dysuria and hematuria.   Musculoskeletal:  Negative for arthralgias and back pain.   Skin:  Negative for color change and rash.   Neurological:  Negative for seizures and syncope.   All other systems reviewed and are negative.        Objective:  Vitals:    02/21/24 1411   BP: 118/74   BP Location: Left arm   Patient Position: Sitting   Cuff Size: Standard   Pulse: 78   Resp: 18   Temp: 98.4 °F (36.9 °C)   TempSrc: Tympanic   SpO2: 95%   Weight: 82.1 kg (181 lb)   Height: 5' 5\" (1.651 m)     Body mass index is 30.12 kg/m².     Physical Exam  Vitals and nursing note reviewed.   Constitutional:       Appearance: Normal appearance. She is obese.   HENT:      Head: Normocephalic and atraumatic.   Cardiovascular:      Rate and Rhythm: Normal rate and regular rhythm.      Heart sounds: Normal heart sounds.   Pulmonary:      Effort: Pulmonary effort is normal.      Breath sounds: Normal breath sounds.   Musculoskeletal:         General: Normal range of motion.      Cervical back: Normal range of motion.      Right lower leg: No edema.      Left lower leg: No edema.   Skin:     General: Skin is warm and dry. "   Neurological:      General: No focal deficit present.      Mental Status: She is alert and oriented to person, place, and time. Mental status is at baseline.   Psychiatric:         Mood and Affect: Mood normal.         RESULTS:  Hemoglobin A1C   Date/Time Value Ref Range Status   04/20/2023 11:06 AM 4.8 Normal 3.8-5.6%; PreDiabetic 5.7-6.4%; Diabetic >=6.5%; Glycemic control for adults with diabetes <7.0% % Final     Cholesterol   Date/Time Value Ref Range Status   04/20/2023 11:06  See Comment mg/dL Final     Comment:     Cholesterol:         Pediatric <18 Years        Desirable          <170 mg/dL      Borderline High    170-199 mg/dL      High               >=200 mg/dL        Adult >=18 Years            Desirable        <200 mg/dL      Borderline High  200-239 mg/dL      High             >239 mg/dL       Triglycerides   Date/Time Value Ref Range Status   04/20/2023 11:06  (H) See Comment mg/dL Final     Comment:     Triglyceride:     0-9Y            <75mg/dL     10Y-17Y         <90 mg/dL       >=18Y     Normal          <150 mg/dL     Borderline High 150-199 mg/dL     High            200-499 mg/dL        Very High       >499 mg/dL    Specimen collection should occur prior to N-Acetylcysteine or Metamizole administration due to the potential for falsely depressed results.     HDL, Direct   Date/Time Value Ref Range Status   04/20/2023 11:06 AM 45 (L) >=50 mg/dL Final     LDL Calculated   Date/Time Value Ref Range Status   04/20/2023 11:06 AM 89 0 - 100 mg/dL Final     Comment:     This screening LDL is a calculated result.   It does not have the accuracy of the Direct Measured LDL in the monitoring of patients with hyperlipidemia and/or statin therapy.   Direct Measure LDL (NAR032) must be ordered separately in these patients.  LDL Cholesterol:     Optimal           <100 mg/dl     Near Optimal      100-129 mg/dl     Above Optimal       Borderline High 130-159 mg/dl       High            160-189 mg/dl        Very High       >189 mg/dl            Hemoglobin   Date/Time Value Ref Range Status   04/20/2023 11:06 AM 13.4 11.5 - 15.4 g/dL Final     Hematocrit   Date/Time Value Ref Range Status   04/20/2023 11:06 AM 41.7 34.8 - 46.1 % Final     Platelets   Date/Time Value Ref Range Status   04/20/2023 11:06  149 - 390 Thousands/uL Final     TSH 3RD GENERATON   Date/Time Value Ref Range Status   04/20/2023 11:06 AM 2.680 0.450 - 4.500 uIU/mL Final     Comment:     The recommended reference ranges for TSH during pregnancy are as follows:   First trimester 0.1 to 2.5 uIU/mL   Second trimester  0.2 to 3.0 uIU/mL   Third trimester 0.3 to 3.0 uIU/m    Note: Normal ranges may not apply to patients who are transgender, non-binary, or whose legal sex, sex at birth, and gender identity differ.  Adult TSH (3rd generation) reference range follows the recommended guidelines of the [employer] Association, January, 2020.     Sodium   Date/Time Value Ref Range Status   04/20/2023 11:06  135 - 147 mmol/L Final     BUN   Date/Time Value Ref Range Status   04/20/2023 11:06 AM 16 5 - 25 mg/dL Final     Creatinine   Date/Time Value Ref Range Status   04/20/2023 11:06 AM 1.24 0.60 - 1.30 mg/dL Final     Comment:     Standardized to IDMS reference method      In chart    This note was created with voice recognition software.  Phonic, grammatical and spelling errors may be present within the note as a result.

## 2024-02-22 ENCOUNTER — HOME CARE VISIT (OUTPATIENT)
Dept: HOME HEALTH SERVICES | Facility: HOME HEALTHCARE | Age: 89
End: 2024-02-22
Payer: MEDICARE

## 2024-02-22 VITALS
SYSTOLIC BLOOD PRESSURE: 132 MMHG | DIASTOLIC BLOOD PRESSURE: 72 MMHG | HEART RATE: 72 BPM | RESPIRATION RATE: 18 BRPM | TEMPERATURE: 98.1 F | OXYGEN SATURATION: 95 %

## 2024-02-22 PROCEDURE — G0300 HHS/HOSPICE OF LPN EA 15 MIN: HCPCS

## 2024-02-23 ENCOUNTER — OFFICE VISIT (OUTPATIENT)
Dept: WOUND CARE | Facility: CLINIC | Age: 89
End: 2024-02-23
Payer: MEDICARE

## 2024-02-23 ENCOUNTER — HOME CARE VISIT (OUTPATIENT)
Dept: HOME HEALTH SERVICES | Facility: HOME HEALTHCARE | Age: 89
End: 2024-02-23
Payer: MEDICARE

## 2024-02-23 VITALS
SYSTOLIC BLOOD PRESSURE: 148 MMHG | DIASTOLIC BLOOD PRESSURE: 78 MMHG | TEMPERATURE: 97.6 F | RESPIRATION RATE: 18 BRPM | HEART RATE: 73 BPM

## 2024-02-23 DIAGNOSIS — S81.812A SKIN TEAR OF LEFT LOWER LEG WITHOUT COMPLICATION, INITIAL ENCOUNTER: Primary | ICD-10-CM

## 2024-02-23 DIAGNOSIS — L92.9 HYPERGRANULATION: ICD-10-CM

## 2024-02-23 PROCEDURE — 17250 CHEM CAUT OF GRANLTJ TISSUE: CPT | Performed by: ORTHOPAEDIC SURGERY

## 2024-02-23 RX ORDER — LIDOCAINE HYDROCHLORIDE 40 MG/ML
5 SOLUTION TOPICAL ONCE
Status: COMPLETED | OUTPATIENT
Start: 2024-02-23 | End: 2024-02-23

## 2024-02-23 RX ADMIN — LIDOCAINE HYDROCHLORIDE 5 ML: 40 SOLUTION TOPICAL at 13:47

## 2024-02-23 NOTE — PATIENT INSTRUCTIONS
Orders Placed This Encounter   Procedures    Wound cleansing and dressings Traumatic Anterior;Distal;Left Thigh     Left Thigh Wound    Wash your hands with soap and water. Remove old dressing, discard into plastic bag and place in trash. Cleanse the wound with normal saline or mild soap and water prior to applying a clean dressing. Do not use tissue or cotton balls. Do not scrub the wound. Pat dry using gauze.     Shower No.   Apply dermagran.   Cover with ABD.   Secure with Melanie and Tape.     Change dressing Daily.     Silver Nitrate applied today. Wound will appear gray/black in appearance with similar drainage.     The above was completed at the wound center.     Standing Status:   Future     Standing Expiration Date:   2/23/2025

## 2024-02-23 NOTE — PROGRESS NOTES
Patient ID: Diya Garcia is a 89 y.o. female Date of Birth 7/1/1929       Chief Complaint   Patient presents with    Follow Up Wound Care Visit     LLE Wounds       Allergies:  Patient has no known allergies.    Diagnosis:   Diagnosis ICD-10-CM Associated Orders   1. Skin tear of left lower leg without complication, initial encounter  S81.812A lidocaine (XYLOCAINE) 4 % topical solution 5 mL     Wound cleansing and dressings Traumatic Anterior;Distal;Left Thigh     Chemical Caut Of A Wound      2. Hypergranulation  L92.9 lidocaine (XYLOCAINE) 4 % topical solution 5 mL     Wound cleansing and dressings Traumatic Anterior;Distal;Left Thigh     Chemical Caut Of A Wound           Assessment and Plan :  Follow up evaluation of skin tear on left medial thigh with hyper granu of LLE slowly healing.   Chemically cauterized.   Change Left thigh Wound management to Dermagran followed by ABD.Continue to apply dermagran to open wound on LLE. See wound orders below   Secure dressing with rolled gauze.  No harsh cleansers such as alcohol, peroxide, or antibacterial soap, do not submerge in water such as bathtub, hot tub, swimming pool, ocean, etc.   Can cleanse with NSS at dressing changes.   F/u with Dermatology for assessment of LLE wound.  Counseled on importance of frequent elevation of leg for wound healing.  Counseled on smoking cessation.  Followup in  1 week(s) or call sooner with questions or concerns or any signs of infection such as redness, swelling, increased/purulent drainage, fever, chills, increased severe pain.    Subjective:   2/9:  Patient is a 89 y.o. female with pmhx HTN, Breast Cancer, Arthritis, GERD, Tobacco use (smokes 3 cigarettes weekly) hearing loss, visual impairment, macular degeneration, h/o R foot gout, bifascicular block who presents for initial eval of open traumatic wound on Left thigh which has been present since 2/6/24 after the caregiver tripped over the patient's walker and and accidentally  grabbed the patient's left medial thigh causing a large skin tear.  Pt was seen and treated at [facility] at [place]. Since then pt was started on a 7 day course of Keflex. Pt has been applying Xeroform on wound bed. Does not have an odor. No diabetes.  No ETOH or drug use.  Pt denies any sob, fatigue, N/V, CP, fever or chills.    Pt is accompanied by her caregiver who is actively involved in her care.    2/16: Follow up evaluation of skin tear on left medial thigh and open wound on distal aspect of LLE accompanied by her neighbor and grandson. Pt completed course of Keflex. Pt c/o adaptic sticking to wound bed therefore applied Dermagran to wound bed without complications. No increased pain or drainage. Pt denies any fever or chills.    2/23:  Patient presents for follow up evaluation of skin tear on left medial thigh accompanied by her caregiver. Pt has been applying Dermagran to wound bed. No issues. Pt denies any fever or chills.          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    knee & hip    KNEE SURGERY       No family history on file.  Social History     Socioeconomic History    Marital status:      Spouse name: None    Number of children: None    Years of education: None    Highest education level: None   Occupational History    None   Tobacco Use    Smoking status: Some Days     Current packs/day: 0.25      Average packs/day: 0.3 packs/day for 20.0 years (5.0 ttl pk-yrs)     Types: Cigarettes    Smokeless tobacco: Never   Vaping Use    Vaping status: Never Used   Substance and Sexual Activity    Alcohol use: Yes     Alcohol/week: 4.0 standard drinks of alcohol     Types: 4 Standard drinks or equivalent per week    Drug use: Never    Sexual activity: Not Currently     Birth control/protection: Post-menopausal   Other Topics Concern    None   Social History Narrative    None     Social Determinants of Health     Financial Resource Strain: Low Risk  (10/9/2023)    Overall Financial Resource Strain (CARDIA)     Difficulty of Paying Living Expenses: Not hard at all   Food Insecurity: Not on file   Transportation Needs: No Transportation Needs (10/9/2023)    PRAPARE - Transportation     Lack of Transportation (Medical): No     Lack of Transportation (Non-Medical): No   Physical Activity: Not on file   Stress: Not on file   Social Connections: Not on file   Intimate Partner Violence: Not on file   Housing Stability: Not on file       Current Outpatient Medications:     acetaminophen (TYLENOL) 500 mg tablet, Take 1 tablet (500 mg total) by mouth every 6 (six) hours as needed for mild pain, Disp: 30 tablet, Rfl: 0    enalapril (VASOTEC) 10 mg tablet, Take 1 tablet (10 mg total) by mouth daily (Patient not taking: Reported on 2/10/2024), Disp: 90 tablet, Rfl: 3    escitalopram (LEXAPRO) 5 mg tablet, Take 1 tablet (5 mg total) by mouth daily, Disp: 30 tablet, Rfl: 3    furosemide (LASIX) 20 mg tablet, Take one tab po every other day, Disp: 45 tablet, Rfl: 1    Multiple Vitamins-Minerals (PreserVision AREDS 2+Multi Vit) CAPS, Take by mouth, Disp: , Rfl:     omeprazole (PriLOSEC) 20 mg delayed release capsule, Take 1 capsule (20 mg total) by mouth daily before breakfast, Disp: 30 capsule, Rfl: 5    potassium chloride (MICRO-K) 10 MEQ CR capsule, Take 2 tabs po when you take Furosemide., Disp: 180 capsule, Rfl: 0  No current  facility-administered medications for this visit.    Review of Systems   Constitutional:  Negative for appetite change, chills, fatigue, fever and unexpected weight change.   HENT:  Negative for congestion, hearing loss and postnasal drip.    Respiratory:  Negative for cough and shortness of breath.    Cardiovascular:  Negative for leg swelling.   Musculoskeletal:  Positive for gait problem (ambulates with walker and wheelchair).   Skin:  Positive for wound (Left thigh). Negative for rash.   Neurological:  Negative for numbness.   Hematological:  Does not bruise/bleed easily.   Psychiatric/Behavioral: Negative.           Objective:  /78   Pulse 73   Temp 97.6 °F (36.4 °C)   Resp 18   Pain Score:   5     Physical Exam  Vitals reviewed.   Constitutional:       General: She is not in acute distress.     Appearance: Normal appearance. She is well-developed and normal weight.   HENT:      Head: Normocephalic and atraumatic.   Cardiovascular:      Rate and Rhythm: Normal rate.   Pulmonary:      Effort: Pulmonary effort is normal.   Musculoskeletal:         General: No deformity.      Right lower leg: No edema.      Left lower leg: No edema.   Skin:     General: Skin is warm and dry.      Findings: Wound (left thigh) present.             Comments: 1. Beefry red hypergranular tissue with serosanguinous drainage. See wound assessment   Neurological:      General: No focal deficit present.      Mental Status: She is alert and oriented to person, place, and time.      Gait: Gait abnormal.   Psychiatric:         Mood and Affect: Mood and affect normal.         Behavior: Behavior normal. Behavior is cooperative.                Wound 02/09/24 Traumatic Skin tear Thigh Anterior;Distal;Left (Active)   Wound Description Fragile;Beefy red;Yellow 02/23/24 1340   Sepideh-wound Assessment Fragile;Erythema 02/23/24 1340   Wound Length (cm) 12.2 cm 02/23/24 1340   Wound Width (cm) 6.2 cm 02/23/24 1340   Wound Depth (cm) 0.1 cm  "02/23/24 1340   Wound Surface Area (cm^2) 75.64 cm^2 02/23/24 1340   Wound Volume (cm^3) 7.564 cm^3 02/23/24 1340   Calculated Wound Volume (cm^3) 7.56 cm^3 02/23/24 1340   Change in Wound Size % 25 02/23/24 1340   Drainage Amount Moderate 02/23/24 1340   Drainage Description Serosanguineous 02/23/24 1340   Non-staged Wound Description Full thickness 02/23/24 1340   Dressing Status Intact 02/23/24 1340             Chemical caut of a wound Traumatic Anterior;Distal;Left Thigh     Date/Time  2/23/2024 1:30 PM     Performed by  Alicia Norman PA-C   Authorized by  Alicia Norman PA-C      Associated wounds:   Wound 02/09/24 Traumatic Skin tear Thigh Anterior;Distal;Left   Universal Protocol   Consent: Verbal consent obtained. Written consent obtained.  Risks and benefits: risks, benefits and alternatives were discussed  Consent given by: patient  Time out: Immediately prior to procedure a \"time out\" was called to verify the correct patient, procedure, equipment, support staff and site/side marked as required.  Patient understanding: patient states understanding of the procedure being performed  Patient identity confirmed: verbally with patient      Local anesthesia used: yes     Anesthesia   Local anesthesia used: yes  Local Anesthetic: topical anesthetic     Sedation   Patient sedated: no        Specimen: no    Culture: no   Procedure Details   Procedure Notes: After permission and placement of topical local anesthetic, 2 Silver nitrate stick(s) were used to cauterize the hypergranular tissue of the open wound.  Normal saline was used to neutralize the reaction. A dressing was applied, see orders.  Patient tolerated procedure well.      Patient tolerance: Patient tolerated the procedure well with no immediate complications                Wound Instructions:  Orders Placed This Encounter   Procedures    Wound cleansing and dressings Traumatic Anterior;Distal;Left Thigh     Left Thigh Wound    Wash your hands with soap " "and water. Remove old dressing, discard into plastic bag and place in trash. Cleanse the wound with normal saline or mild soap and water prior to applying a clean dressing. Do not use tissue or cotton balls. Do not scrub the wound. Pat dry using gauze.     Shower No.   Apply dermagran.   Cover with ABD.   Secure with Melanie and Tape.     Change dressing Daily.     Silver Nitrate applied today. Wound will appear gray/black in appearance with similar drainage.     The above was completed at the wound center.     Standing Status:   Future     Standing Expiration Date:   2/23/2025    Chemical Caut Of A Wound     This order was created via procedure documentation       Alicia Norman PA-C, [facility]      Portions of the record may have been created with voice recognition software. Occasional wrong word or \"sound alike\" substitutions may have occurred due to the inherent limitations of voice recognition software. Read the chart carefully and recognize, using context, where substitutions have occurred.    "

## 2024-02-24 ENCOUNTER — HOME CARE VISIT (OUTPATIENT)
Dept: HOME HEALTH SERVICES | Facility: HOME HEALTHCARE | Age: 89
End: 2024-02-24
Payer: MEDICARE

## 2024-02-24 NOTE — CASE COMMUNICATION
TC from caretaker taking care of Kenyon Garcia. Caretaker questioning if a nurse was coming out today to perform the woundcare. Sn checked pt's chart and informed sn that sn visits are now 3x weekly, and that the next scheduled sn visit is Monday, 2/26..  Pt's daughter was also present for the phonecare and stated that the wound center said that homecare will be continuing daily. Sn explained to the daughter that even though the woundcare i s daily, it does not mean that the nurses will be coming daily, due to someone being taught the woundcare. . Caretaker has performed the woundcare 1x before. Caretaker was a little concerned to do the woundcare due to some bloody drainage on the dressing. Sn explained to the patient that that was to be expected. SN reviewed the woundcare procedure with the caregiver and instructed her to make sure and soak the dressing for several minut es with nss to prevent adhering to the wound. Caregiver asked if she could just leave it on over the weekend to give it a chance to heal. SN instructed caregiver that the doctor has continued to order daily.  Caregiver told sn that she will attempt to perform the wound care over the weekend. Sn encouraged caregiver to call the homecare office at #[phone] if she has any problems.

## 2024-02-24 NOTE — CASE COMMUNICATION
0945...Patients caregiver, Kelli called into office regarding schedule. Wanted to see if patient was scheduled today. Notified that patient scheduled for next nursing visit on Monday 2/26/24. Caregiver reports she is able to perform wound care over weekend.

## 2024-02-26 ENCOUNTER — HOME CARE VISIT (OUTPATIENT)
Dept: HOME HEALTH SERVICES | Facility: HOME HEALTHCARE | Age: 89
End: 2024-02-26
Payer: MEDICARE

## 2024-02-26 VITALS
HEART RATE: 73 BPM | DIASTOLIC BLOOD PRESSURE: 70 MMHG | RESPIRATION RATE: 20 BRPM | SYSTOLIC BLOOD PRESSURE: 130 MMHG | TEMPERATURE: 99.3 F | OXYGEN SATURATION: 93 %

## 2024-02-26 PROCEDURE — G0299 HHS/HOSPICE OF RN EA 15 MIN: HCPCS

## 2024-02-27 ENCOUNTER — HOME CARE VISIT (OUTPATIENT)
Dept: HOME HEALTH SERVICES | Facility: HOME HEALTHCARE | Age: 89
End: 2024-02-27
Payer: MEDICARE

## 2024-02-27 VITALS
HEART RATE: 94 BPM | DIASTOLIC BLOOD PRESSURE: 70 MMHG | SYSTOLIC BLOOD PRESSURE: 128 MMHG | OXYGEN SATURATION: 95 % | RESPIRATION RATE: 28 BRPM | TEMPERATURE: 99.8 F

## 2024-02-27 PROCEDURE — G0299 HHS/HOSPICE OF RN EA 15 MIN: HCPCS

## 2024-03-01 ENCOUNTER — TELEPHONE (OUTPATIENT)
Dept: HOME HEALTH SERVICES | Facility: HOME HEALTHCARE | Age: 89
End: 2024-03-01

## 2024-03-01 ENCOUNTER — HOME CARE VISIT (OUTPATIENT)
Dept: HOME HEALTH SERVICES | Facility: HOME HEALTHCARE | Age: 89
End: 2024-03-01
Payer: MEDICARE

## 2024-03-04 ENCOUNTER — HOME CARE VISIT (OUTPATIENT)
Dept: HOME HEALTH SERVICES | Facility: HOME HEALTHCARE | Age: 89
End: 2024-03-04
Payer: MEDICARE

## 2024-03-04 VITALS
DIASTOLIC BLOOD PRESSURE: 66 MMHG | SYSTOLIC BLOOD PRESSURE: 136 MMHG | HEART RATE: 81 BPM | OXYGEN SATURATION: 95 % | TEMPERATURE: 99.4 F | RESPIRATION RATE: 24 BRPM

## 2024-03-04 PROCEDURE — G0299 HHS/HOSPICE OF RN EA 15 MIN: HCPCS

## 2024-03-06 ENCOUNTER — HOME CARE VISIT (OUTPATIENT)
Dept: HOME HEALTH SERVICES | Facility: HOME HEALTHCARE | Age: 89
End: 2024-03-06
Payer: MEDICARE

## 2024-03-06 ENCOUNTER — TELEPHONE (OUTPATIENT)
Dept: INTERNAL MEDICINE CLINIC | Facility: CLINIC | Age: 89
End: 2024-03-06

## 2024-03-06 DIAGNOSIS — R06.02 SHORTNESS OF BREATH: Primary | ICD-10-CM

## 2024-03-06 PROCEDURE — G0299 HHS/HOSPICE OF RN EA 15 MIN: HCPCS

## 2024-03-06 NOTE — TELEPHONE ENCOUNTER
Tawnya is reporting patient has a lot of wheezing that is audible and heard through stethoscope. She also has a productive cough with white sputum. Notes she has states this has been going on since beginning of February. She also gets SOB when she goes 10-20 ft and when she is anxious.     Please advise...    Call back #[phone]  Tawnya/DUSTIN Chance

## 2024-03-07 ENCOUNTER — APPOINTMENT (OUTPATIENT)
Dept: RADIOLOGY | Facility: CLINIC | Age: 89
End: 2024-03-07
Payer: MEDICARE

## 2024-03-07 ENCOUNTER — APPOINTMENT (OUTPATIENT)
Age: 89
End: 2024-03-07
Payer: MEDICARE

## 2024-03-07 VITALS
TEMPERATURE: 97.6 F | HEART RATE: 74 BPM | SYSTOLIC BLOOD PRESSURE: 112 MMHG | RESPIRATION RATE: 20 BRPM | OXYGEN SATURATION: 96 % | DIASTOLIC BLOOD PRESSURE: 60 MMHG

## 2024-03-07 DIAGNOSIS — E78.2 MIXED HYPERLIPIDEMIA: ICD-10-CM

## 2024-03-07 DIAGNOSIS — I10 PRIMARY HYPERTENSION: ICD-10-CM

## 2024-03-07 DIAGNOSIS — R79.9 ABNORMAL FINDING OF BLOOD CHEMISTRY, UNSPECIFIED: ICD-10-CM

## 2024-03-07 DIAGNOSIS — R06.02 SHORTNESS OF BREATH: ICD-10-CM

## 2024-03-07 LAB
ALBUMIN SERPL BCP-MCNC: 3.8 G/DL (ref 3.5–5)
ALP SERPL-CCNC: 65 U/L (ref 34–104)
ALT SERPL W P-5'-P-CCNC: 8 U/L (ref 7–52)
ANION GAP SERPL CALCULATED.3IONS-SCNC: 10 MMOL/L
AST SERPL W P-5'-P-CCNC: 20 U/L (ref 13–39)
BASOPHILS # BLD AUTO: 0.02 THOUSANDS/ÂΜL (ref 0–0.1)
BASOPHILS NFR BLD AUTO: 0 % (ref 0–1)
BILIRUB SERPL-MCNC: 0.41 MG/DL (ref 0.2–1)
BUN SERPL-MCNC: 23 MG/DL (ref 5–25)
CALCIUM SERPL-MCNC: 9.2 MG/DL (ref 8.4–10.2)
CHLORIDE SERPL-SCNC: 108 MMOL/L (ref 96–108)
CHOLEST SERPL-MCNC: 188 MG/DL
CO2 SERPL-SCNC: 25 MMOL/L (ref 21–32)
CREAT SERPL-MCNC: 1.18 MG/DL (ref 0.6–1.3)
EOSINOPHIL # BLD AUTO: 0.21 THOUSAND/ÂΜL (ref 0–0.61)
EOSINOPHIL NFR BLD AUTO: 3 % (ref 0–6)
ERYTHROCYTE [DISTWIDTH] IN BLOOD BY AUTOMATED COUNT: 14 % (ref 11.6–15.1)
EST. AVERAGE GLUCOSE BLD GHB EST-MCNC: 108 MG/DL
GFR SERPL CREATININE-BSD FRML MDRD: 39 ML/MIN/1.73SQ M
GLUCOSE P FAST SERPL-MCNC: 105 MG/DL (ref 65–99)
HBA1C MFR BLD: 5.4 %
HCT VFR BLD AUTO: 38 % (ref 34.8–46.1)
HDLC SERPL-MCNC: 50 MG/DL
HGB BLD-MCNC: 12.2 G/DL (ref 11.5–15.4)
IMM GRANULOCYTES # BLD AUTO: 0.03 THOUSAND/UL (ref 0–0.2)
IMM GRANULOCYTES NFR BLD AUTO: 0 % (ref 0–2)
LDLC SERPL CALC-MCNC: 104 MG/DL (ref 0–100)
LYMPHOCYTES # BLD AUTO: 1.49 THOUSANDS/ÂΜL (ref 0.6–4.47)
LYMPHOCYTES NFR BLD AUTO: 20 % (ref 14–44)
MCH RBC QN AUTO: 29.3 PG (ref 26.8–34.3)
MCHC RBC AUTO-ENTMCNC: 32.1 G/DL (ref 31.4–37.4)
MCV RBC AUTO: 91 FL (ref 82–98)
MONOCYTES # BLD AUTO: 0.59 THOUSAND/ÂΜL (ref 0.17–1.22)
MONOCYTES NFR BLD AUTO: 8 % (ref 4–12)
NEUTROPHILS # BLD AUTO: 5.07 THOUSANDS/ÂΜL (ref 1.85–7.62)
NEUTS SEG NFR BLD AUTO: 69 % (ref 43–75)
NRBC BLD AUTO-RTO: 0 /100 WBCS
PLATELET # BLD AUTO: 271 THOUSANDS/UL (ref 149–390)
PMV BLD AUTO: 9.4 FL (ref 8.9–12.7)
POTASSIUM SERPL-SCNC: 4.6 MMOL/L (ref 3.5–5.3)
PROT SERPL-MCNC: 6.5 G/DL (ref 6.4–8.4)
RBC # BLD AUTO: 4.16 MILLION/UL (ref 3.81–5.12)
SODIUM SERPL-SCNC: 143 MMOL/L (ref 135–147)
TRIGL SERPL-MCNC: 171 MG/DL
TSH SERPL DL<=0.05 MIU/L-ACNC: 2.05 UIU/ML (ref 0.45–4.5)
WBC # BLD AUTO: 7.41 THOUSAND/UL (ref 4.31–10.16)

## 2024-03-07 PROCEDURE — 85025 COMPLETE CBC W/AUTO DIFF WBC: CPT

## 2024-03-07 PROCEDURE — 80053 COMPREHEN METABOLIC PANEL: CPT

## 2024-03-07 PROCEDURE — 80061 LIPID PANEL: CPT

## 2024-03-07 PROCEDURE — 36415 COLL VENOUS BLD VENIPUNCTURE: CPT

## 2024-03-07 PROCEDURE — 84443 ASSAY THYROID STIM HORMONE: CPT

## 2024-03-07 PROCEDURE — 83036 HEMOGLOBIN GLYCOSYLATED A1C: CPT

## 2024-03-07 PROCEDURE — 71046 X-RAY EXAM CHEST 2 VIEWS: CPT

## 2024-03-07 NOTE — TELEPHONE ENCOUNTER
Patients daughter, Meg, is going to reach out to caretaker to let her know about chest xray and also have her call us to schedule an appointment.

## 2024-03-08 ENCOUNTER — HOME CARE VISIT (OUTPATIENT)
Dept: HOME HEALTH SERVICES | Facility: HOME HEALTHCARE | Age: 89
End: 2024-03-08
Payer: MEDICARE

## 2024-03-08 ENCOUNTER — OFFICE VISIT (OUTPATIENT)
Dept: WOUND CARE | Facility: CLINIC | Age: 89
End: 2024-03-08
Payer: MEDICARE

## 2024-03-08 VITALS
HEART RATE: 77 BPM | RESPIRATION RATE: 18 BRPM | DIASTOLIC BLOOD PRESSURE: 66 MMHG | TEMPERATURE: 96.7 F | SYSTOLIC BLOOD PRESSURE: 145 MMHG

## 2024-03-08 DIAGNOSIS — S81.812A SKIN TEAR OF LEFT LOWER LEG WITHOUT COMPLICATION, INITIAL ENCOUNTER: Primary | ICD-10-CM

## 2024-03-08 PROCEDURE — 17250 CHEM CAUT OF GRANLTJ TISSUE: CPT | Performed by: ORTHOPAEDIC SURGERY

## 2024-03-08 PROCEDURE — 10330064

## 2024-03-08 NOTE — PATIENT INSTRUCTIONS
Orders Placed This Encounter   Procedures    Wound cleansing and dressings Traumatic Anterior;Distal;Left Thigh     Wound cleansing and dressings       Left Thigh Wound     Wash your hands with soap and water. Remove old dressing, discard into plastic bag and place in trash. Cleanse the wound with normal saline or mild soap and water prior to applying a clean dressing. Do not use tissue or cotton balls. Do not scrub the wound. Pat dry using gauze.      Shower yes on dressing change days change dressing after shower    Skin prep to godfrey wound    Apply polymen max ag  Cover with ABD.   Secure with Melanie and Tape.      Change dressing three times a week or every other day if increased drainage      Silver Nitrate applied today. Wound will appear gray/black in appearance with similar drainage.      The above was completed at the wound center.        Follow up with dermatology for lower leg wound, continue current treatment until seen     Standing Status:   Future     Standing Expiration Date:   3/8/2025

## 2024-03-08 NOTE — PROGRESS NOTES
Patient ID: Diya Garcia is a 89 y.o. female Date of Birth 7/1/1929       Chief Complaint   Patient presents with    Follow Up Wound Care Visit     Skin tear left leg       Allergies:  Patient has no known allergies.    Diagnosis:   Diagnosis ICD-10-CM Associated Orders   1. Skin tear of left lower leg without complication, initial encounter  S81.812A Wound cleansing and dressings Traumatic Anterior;Distal;Left Thigh     Chemical Caut Of A Wound           Assessment and Plan :  Follow up evaluation of skin tear on left medial thigh significantly decreased in size with hypergranular tissue    Chemically cauterized.   Change Left thigh Wound management to Polymem Ag Max. Continue to apply dermagran to open wound on LLE. See wound orders below   Secure dressing with rolled gauze.  May shower. No harsh cleansers such as alcohol, peroxide, or antibacterial soap, do not submerge in water such as bathtub, hot tub, swimming pool, ocean, etc.   Can cleanse with mild soap and water during dressing changes.   Continue frequent elevation of leg for wound healing.  Counseled on smoking cessation.  Followup in 2 week(s) or call sooner with questions or concerns or any signs of infection such as redness, swelling, increased/purulent drainage, fever, chills, increased severe pain.    Subjective:   2/9:  Patient is a 89 y.o. female with pmhx HTN, Breast Cancer, Arthritis, GERD, Tobacco use (smokes 3 cigarettes weekly) hearing loss, visual impairment, macular degeneration, h/o R foot gout, bifascicular block who presents for initial eval of open traumatic wound on Left thigh which has been present since 2/6/24 after the caregiver tripped over the patient's walker and and accidentally grabbed the patient's left medial thigh causing a large skin tear.  Pt was seen and treated at [facility] [place]. Since then pt was started on a 7 day course of Keflex. Pt has been applying Xeroform on wound bed. Does not have an odor. No diabetes.  No  ETOH or drug use.  Pt denies any sob, fatigue, N/V, CP, fever or chills.    Pt is accompanied by her caregiver who is actively involved in her care.    2/16: Follow up evaluation of skin tear on left medial thigh and open wound on distal aspect of LLE accompanied by her neighbor and grandson. Pt completed course of Keflex. Pt c/o adaptic sticking to wound bed therefore applied Dermagran to wound bed without complications. No increased pain or drainage. Pt denies any fever or chills.    2/23:  Patient presents for follow up evaluation of skin tear on left medial thigh accompanied by her caregiver. Pt has been applying Dermagran to wound bed. No issues. Pt denies any fever or chills.    3/8: Patient presents for follow up evaluation of skin tear on left medial thigh accompanied by her caregiver. No complaints. Pt states wound is not draining much. Continues to apply Dermagran to wound bed. Pt denies any fever or chills.          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    knee & hip    KNEE SURGERY       No family history on file.  Social History     Socioeconomic History    Marital status:      Spouse name: None    Number of children: None    Years of education: None    Highest education level: None   Occupational History    None   Tobacco Use    Smoking status: Some Days     Current packs/day: 0.25     Average  packs/day: 0.3 packs/day for 20.0 years (5.0 ttl pk-yrs)     Types: Cigarettes    Smokeless tobacco: Never   Vaping Use    Vaping status: Never Used   Substance and Sexual Activity    Alcohol use: Yes     Alcohol/week: 4.0 standard drinks of alcohol     Types: 4 Standard drinks or equivalent per week    Drug use: Never    Sexual activity: Not Currently     Birth control/protection: Post-menopausal   Other Topics Concern    None   Social History Narrative    None     Social Determinants of Health     Financial Resource Strain: Low Risk  (10/9/2023)    Overall Financial Resource Strain (CARDIA)     Difficulty of Paying Living Expenses: Not hard at all   Food Insecurity: Not on file   Transportation Needs: No Transportation Needs (10/9/2023)    PRAPARE - Transportation     Lack of Transportation (Medical): No     Lack of Transportation (Non-Medical): No   Physical Activity: Not on file   Stress: Not on file   Social Connections: Not on file   Intimate Partner Violence: Not on file   Housing Stability: Not on file       Current Outpatient Medications:     acetaminophen (TYLENOL) 500 mg tablet, Take 1 tablet (500 mg total) by mouth every 6 (six) hours as needed for mild pain, Disp: 30 tablet, Rfl: 0    enalapril (VASOTEC) 10 mg tablet, Take 1 tablet (10 mg total) by mouth daily (Patient not taking: Reported on 2/10/2024), Disp: 90 tablet, Rfl: 3    escitalopram (LEXAPRO) 5 mg tablet, Take 1 tablet (5 mg total) by mouth daily, Disp: 30 tablet, Rfl: 3    furosemide (LASIX) 20 mg tablet, Take one tab po every other day, Disp: 45 tablet, Rfl: 1    Multiple Vitamins-Minerals (PreserVision AREDS 2+Multi Vit) CAPS, Take by mouth, Disp: , Rfl:     omeprazole (PriLOSEC) 20 mg delayed release capsule, Take 1 capsule (20 mg total) by mouth daily before breakfast, Disp: 30 capsule, Rfl: 5    potassium chloride (MICRO-K) 10 MEQ CR capsule, Take 2 tabs po when you take Furosemide., Disp: 180 capsule, Rfl: 0    Review of Systems    Constitutional:  Negative for appetite change, chills, fatigue, fever and unexpected weight change.   HENT:  Negative for congestion, hearing loss and postnasal drip.    Respiratory:  Negative for cough and shortness of breath.    Cardiovascular:  Negative for leg swelling.   Musculoskeletal:  Positive for gait problem (ambulates with walker and wheelchair).   Skin:  Positive for wound (Left thigh and LLE). Negative for rash.   Neurological:  Negative for numbness.   Hematological:  Does not bruise/bleed easily.   Psychiatric/Behavioral: Negative.           Objective:  /66   Pulse 77   Temp (!) 96.7 °F (35.9 °C)   Resp 18   Pain Score: 0-No pain     Physical Exam  Vitals reviewed.   Constitutional:       General: She is not in acute distress.     Appearance: Normal appearance. She is well-developed and normal weight.   HENT:      Head: Normocephalic and atraumatic.   Cardiovascular:      Rate and Rhythm: Normal rate.   Pulmonary:      Effort: Pulmonary effort is normal.   Musculoskeletal:         General: No deformity.      Right lower leg: No edema.      Left lower leg: No edema.   Skin:     General: Skin is warm and dry.      Findings: Wound (left thigh) present.             Comments: 1. Beefry red hypergranular tissue with serosanguinous drainage and macerated wound edges. See wound assessment   Neurological:      General: No focal deficit present.      Mental Status: She is alert and oriented to person, place, and time.      Gait: Gait abnormal.   Psychiatric:         Mood and Affect: Mood and affect normal.         Behavior: Behavior normal. Behavior is cooperative.                  Chemical caut of a wound Traumatic Anterior;Distal;Left Thigh     Date/Time  3/8/2024 11:00 AM     Performed by  Alicia Norman PA-C   Authorized by  Alicia Norman PA-C      Associated wounds:   Wound 02/09/24 Traumatic Skin tear Thigh Anterior;Distal;Left   Universal Protocol   Consent: Verbal consent obtained. Written  "consent obtained.  Risks and benefits: risks, benefits and alternatives were discussed  Consent given by: patient  Time out: Immediately prior to procedure a \"time out\" was called to verify the correct patient, procedure, equipment, support staff and site/side marked as required.  Patient understanding: patient states understanding of the procedure being performed  Patient identity confirmed: verbally with patient      Local anesthesia used: yes     Anesthesia   Local anesthesia used: yes  Local Anesthetic: topical anesthetic     Sedation   Patient sedated: no        Specimen: no    Culture: no   Procedure Details   Procedure Notes: After permission and placement of topical local anesthetic, 1 Silver nitrate stick(s) were used to cauterize the hypergranular tissue of the open wound.  Normal saline was used to neutralize the reaction. A dressing was applied, see orders.  Patient tolerated procedure well.      Patient tolerance: Patient tolerated the procedure well with no immediate complications             Wound Instructions:  Orders Placed This Encounter   Procedures    Wound cleansing and dressings Traumatic Anterior;Distal;Left Thigh     Wound cleansing and dressings       Left Thigh Wound     Wash your hands with soap and water. Remove old dressing, discard into plastic bag and place in trash. Cleanse the wound with normal saline or mild soap and water prior to applying a clean dressing. Do not use tissue or cotton balls. Do not scrub the wound. Pat dry using gauze.      Shower yes on dressing change days change dressing after shower    Skin prep to godfrey wound    Apply polymen max ag  Cover with ABD.   Secure with Melanie and Tape.      Change dressing three times a week or every other day if increased drainage      Silver Nitrate applied today. Wound will appear gray/black in appearance with similar drainage.      The above was completed at the wound center.        Follow up with dermatology for lower leg wound, " "continue current treatment until seen     Standing Status:   Future     Standing Expiration Date:   3/8/2025    Chemical Caut Of A Wound     This order was created via procedure documentation       Alicia Norman PA-C, [facility]      Portions of the record may have been created with voice recognition software. Occasional wrong word or \"sound alike\" substitutions may have occurred due to the inherent limitations of voice recognition software. Read the chart carefully and recognize, using context, where substitutions have occurred.    "

## 2024-03-11 ENCOUNTER — HOME CARE VISIT (OUTPATIENT)
Dept: HOME HEALTH SERVICES | Facility: HOME HEALTHCARE | Age: 89
End: 2024-03-11
Payer: MEDICARE

## 2024-03-11 VITALS
DIASTOLIC BLOOD PRESSURE: 82 MMHG | RESPIRATION RATE: 24 BRPM | OXYGEN SATURATION: 94 % | TEMPERATURE: 99.5 F | HEART RATE: 81 BPM | SYSTOLIC BLOOD PRESSURE: 126 MMHG

## 2024-03-11 PROCEDURE — G0299 HHS/HOSPICE OF RN EA 15 MIN: HCPCS

## 2024-03-13 ENCOUNTER — HOME CARE VISIT (OUTPATIENT)
Dept: HOME HEALTH SERVICES | Facility: HOME HEALTHCARE | Age: 89
End: 2024-03-13
Payer: MEDICARE

## 2024-03-13 VITALS
DIASTOLIC BLOOD PRESSURE: 70 MMHG | SYSTOLIC BLOOD PRESSURE: 128 MMHG | TEMPERATURE: 96.6 F | RESPIRATION RATE: 16 BRPM | OXYGEN SATURATION: 98 % | HEART RATE: 70 BPM

## 2024-03-13 PROCEDURE — G0299 HHS/HOSPICE OF RN EA 15 MIN: HCPCS

## 2024-03-15 ENCOUNTER — HOME CARE VISIT (OUTPATIENT)
Dept: HOME HEALTH SERVICES | Facility: HOME HEALTHCARE | Age: 89
End: 2024-03-15
Payer: MEDICARE

## 2024-03-15 VITALS
TEMPERATURE: 97.6 F | SYSTOLIC BLOOD PRESSURE: 122 MMHG | HEART RATE: 56 BPM | RESPIRATION RATE: 18 BRPM | OXYGEN SATURATION: 96 % | DIASTOLIC BLOOD PRESSURE: 80 MMHG

## 2024-03-15 PROCEDURE — G0300 HHS/HOSPICE OF LPN EA 15 MIN: HCPCS

## 2024-03-17 ENCOUNTER — HOME CARE VISIT (OUTPATIENT)
Dept: HOME HEALTH SERVICES | Facility: HOME HEALTHCARE | Age: 89
End: 2024-03-17
Payer: MEDICARE

## 2024-03-17 NOTE — CASE COMMUNICATION
1125...Patient caregiver Joselyn called into office to ask if [facility] could start calling her at [phone] to schedule visits. States if anyone is coming out tomorrow, visit will have to be after 1pm as patient as appointment in AM.

## 2024-03-18 ENCOUNTER — HOME CARE VISIT (OUTPATIENT)
Dept: HOME HEALTH SERVICES | Facility: HOME HEALTHCARE | Age: 89
End: 2024-03-18
Payer: MEDICARE

## 2024-03-18 VITALS
DIASTOLIC BLOOD PRESSURE: 62 MMHG | TEMPERATURE: 99.1 F | RESPIRATION RATE: 24 BRPM | HEART RATE: 66 BPM | SYSTOLIC BLOOD PRESSURE: 108 MMHG | OXYGEN SATURATION: 96 %

## 2024-03-18 DIAGNOSIS — F32.A DEPRESSION, UNSPECIFIED DEPRESSION TYPE: ICD-10-CM

## 2024-03-18 PROCEDURE — G0299 HHS/HOSPICE OF RN EA 15 MIN: HCPCS

## 2024-03-18 RX ORDER — ESCITALOPRAM OXALATE 5 MG/1
5 TABLET ORAL DAILY
Qty: 30 TABLET | Refills: 3 | Status: SHIPPED | OUTPATIENT
Start: 2024-03-18

## 2024-03-19 ENCOUNTER — OFFICE VISIT (OUTPATIENT)
Dept: DERMATOLOGY | Facility: CLINIC | Age: 89
End: 2024-03-19
Payer: MEDICARE

## 2024-03-19 ENCOUNTER — TELEPHONE (OUTPATIENT)
Dept: DERMATOLOGY | Facility: CLINIC | Age: 89
End: 2024-03-19

## 2024-03-19 DIAGNOSIS — D48.5 NEOPLASM OF UNCERTAIN BEHAVIOR OF SKIN: ICD-10-CM

## 2024-03-19 PROCEDURE — 99204 OFFICE O/P NEW MOD 45 MIN: CPT | Performed by: DERMATOLOGY

## 2024-03-19 NOTE — PROGRESS NOTES
"[facility] Note     Patient Name: Diya Garcia  Encounter Date: 3/19/24     Have you been cared for by a [facility] Dermatologist in the last 3 years and, if so, which description applies to you?    NO.   I am considered a \"new\" patient and must complete all patient intake questions. I am FEMALE/of child-bearing potential.    REVIEW OF SYSTEMS:  Have you recently had or currently have any of the following? Recent fever or chills? No  Any non-healing wound? YES, being treated by wound care  Are you pregnant or planning to become pregnant? No  Are you currently or planning to be nursing or breast feeding? No   PAST MEDICAL HISTORY:  Have you personally ever had or currently have any of the following?  If \"YES,\" then please provide more detail. Skin cancer (such as Melanoma, Basal Cell Carcinoma, Squamous Cell Carcinoma?  No  Tuberculosis, HIV/AIDS, Hepatitis B or C: No  Radiation Treatment YES, for breast cancer   HISTORY OF IMMUNOSUPPRESSION:   Do you have a history of any of the following:  Systemic Immunosuppression such as Diabetes, Biologic or Immunotherapy, Chemotherapy, Organ Transplantation, Bone Marrow Transplantation?  No    Answering \"YES\" requires the addition of the dotphrase \"IMMUNOSUPPRESSED\" as the first diagnosis of the patient's visit.   FAMILY HISTORY:  Any \"first degree relatives\" (parent, brother, sister, or child) with the following?    Skin Cancer, Pancreatic or Other Cancer? YES, mother had cancer   PATIENT EXPERIENCE:    Do you want the Dermatologist to perform a COMPLETE skin exam today including a clinical examination under the \"bra and underwear\" areas?  NO  If necessary, do we have your permission to call and leave a detailed message on your Preferred Phone number that includes your specific medical information?  Yes      No Known Allergies   Current Outpatient Medications:     acetaminophen (TYLENOL) 500 mg tablet, Take 1 tablet (500 mg total) by mouth every 6 (six) " hours as needed for mild pain, Disp: 30 tablet, Rfl: 0    escitalopram (LEXAPRO) 5 mg tablet, Take 1 tablet (5 mg total) by mouth daily, Disp: 30 tablet, Rfl: 3    furosemide (LASIX) 20 mg tablet, Take one tab po every other day, Disp: 45 tablet, Rfl: 1    Multiple Vitamins-Minerals (PreserVision AREDS 2+Multi Vit) CAPS, Take by mouth, Disp: , Rfl:     omeprazole (PriLOSEC) 20 mg delayed release capsule, Take 1 capsule (20 mg total) by mouth daily before breakfast, Disp: 30 capsule, Rfl: 5    potassium chloride (MICRO-K) 10 MEQ CR capsule, Take 2 tabs po when you take Furosemide., Disp: 180 capsule, Rfl: 0    enalapril (VASOTEC) 10 mg tablet, Take 1 tablet (10 mg total) by mouth daily (Patient not taking: Reported on 2/10/2024), Disp: 90 tablet, Rfl: 3          Whom besides the patient is providing clinical information about today's encounter?   NO ADDITIONAL HISTORIAN (patient alone provided history)    Physical Exam and Assessment/Plan by Diagnosis:    NEOPLASM OF UNCERTAIN BEHAVIOR OF SKIN    Physical Exam:  Anatomic Location Affected:  left medial malleolus   Description of wound: eroded pink plaque, oval well defined       Additional History of Present Condition:  present a while, unsure of hx of trauma, has been seeing wound care who covers w Dermagran, denies pain or itch, has edema. Denies hx of skin cancer or family history    Assessment and Plan:  Based on a thorough discussion of this condition and the management approach to it (including a comprehensive discussion of the known risks, side effects and potential benefits of treatment), the patient (family) agrees to implement the following specific plan:    ?SCC vs nonhealing lower leg wound due to edema, pt and daughter would like to discuss biopsy and whether they would proceed w surgical excision as she is turning 89 yrs old soon and lower legs are already healing poorly   Mupirocin ointment bid for 2 weeks, fu in 1 month, if no improvement rediscuss  "biopsy for SCC      NEOPLASM OF UNCERTAIN BEHAVIOR OF SKIN    Physical Exam:  (Anatomic Location); (Size and Morphological Description); (Differential Diagnosis):  R breast; 2 cm pink scaly hemorrhagic plaque; Ddx: keratoacanthoma (scc)        Additional History of Present Condition:  present a while     Assessment and Plan:  I have discussed with the patient that a sample of skin via a \"skin biopsy” would be potentially helpful to further make a specific diagnosis under the microscope.  Pt will discuss with daughter whether or not she wants to biopsy to see if indeed a skin cancer as she is unsure whether or not she would want to pursue surgical treatment. I spoke to daughter on the phone and she is OK with plan.   FU in 1 month     "

## 2024-03-19 NOTE — TELEPHONE ENCOUNTER
F/U open wound of left lower leg// scheduled here per Dr Mccabe// Pt's daughter advised me to call -- LVM for Danni (caregiver) with date, time and location of appt, asked to callback to confirm or R/S.. JF

## 2024-03-20 ENCOUNTER — HOME CARE VISIT (OUTPATIENT)
Dept: HOME HEALTH SERVICES | Facility: HOME HEALTHCARE | Age: 89
End: 2024-03-20
Payer: MEDICARE

## 2024-03-20 VITALS
SYSTOLIC BLOOD PRESSURE: 132 MMHG | DIASTOLIC BLOOD PRESSURE: 70 MMHG | OXYGEN SATURATION: 96 % | TEMPERATURE: 98.7 F | RESPIRATION RATE: 18 BRPM | HEART RATE: 68 BPM

## 2024-03-20 PROCEDURE — G0300 HHS/HOSPICE OF LPN EA 15 MIN: HCPCS

## 2024-03-20 NOTE — TELEPHONE ENCOUNTER
Daughter Mary called to ask if it was possible to speak with a provider who saw her mother yesterday to discuss what the best options  are to move forward with open wound.     She would like to know specifically if a biopsy is needed and what would happen if left untreated ? She would like to know so she can have this discussion with the patient before a decision is made.      FYKAILEE I had a hard time searching patient's information since daughter provided wrong . She stated her  was 1943 and chart it says 1929. All other identifiers were correct (address, phone and patient's full name.) Plus emergency contact was her (daughter debby Vallejo). Please call back [phone] she said it's okay to leave a detailed VM if not able to answer since she's at work.    I also advised her for the future if patient's  is incorrect in chart she can bring an ID and insurance card to correct her files.

## 2024-03-21 NOTE — TELEPHONE ENCOUNTER
Left detailed voicemail on daughter's cell [phone] explaining that biopsy would be for diagnosis of potential skin cancer, most likely squamous cell carcinoma, which treatment would be surgical excision. Due to location of lower leg and background swelling, wound would be left to heal by secondary intention, without stitches. If it is a skin cancer that is left untreated, there is a possibility it could metastasize, although this could be a matter of years before that happens. Due to patient's advanced age, creating a large surgical wound may be of more risk/ harm than benefit to patient.

## 2024-03-22 ENCOUNTER — OFFICE VISIT (OUTPATIENT)
Dept: WOUND CARE | Facility: CLINIC | Age: 89
End: 2024-03-22
Payer: MEDICARE

## 2024-03-22 ENCOUNTER — HOME CARE VISIT (OUTPATIENT)
Dept: HOME HEALTH SERVICES | Facility: HOME HEALTHCARE | Age: 89
End: 2024-03-22
Payer: MEDICARE

## 2024-03-22 VITALS
DIASTOLIC BLOOD PRESSURE: 72 MMHG | RESPIRATION RATE: 18 BRPM | SYSTOLIC BLOOD PRESSURE: 169 MMHG | TEMPERATURE: 95.9 F | HEART RATE: 71 BPM

## 2024-03-22 DIAGNOSIS — S81.812A SKIN TEAR OF LEFT LOWER LEG WITHOUT COMPLICATION, INITIAL ENCOUNTER: Primary | ICD-10-CM

## 2024-03-22 PROCEDURE — 99212 OFFICE O/P EST SF 10 MIN: CPT | Performed by: ORTHOPAEDIC SURGERY

## 2024-03-22 RX ORDER — LIDOCAINE 40 MG/G
CREAM TOPICAL ONCE
Status: COMPLETED | OUTPATIENT
Start: 2024-03-22 | End: 2024-03-22

## 2024-03-22 RX ADMIN — LIDOCAINE: 40 CREAM TOPICAL at 11:10

## 2024-03-22 NOTE — PROGRESS NOTES
Patient ID: Diya Garcia is a 89 y.o. female Date of Birth 7/1/1929       Chief Complaint   Patient presents with    Follow Up Wound Care Visit     Left lower leg skin tear       Allergies:  Patient has no known allergies.    Diagnosis:   Diagnosis ICD-10-CM Associated Orders   1. Skin tear of left lower leg without complication, initial encounter  S81.812A lidocaine (LMX) 4 % cream     Wound cleansing and dressings Traumatic Anterior;Distal;Left Thigh           Assessment and Plan :  Follow-up evaluation of skin tear on Left medial thigh is completely.   Keep the area covered and protected for about another week   Moisturize skin daily with skin nourishing cream.   Follow up as needed or call with questions or concerns    Subjective:   2/9:  Patient is a 89 y.o. female with pmhx HTN, Breast Cancer, Arthritis, GERD, Tobacco use (smokes 3 cigarettes weekly) hearing loss, visual impairment, macular degeneration, h/o R foot gout, bifascicular block who presents for initial eval of open traumatic wound on Left thigh which has been present since 2/6/24 after the caregiver tripped over the patient's walker and and accidentally grabbed the patient's left medial thigh causing a large skin tear.  Pt was seen and treated at [facility] at [place]. Since then pt was started on a 7 day course of Keflex. Pt has been applying Xeroform on wound bed. Does not have an odor. No diabetes.  No ETOH or drug use.  Pt denies any sob, fatigue, N/V, CP, fever or chills.    Pt is accompanied by her caregiver who is actively involved in her care.    2/16: Follow up evaluation of skin tear on left medial thigh and open wound on distal aspect of LLE accompanied by her neighbor and grandson. Pt completed course of Keflex. Pt c/o adaptic sticking to wound bed therefore applied Dermagran to wound bed without complications. No increased pain or drainage. Pt denies any fever or chills.    2/23:  Patient presents for follow up evaluation of skin tear on  left medial thigh accompanied by her caregiver. Pt has been applying Dermagran to wound bed. No issues. Pt denies any fever or chills.    3/8: Patient presents for follow up evaluation of skin tear on left medial thigh accompanied by her caregiver. No complaints. Pt states wound is not draining much. Continues to apply Dermagran to wound bed. Pt denies any fever or chills.    3/22: Patient presents for follow up evaluation of skin tear on left medial thigh accompanied by her caregiver. No issues.          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    knee & hip    KNEE SURGERY       No family history on file.  Social History     Socioeconomic History    Marital status:      Spouse name: None    Number of children: None    Years of education: None    Highest education level: None   Occupational History    None   Tobacco Use    Smoking status: Some Days     Current packs/day: 0.25     Average packs/day: 0.3 packs/day for 20.0 years (5.0 ttl pk-yrs)     Types: Cigarettes    Smokeless tobacco: Never   Vaping Use    Vaping status: Never Used   Substance and Sexual Activity    Alcohol use: Yes     Alcohol/week: 4.0 standard drinks of alcohol     Types: 4 Standard drinks or equivalent per week    Drug use: Never    Sexual activity: Not Currently     Birth control/protection: Post-menopausal   Other Topics Concern    None    Social History Narrative    None     Social Determinants of Health     Financial Resource Strain: Low Risk  (10/9/2023)    Overall Financial Resource Strain (CARDIA)     Difficulty of Paying Living Expenses: Not hard at all   Food Insecurity: Not on file   Transportation Needs: No Transportation Needs (10/9/2023)    PRAPARE - Transportation     Lack of Transportation (Medical): No     Lack of Transportation (Non-Medical): No   Physical Activity: Not on file   Stress: Not on file   Social Connections: Not on file   Intimate Partner Violence: Not on file   Housing Stability: Not on file       Current Outpatient Medications:     acetaminophen (TYLENOL) 500 mg tablet, Take 1 tablet (500 mg total) by mouth every 6 (six) hours as needed for mild pain, Disp: 30 tablet, Rfl: 0    enalapril (VASOTEC) 10 mg tablet, Take 1 tablet (10 mg total) by mouth daily (Patient not taking: Reported on 2/10/2024), Disp: 90 tablet, Rfl: 3    escitalopram (LEXAPRO) 5 mg tablet, Take 1 tablet (5 mg total) by mouth daily, Disp: 30 tablet, Rfl: 3    furosemide (LASIX) 20 mg tablet, Take one tab po every other day, Disp: 45 tablet, Rfl: 1    Multiple Vitamins-Minerals (PreserVision AREDS 2+Multi Vit) CAPS, Take by mouth, Disp: , Rfl:     mupirocin (BACTROBAN) 2 % ointment, Apply topically 2 (two) times a day for 14 days, Disp: 15 g, Rfl: 0    omeprazole (PriLOSEC) 20 mg delayed release capsule, Take 1 capsule (20 mg total) by mouth daily before breakfast, Disp: 30 capsule, Rfl: 5    potassium chloride (MICRO-K) 10 MEQ CR capsule, Take 2 tabs po when you take Furosemide., Disp: 180 capsule, Rfl: 0  No current facility-administered medications for this visit.    Review of Systems   Constitutional:  Negative for appetite change, chills, fatigue, fever and unexpected weight change.   HENT:  Negative for congestion, hearing loss and postnasal drip.    Respiratory:  Negative for cough and shortness of breath.    Cardiovascular:  Negative for leg  swelling.   Musculoskeletal:  Positive for gait problem (ambulates with walker and wheelchair).   Skin:  Positive for wound (Left thigh and LLE). Negative for rash.   Neurological:  Negative for numbness.   Hematological:  Does not bruise/bleed easily.   Psychiatric/Behavioral: Negative.           Objective:  /72   Pulse 71   Temp (!) 95.9 °F (35.5 °C)   Resp 18   Pain Score: 0-No pain     Physical Exam  Vitals reviewed.   Constitutional:       General: She is not in acute distress.     Appearance: Normal appearance. She is well-developed and normal weight.   HENT:      Head: Normocephalic and atraumatic.   Cardiovascular:      Rate and Rhythm: Normal rate.   Pulmonary:      Effort: Pulmonary effort is normal.   Musculoskeletal:         General: No deformity.      Right lower leg: No edema.      Left lower leg: No edema.   Skin:     General: Skin is warm and dry.      Findings: Wound (left thigh) present.             Comments: 1. Dry healed tissue. See wound assessment   Neurological:      General: No focal deficit present.      Mental Status: She is alert and oriented to person, place, and time.      Gait: Gait abnormal.   Psychiatric:         Mood and Affect: Mood and affect normal.         Behavior: Behavior normal. Behavior is cooperative.         Wound 02/09/24 Traumatic Skin tear Thigh Anterior;Distal;Left (Active)   Wound Image Images linked 03/22/24 1108   Wound Description Eschar 03/22/24 1108   Sepideh-wound Assessment Scar Tissue;Fragile 03/22/24 1108   Wound Length (cm) 0.1 cm 03/22/24 1108   Wound Width (cm) 0.1 cm 03/22/24 1108   Wound Depth (cm) 0 cm 03/22/24 1108   Wound Surface Area (cm^2) 0.01 cm^2 03/22/24 1108   Wound Volume (cm^3) 0 cm^3 03/22/24 1108   Calculated Wound Volume (cm^3) 0 cm^3 03/22/24 1108   Change in Wound Size % 100 03/22/24 1108   Drainage Amount None 03/22/24 1108   Non-staged Wound Description Not applicable 03/22/24 1108   Dressing Status Intact 03/22/24 1108        "  Wound Instructions:  Orders Placed This Encounter   Procedures    Wound cleansing and dressings Traumatic Anterior;Distal;Left Thigh     Left Leg Wound    Your wound is healed! Congratulations!!  Keep covered for a week to protect newly healed wound.  Take off if you shower take off the dressing then reapply   Keep leg moisturized     Standing Status:   Future     Standing Expiration Date:   3/22/2025       Alicia Norman PA-C, [facility] [place]      Portions of the record may have been created with voice recognition software. Occasional wrong word or \"sound alike\" substitutions may have occurred due to the inherent limitations of voice recognition software. Read the chart carefully and recognize, using context, where substitutions have occurred.    "

## 2024-03-22 NOTE — PATIENT INSTRUCTIONS
Orders Placed This Encounter   Procedures    Wound cleansing and dressings Traumatic Anterior;Distal;Left Thigh     Left Leg Wound    Your wound is healed! Congratulations!!  Keep covered for a week to protect newly healed wound.  Take off if you shower take off the dressing then reapply   Keep leg moisturized     Standing Status:   Future     Standing Expiration Date:   3/22/2025

## 2024-03-25 ENCOUNTER — HOME CARE VISIT (OUTPATIENT)
Dept: HOME HEALTH SERVICES | Facility: HOME HEALTHCARE | Age: 89
End: 2024-03-25
Payer: MEDICARE

## 2024-03-25 VITALS
SYSTOLIC BLOOD PRESSURE: 136 MMHG | OXYGEN SATURATION: 96 % | HEART RATE: 70 BPM | TEMPERATURE: 99.3 F | DIASTOLIC BLOOD PRESSURE: 66 MMHG | RESPIRATION RATE: 28 BRPM

## 2024-03-25 PROCEDURE — G0299 HHS/HOSPICE OF RN EA 15 MIN: HCPCS

## 2024-04-16 ENCOUNTER — OFFICE VISIT (OUTPATIENT)
Dept: INTERNAL MEDICINE CLINIC | Facility: CLINIC | Age: 89
End: 2024-04-16
Payer: MEDICARE

## 2024-04-16 VITALS
OXYGEN SATURATION: 90 % | SYSTOLIC BLOOD PRESSURE: 100 MMHG | HEART RATE: 71 BPM | BODY MASS INDEX: 30.12 KG/M2 | DIASTOLIC BLOOD PRESSURE: 60 MMHG | HEIGHT: 65 IN

## 2024-04-16 DIAGNOSIS — Z74.09 IMPAIRED FUNCTIONAL MOBILITY, BALANCE, GAIT, AND ENDURANCE: ICD-10-CM

## 2024-04-16 DIAGNOSIS — R09.82 POST-NASAL DRIP: ICD-10-CM

## 2024-04-16 DIAGNOSIS — G89.29 CHRONIC LEFT-SIDED LOW BACK PAIN WITHOUT SCIATICA: ICD-10-CM

## 2024-04-16 DIAGNOSIS — M25.552 LEFT HIP PAIN: Primary | ICD-10-CM

## 2024-04-16 DIAGNOSIS — M54.50 CHRONIC LEFT-SIDED LOW BACK PAIN WITHOUT SCIATICA: ICD-10-CM

## 2024-04-16 PROCEDURE — 99213 OFFICE O/P EST LOW 20 MIN: CPT | Performed by: INTERNAL MEDICINE

## 2024-04-16 PROCEDURE — G2211 COMPLEX E/M VISIT ADD ON: HCPCS | Performed by: INTERNAL MEDICINE

## 2024-04-16 RX ORDER — FLUTICASONE PROPIONATE 50 MCG
2 SPRAY, SUSPENSION (ML) NASAL DAILY
Qty: 16 G | Refills: 5 | Status: SHIPPED | OUTPATIENT
Start: 2024-04-16

## 2024-04-16 NOTE — PROGRESS NOTES
Assessment/Plan:     Patient has seen ortho in the past for left hip pain, she had a left hip arthroplasty some years ago, reports worsening pain.  MRI had been ordered to rule out stress fracture or loosening of any hardware.  Will have him schedule MRI, will obtain a new x-ray since the last 1 was a year ago.    Quality Measures:       Depression Screening and Follow-up Plan: Clincally patient does not have depression. No treatment is required.     Tobacco Cessation Counseling: Tobacco cessation counseling was provided. The patient is sincerely urged to quit consumption of tobacco. She is not ready to quit tobacco.          Return for Next scheduled follow up.    No problem-specific Assessment & Plan notes found for this encounter.       Diagnoses and all orders for this visit:    Left hip pain  -     XR hip/pelv 2-3 vws left if performed; Future    Impaired functional mobility, balance, gait, and endurance    Chronic left-sided low back pain without sciatica    Post-nasal drip  -     fluticasone (FLONASE) 50 mcg/act nasal spray; 2 sprays into each nostril daily          Subjective:      Patient ID: Diya Garcia is a 89 y.o. female.    Here with hip pain.    Hip Pain   The incident occurred more than 1 week ago. There was no injury mechanism. The pain is present in the left hip. The pain is mild. Associated symptoms include an inability to bear weight.       ALLERGIES:  No Known Allergies    CURRENT MEDICATIONS:    Current Outpatient Medications:     acetaminophen (TYLENOL) 500 mg tablet, Take 1 tablet (500 mg total) by mouth every 6 (six) hours as needed for mild pain, Disp: 30 tablet, Rfl: 0    escitalopram (LEXAPRO) 5 mg tablet, Take 1 tablet (5 mg total) by mouth daily, Disp: 30 tablet, Rfl: 3    fluticasone (FLONASE) 50 mcg/act nasal spray, 2 sprays into each nostril daily, Disp: 16 g, Rfl: 5    furosemide (LASIX) 20 mg tablet, Take one tab po every other day, Disp: 45 tablet, Rfl: 1    Multiple  Vitamins-Minerals (PreserVision AREDS 2+Multi Vit) CAPS, Take by mouth, Disp: , Rfl:     omeprazole (PriLOSEC) 20 mg delayed release capsule, Take 1 capsule (20 mg total) by mouth daily before breakfast, Disp: 30 capsule, Rfl: 5    potassium chloride (MICRO-K) 10 MEQ CR capsule, Take 2 tabs po when you take Furosemide., Disp: 180 capsule, Rfl: 0    enalapril (VASOTEC) 10 mg tablet, Take 1 tablet (10 mg total) by mouth daily (Patient not taking: Reported on 2/10/2024), Disp: 90 tablet, Rfl: 3    mupirocin (BACTROBAN) 2 % ointment, Apply topically 2 (two) times a day for 14 days, Disp: 15 g, Rfl: 0    ACTIVE PROBLEM LIST:  Patient Active Problem List   Diagnosis    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    knee & hip    KNEE SURGERY         FAMILY HISTORY:  History reviewed. No pertinent family history.    SOCIAL HISTORY:  Social History     Socioeconomic History    Marital status:      Spouse name: Not on file    Number of children: Not on file    Years of education: Not on file    Highest education level: Not on file   Occupational History    Not on file   Tobacco Use    Smoking status: Some Days     Current packs/day: 0.25     Average packs/day: 0.3 packs/day for 20.0 years (5.0 ttl pk-yrs)     Types: Cigarettes    Smokeless tobacco: Never   Vaping Use    Vaping status: Never Used   Substance and Sexual Activity    Alcohol use: Yes     Alcohol/week: 4.0 standard drinks of alcohol     Types: 4  "Standard drinks or equivalent per week    Drug use: Never    Sexual activity: Not Currently     Birth control/protection: Post-menopausal   Other Topics Concern    Not on file   Social History Narrative    Not on file     Social Determinants of Health     Financial Resource Strain: Low Risk  (10/9/2023)    Overall Financial Resource Strain (CARDIA)     Difficulty of Paying Living Expenses: Not hard at all   Food Insecurity: Not on file   Transportation Needs: No Transportation Needs (10/9/2023)    PRAPARE - Transportation     Lack of Transportation (Medical): No     Lack of Transportation (Non-Medical): No   Physical Activity: Not on file   Stress: Not on file   Social Connections: Not on file   Intimate Partner Violence: Not on file   Housing Stability: Not on file       Review of Systems   Constitutional:  Negative for chills and fever.   HENT:  Negative for ear pain and sore throat.    Eyes:  Negative for pain and visual disturbance.   Respiratory:  Negative for cough and shortness of breath.    Cardiovascular:  Negative for chest pain and palpitations.   Gastrointestinal:  Negative for abdominal pain and vomiting.   Genitourinary:  Negative for dysuria and hematuria.   Musculoskeletal:  Positive for arthralgias. Negative for back pain.        Left hip pain   Skin:  Negative for color change and rash.   Neurological:  Negative for seizures and syncope.   All other systems reviewed and are negative.        Objective:  Vitals:    04/16/24 1029   BP: 100/60   BP Location: Right arm   Patient Position: Sitting   Cuff Size: Standard   Pulse: 71   SpO2: 90%   Height: 5' 5\" (1.651 m)     Body mass index is 30.12 kg/m².     Physical Exam  Vitals and nursing note reviewed.   Constitutional:       Appearance: Normal appearance.   HENT:      Head: Normocephalic and atraumatic.   Cardiovascular:      Rate and Rhythm: Normal rate.   Pulmonary:      Effort: Pulmonary effort is normal.   Musculoskeletal:         General: Normal " range of motion.      Cervical back: Normal range of motion.      Right lower leg: No edema.      Left lower leg: No edema.   Skin:     General: Skin is warm and dry.   Neurological:      General: No focal deficit present.      Mental Status: She is alert and oriented to person, place, and time. Mental status is at baseline.      Gait: Gait abnormal.   Psychiatric:         Mood and Affect: Mood normal.           RESULTS:  Hemoglobin A1C   Date/Time Value Ref Range Status   03/07/2024 10:04 AM 5.4 Normal 4.0-5.6%; PreDiabetic 5.7-6.4%; Diabetic >=6.5%; Glycemic control for adults with diabetes <7.0% % Final     Cholesterol   Date/Time Value Ref Range Status   03/07/2024 10:04  See Comment mg/dL Final     Comment:     Cholesterol:         Pediatric <18 Years        Desirable          <170 mg/dL      Borderline High    170-199 mg/dL      High               >=200 mg/dL        Adult >=18 Years            Desirable        <200 mg/dL      Borderline High  200-239 mg/dL      High             >239 mg/dL       Triglycerides   Date/Time Value Ref Range Status   03/07/2024 10:04  (H) See Comment mg/dL Final     Comment:     Triglyceride:     0-9Y            <75mg/dL     10Y-17Y         <90 mg/dL       >=18Y     Normal          <150 mg/dL     Borderline High 150-199 mg/dL     High            200-499 mg/dL        Very High       >499 mg/dL    Specimen collection should occur prior to Metamizole administration due to the potential for falsely depressed results.     HDL, Direct   Date/Time Value Ref Range Status   03/07/2024 10:04 AM 50 >=50 mg/dL Final     LDL Calculated   Date/Time Value Ref Range Status   03/07/2024 10:04  (H) 0 - 100 mg/dL Final     Comment:     LDL Cholesterol:     Optimal           <100 mg/dl     Near Optimal      100-129 mg/dl     Above Optimal       Borderline High 130-159 mg/dl       High            160-189 mg/dl       Very High       >189 mg/dl         This screening LDL is a calculated  result.   It does not have the accuracy of the Direct Measured LDL in the monitoring of patients with hyperlipidemia and/or statin therapy.   Direct Measure LDL ([id number]) must be ordered separately in these patients.     Hemoglobin   Date/Time Value Ref Range Status   03/07/2024 10:04 AM 12.2 11.5 - 15.4 g/dL Final     Hematocrit   Date/Time Value Ref Range Status   03/07/2024 10:04 AM 38.0 34.8 - 46.1 % Final     Platelets   Date/Time Value Ref Range Status   03/07/2024 10:04  149 - 390 Thousands/uL Final     TSH 3RD GENERATON   Date/Time Value Ref Range Status   03/07/2024 10:04 AM 2.046 0.450 - 4.500 uIU/mL Final     Comment:     The recommended reference ranges for TSH during pregnancy are as follows:   First trimester 0.100 to 2.500 uIU/mL   Second trimester  0.200 to 3.000 uIU/mL   Third trimester 0.300 to 3.000 uIU/m    Note: Normal ranges may not apply to patients who are transgender, non-binary, or whose legal sex, sex at birth, and gender identity differ.  Adult TSH (3rd generation) reference range follows the recommended guidelines of the [employer] Association, January, 2020.     Sodium   Date/Time Value Ref Range Status   03/07/2024 10:04  135 - 147 mmol/L Final     BUN   Date/Time Value Ref Range Status   03/07/2024 10:04 AM 23 5 - 25 mg/dL Final     Creatinine   Date/Time Value Ref Range Status   03/07/2024 10:04 AM 1.18 0.60 - 1.30 mg/dL Final     Comment:     Standardized to IDMS reference method      In chart    This note was created with voice recognition software.  Phonic, grammatical and spelling errors may be present within the note as a result.

## 2024-04-17 ENCOUNTER — APPOINTMENT (OUTPATIENT)
Dept: RADIOLOGY | Facility: CLINIC | Age: 89
End: 2024-04-17
Payer: MEDICARE

## 2024-04-17 DIAGNOSIS — M25.552 LEFT HIP PAIN: ICD-10-CM

## 2024-04-17 PROCEDURE — 73502 X-RAY EXAM HIP UNI 2-3 VIEWS: CPT

## 2024-04-22 ENCOUNTER — TELEPHONE (OUTPATIENT)
Age: 89
End: 2024-04-22

## 2024-04-22 NOTE — TELEPHONE ENCOUNTER
Caller: francisca caretaker    Doctor: keo    Reason for call: pt is having pain in her buttock again and would like to get another injection.    Call back#: [phone]

## 2024-04-22 NOTE — TELEPHONE ENCOUNTER
Pt was a direct referral from ortho. She will need to call ortho for f/u prior to scheduling another injection.

## 2024-05-02 ENCOUNTER — OFFICE VISIT (OUTPATIENT)
Dept: OBGYN CLINIC | Facility: CLINIC | Age: 89
End: 2024-05-02
Payer: MEDICARE

## 2024-05-02 VITALS
HEIGHT: 65 IN | HEART RATE: 83 BPM | BODY MASS INDEX: 30.16 KG/M2 | SYSTOLIC BLOOD PRESSURE: 126 MMHG | WEIGHT: 181 LBS | DIASTOLIC BLOOD PRESSURE: 76 MMHG

## 2024-05-02 DIAGNOSIS — M17.12 PRIMARY OSTEOARTHRITIS OF LEFT KNEE: Primary | ICD-10-CM

## 2024-05-02 PROCEDURE — 20610 DRAIN/INJ JOINT/BURSA W/O US: CPT | Performed by: FAMILY MEDICINE

## 2024-05-02 PROCEDURE — 99214 OFFICE O/P EST MOD 30 MIN: CPT | Performed by: FAMILY MEDICINE

## 2024-05-02 RX ORDER — BUPIVACAINE HYDROCHLORIDE 2.5 MG/ML
1 INJECTION, SOLUTION INFILTRATION; PERINEURAL
Status: COMPLETED | OUTPATIENT
Start: 2024-05-02 | End: 2024-05-02

## 2024-05-02 RX ORDER — MELOXICAM 15 MG/1
15 TABLET ORAL DAILY
Qty: 30 TABLET | Refills: 1 | Status: SHIPPED | OUTPATIENT
Start: 2024-05-02

## 2024-05-02 RX ORDER — BUPIVACAINE HYDROCHLORIDE 2.5 MG/ML
4 INJECTION, SOLUTION INFILTRATION; PERINEURAL
Status: COMPLETED | OUTPATIENT
Start: 2024-05-02 | End: 2024-05-02

## 2024-05-02 RX ORDER — METHYLPREDNISOLONE ACETATE 40 MG/ML
2 INJECTION, SUSPENSION INTRA-ARTICULAR; INTRALESIONAL; INTRAMUSCULAR; SOFT TISSUE
Status: COMPLETED | OUTPATIENT
Start: 2024-05-02 | End: 2024-05-02

## 2024-05-02 RX ADMIN — METHYLPREDNISOLONE ACETATE 2 ML: 40 INJECTION, SUSPENSION INTRA-ARTICULAR; INTRALESIONAL; INTRAMUSCULAR; SOFT TISSUE at 14:30

## 2024-05-02 RX ADMIN — BUPIVACAINE HYDROCHLORIDE 4 ML: 2.5 INJECTION, SOLUTION INFILTRATION; PERINEURAL at 14:30

## 2024-05-02 RX ADMIN — BUPIVACAINE HYDROCHLORIDE 1 ML: 2.5 INJECTION, SOLUTION INFILTRATION; PERINEURAL at 14:30

## 2024-05-02 NOTE — PROGRESS NOTES
Assessment/Plan:  Assessment/Plan   Diagnoses and all orders for this visit:    Primary osteoarthritis of left knee  -     meloxicam (Mobic) 15 mg tablet; Take 1 tablet (15 mg total) by mouth daily  -     Large joint arthrocentesis: L knee  -     Injection Procedure Prior Authorization; Future          89-year-old female with osteoarthritis of the left knee with left knee pain many years duration.  Discussed the patient physical exam, impression, and plan.  Physical exam left knee noted for generalized swelling.  She has generalized edema left lower leg.  Left knee noted for genu valgum.  She has extension to -5 degrees  Clinical impression is that she has symptoms from degenerative changes.  She had greatly for symptoms lasting more than 6 months from visco injection so I offered patient repeat Visco injections which she agreed.  Will request for 1 shot visco injection of the left knee.  In the interim I offered steroid injection for more immediate relief.  I administered mixture 3 cc 0.25% bupivacaine and 2 cc Depo-Medrol to the left knee without complication.  She may take meloxicam on an as-needed basis.  She will return for Visco injection once approved.            Patient ID: Diya Garcia is a 89 y.o. female.  Chief Complaint   Patient presents with    Left Knee - Follow-up        89-year-old female with osteoarthritis of the left knee following up for left knee pain many years duration.  She was last seen by me 10.5 months ago at which point she was given Synvisc 1 injection.  She reports that following injection she has significant improvement and was doing well until about 1 month ago.  Since that time she has been experiencing pain described generalized to the knee, worse with movement and ambulating, associated with swelling, rated as 8-9 out of 10, and improved with resting.  She resumed taking meloxicam and gabapentin which have been helping with her symptoms.      Knee Pain  This is a chronic problem.  "The current episode started more than 1 year ago. The problem occurs daily. The problem has been gradually worsening. Associated symptoms include arthralgias, joint swelling and weakness. Pertinent negatives include no numbness. The symptoms are aggravated by standing, walking and bending. She has tried rest, position changes and NSAIDs for the symptoms. The treatment provided mild relief.               Review of Systems   Musculoskeletal:  Positive for arthralgias and joint swelling.   Neurological:  Positive for weakness. Negative for numbness.       Objective:  Vitals:    05/02/24 1408   BP: 126/76   Pulse: 83   Weight: 82.1 kg (181 lb)   Height: 5' 5\" (1.651 m)      Left Knee Exam     Range of Motion   Extension:  -5     Other   Swelling: moderate    Comments:  Genu valgum            Physical Exam  Vitals and nursing note reviewed.   Constitutional:       Appearance: Normal appearance. She is well-developed. She is not ill-appearing or diaphoretic.   HENT:      Head: Normocephalic and atraumatic.      Right Ear: External ear normal.      Left Ear: External ear normal.   Eyes:      Conjunctiva/sclera: Conjunctivae normal.   Neck:      Trachea: No tracheal deviation.   Cardiovascular:      Rate and Rhythm: Normal rate.   Pulmonary:      Effort: Pulmonary effort is normal. No respiratory distress.   Abdominal:      General: There is no distension.   Musculoskeletal:         General: Swelling and tenderness present.      Left lower leg: Edema present.   Skin:     General: Skin is warm and dry.      Coloration: Skin is not jaundiced or pale.   Neurological:      Mental Status: She is alert and oriented to person, place, and time.   Psychiatric:         Mood and Affect: Mood normal.         Behavior: Behavior normal.         Thought Content: Thought content normal.         Judgment: Judgment normal.             Large joint arthrocentesis: L knee  Universal Protocol:  Consent: Verbal consent obtained.  Risks and " "benefits: risks, benefits and alternatives were discussed  Consent given by: patient  Time out: Immediately prior to procedure a \"time out\" was called to verify the correct patient, procedure, equipment, support staff and site/side marked as required.  Patient understanding: patient states understanding of the procedure being performed  Patient consent: the patient's understanding of the procedure matches consent given  Procedure consent: procedure consent matches procedure scheduled  Relevant documents: relevant documents present and verified  Test results: test results available and properly labeled  Site marked: the operative site was marked  Radiology Images displayed and confirmed. If images not available, report reviewed: imaging studies available  Required items: required blood products, implants, devices, and special equipment available  Patient identity confirmed: verbally with patient  Supporting Documentation  Indications: pain   Procedure Details  Location: knee - L knee  Preparation: Patient was prepped and draped in the usual sterile fashion  Needle gauge: 21G 2\"  Ultrasound guidance: no  Approach: anteromedial  Medications administered: 4 mL bupivacaine 0.25 %; 1 mL bupivacaine 0.25 %; 2 mL methylPREDNISolone acetate 40 mg/mL    Patient tolerance: patient tolerated the procedure well with no immediate complications  Dressing:  Sterile dressing applied        More than 31 minutes were spent with review of patient's chart, obtaining history from patient, examining patient, and discussing and implementing treatment plan.      "

## 2024-05-07 ENCOUNTER — TELEPHONE (OUTPATIENT)
Age: 89
End: 2024-05-07

## 2024-05-07 NOTE — TELEPHONE ENCOUNTER
Left a detailed message for daughterMeg. Script signed and placed up front for scanning and for patient .

## 2024-05-07 NOTE — TELEPHONE ENCOUNTER
Pt's daughter is requesting machine that inflates the compression boots or could she get a prescription for the boots not such if insurance will cover.

## 2024-05-21 ENCOUNTER — PROCEDURE VISIT (OUTPATIENT)
Dept: OBGYN CLINIC | Facility: CLINIC | Age: 89
End: 2024-05-21
Payer: MEDICARE

## 2024-05-21 VITALS
SYSTOLIC BLOOD PRESSURE: 144 MMHG | WEIGHT: 181 LBS | DIASTOLIC BLOOD PRESSURE: 76 MMHG | BODY MASS INDEX: 30.16 KG/M2 | HEIGHT: 65 IN | HEART RATE: 62 BPM

## 2024-05-21 DIAGNOSIS — M17.12 PRIMARY OSTEOARTHRITIS OF LEFT KNEE: Primary | ICD-10-CM

## 2024-05-21 PROCEDURE — 20610 DRAIN/INJ JOINT/BURSA W/O US: CPT | Performed by: FAMILY MEDICINE

## 2024-05-21 RX ORDER — BUPIVACAINE HYDROCHLORIDE 2.5 MG/ML
2 INJECTION, SOLUTION INFILTRATION; PERINEURAL
Status: COMPLETED | OUTPATIENT
Start: 2024-05-21 | End: 2024-05-21

## 2024-05-21 RX ORDER — BUPIVACAINE HYDROCHLORIDE 2.5 MG/ML
1 INJECTION, SOLUTION INFILTRATION; PERINEURAL
Status: COMPLETED | OUTPATIENT
Start: 2024-05-21 | End: 2024-05-21

## 2024-05-21 RX ADMIN — BUPIVACAINE HYDROCHLORIDE 2 ML: 2.5 INJECTION, SOLUTION INFILTRATION; PERINEURAL at 13:30

## 2024-05-21 RX ADMIN — BUPIVACAINE HYDROCHLORIDE 1 ML: 2.5 INJECTION, SOLUTION INFILTRATION; PERINEURAL at 13:30

## 2024-05-21 NOTE — PROGRESS NOTES
"Assessment/Plan:  Assessment & Plan   Diagnoses and all orders for this visit:    Primary osteoarthritis of left knee  -     Large joint arthrocentesis: L knee      89-year-old female with osteoarthritis of the left knee with left knee pain many years duration.  Clinical impression is that she has symptoms from degenerative changes.  She agreed to proceed with Visco injection.  I administered Synvisc 1 to the left knee without complication.  She will follow-up as needed.            Subjective:   Patient ID: Diya Garcia is a 89 y.o. female.  Chief Complaint   Patient presents with    Left Knee - Follow-up        89-year-old female with osteoarthritis of the left knee following for left knee pain many years duration.  She was last seen by me 3 weeks ago at which point she received steroid injection and we requested for visco injection.  She presents today for Visco injection of the left knee.  Steroid injection helped decrease intensity of pain.  She has pain described as generalized to the knee, nonradiating, worse with prolonged standing and ambulation, associated with swelling, and improved with resting.    Knee Pain  This is a chronic problem. The current episode started more than 1 year ago. The problem occurs daily. The problem has been waxing and waning. Associated symptoms include arthralgias, joint swelling and weakness. Pertinent negatives include no numbness. The symptoms are aggravated by standing and walking. She has tried rest and position changes (Steroid injection) for the symptoms. The treatment provided moderate relief.               Review of Systems   Musculoskeletal:  Positive for arthralgias and joint swelling.   Neurological:  Positive for weakness. Negative for numbness.       Objective:  Vitals:    05/21/24 1328   BP: 144/76   Pulse: 62   Weight: 82.1 kg (181 lb)   Height: 5' 5\" (1.651 m)      Left Knee Exam     Other   Swelling: mild            Physical Exam  Vitals and nursing note " "reviewed.   Constitutional:       Appearance: Normal appearance. She is well-developed. She is not ill-appearing or diaphoretic.   HENT:      Head: Normocephalic and atraumatic.      Right Ear: External ear normal.      Left Ear: External ear normal.   Eyes:      Conjunctiva/sclera: Conjunctivae normal.   Neck:      Trachea: No tracheal deviation.   Cardiovascular:      Rate and Rhythm: Normal rate.   Pulmonary:      Effort: Pulmonary effort is normal. No respiratory distress.   Abdominal:      General: There is no distension.   Musculoskeletal:         General: Swelling present.   Skin:     General: Skin is warm and dry.      Coloration: Skin is not jaundiced or pale.   Neurological:      Mental Status: She is alert and oriented to person, place, and time.   Psychiatric:         Mood and Affect: Mood normal.         Behavior: Behavior normal.         Thought Content: Thought content normal.         Judgment: Judgment normal.         Large joint arthrocentesis: L knee  Universal Protocol:  Consent: Verbal consent obtained.  Risks and benefits: risks, benefits and alternatives were discussed  Consent given by: patient  Time out: Immediately prior to procedure a \"time out\" was called to verify the correct patient, procedure, equipment, support staff and site/side marked as required.  Patient understanding: patient states understanding of the procedure being performed  Patient consent: the patient's understanding of the procedure matches consent given  Procedure consent: procedure consent matches procedure scheduled  Relevant documents: relevant documents present and verified  Test results: test results available and properly labeled  Site marked: the operative site was marked  Radiology Images displayed and confirmed. If images not available, report reviewed: imaging studies available  Required items: required blood products, implants, devices, and special equipment available  Patient identity confirmed: verbally with " "patient  Supporting Documentation  Indications: pain   Procedure Details  Location: knee - L knee  Preparation: Patient was prepped and draped in the usual sterile fashion  Needle gauge: 21G 2\"  Approach: anteromedial  Medications administered: 2 mL bupivacaine 0.25 %; 1 mL bupivacaine 0.25 %; 48 mg hylan 48 MG/6ML    Patient tolerance: patient tolerated the procedure well with no immediate complications  Dressing:  Sterile dressing applied                  "

## 2024-07-08 DIAGNOSIS — F32.A DEPRESSION, UNSPECIFIED DEPRESSION TYPE: ICD-10-CM

## 2024-07-08 RX ORDER — ESCITALOPRAM OXALATE 5 MG/1
5 TABLET ORAL DAILY
Qty: 30 TABLET | Refills: 5 | Status: SHIPPED | OUTPATIENT
Start: 2024-07-08

## 2024-07-10 ENCOUNTER — OFFICE VISIT (OUTPATIENT)
Dept: INTERNAL MEDICINE CLINIC | Facility: CLINIC | Age: 89
End: 2024-07-10
Payer: MEDICARE

## 2024-07-10 VITALS
BODY MASS INDEX: 30.16 KG/M2 | SYSTOLIC BLOOD PRESSURE: 124 MMHG | DIASTOLIC BLOOD PRESSURE: 68 MMHG | HEIGHT: 65 IN | OXYGEN SATURATION: 95 % | WEIGHT: 181 LBS | HEART RATE: 79 BPM | RESPIRATION RATE: 17 BRPM

## 2024-07-10 DIAGNOSIS — I10 PRIMARY HYPERTENSION: ICD-10-CM

## 2024-07-10 DIAGNOSIS — E78.2 MIXED HYPERLIPIDEMIA: ICD-10-CM

## 2024-07-10 DIAGNOSIS — R60.0 LOCALIZED EDEMA: ICD-10-CM

## 2024-07-10 DIAGNOSIS — K21.9 GASTROESOPHAGEAL REFLUX DISEASE WITHOUT ESOPHAGITIS: ICD-10-CM

## 2024-07-10 DIAGNOSIS — M79.89 LEG SWELLING: ICD-10-CM

## 2024-07-10 DIAGNOSIS — N63.23 MASS OF LOWER OUTER QUADRANT OF LEFT BREAST: ICD-10-CM

## 2024-07-10 DIAGNOSIS — S81.802S UNSPECIFIED OPEN WOUND, LEFT LOWER LEG, SEQUELA: ICD-10-CM

## 2024-07-10 DIAGNOSIS — F32.A DEPRESSION, UNSPECIFIED DEPRESSION TYPE: Primary | ICD-10-CM

## 2024-07-10 PROCEDURE — G2211 COMPLEX E/M VISIT ADD ON: HCPCS | Performed by: INTERNAL MEDICINE

## 2024-07-10 PROCEDURE — 99214 OFFICE O/P EST MOD 30 MIN: CPT | Performed by: INTERNAL MEDICINE

## 2024-07-10 RX ORDER — POTASSIUM CHLORIDE 750 MG/1
CAPSULE, EXTENDED RELEASE ORAL
Qty: 180 CAPSULE | Refills: 0 | Status: SHIPPED | OUTPATIENT
Start: 2024-07-10

## 2024-07-10 RX ORDER — FUROSEMIDE 20 MG/1
TABLET ORAL
Qty: 45 TABLET | Refills: 1 | Status: SHIPPED | OUTPATIENT
Start: 2024-07-10

## 2024-07-10 NOTE — PROGRESS NOTES
Ambulatory Visit  Name: Diya Garcia      : 1929      MRN: [id number]  Encounter Provider: All Edouard MD  Encounter Date: 7/10/2024   Encounter department: [facility] INTERNAL MEDICINE [place]    Assessment & Plan     1. Depression, unspecified depression type  2. Localized edema  -     furosemide (LASIX) 20 mg tablet; Take one tab po every other day  3. Leg swelling  -     potassium chloride (MICRO-K) 10 MEQ CR capsule; Take 2 tabs po when you take Furosemide.  4. Primary hypertension  -     CBC and differential; Future  -     Basic metabolic panel; Future  -     TSH, 3rd generation with Free T4 reflex; Future  5. Gastroesophageal reflux disease without esophagitis  6. Mixed hyperlipidemia  -     CBC and differential; Future  -     Basic metabolic panel; Future  -     TSH, 3rd generation with Free T4 reflex; Future  7. Mass of lower outer quadrant of left breast  8. Unspecified open wound, left lower leg, sequela      Depression Screening and Follow-up Plan: Patient was screened for depression during today's encounter. They screened negative with a PHQ-2 score of 0.    Tobacco Cessation Counseling: Tobacco cessation counseling was provided. The patient is sincerely urged to quit consumption of tobacco. She is not ready to quit tobacco.         History of Present Illness     Patient is here for routine follow up, reviewed chronic medical problems, ordered labs for next visit including CBC CMP TSH A1C LIPID. Reviewed labs for this visit.      Review of Systems   Constitutional:  Negative for chills and fever.   HENT:  Negative for ear pain and sore throat.    Eyes:  Negative for pain and visual disturbance.   Respiratory:  Negative for cough and shortness of breath.    Cardiovascular:  Negative for chest pain and palpitations.   Gastrointestinal:  Negative for abdominal pain and vomiting.   Genitourinary:  Negative for dysuria and hematuria.   Musculoskeletal:  Negative for arthralgias and back  pain.   Skin:  Negative for color change and rash.   Neurological:  Negative for seizures and syncope.   All other systems reviewed and are negative.    Past Medical History:   Diagnosis Date    Arthritis 2000    approximately    Breast cancer (HCC)     Cancer (HCC) 2010    approximately    HL (hearing loss) 2010    Hypertension     on and off    Macular degeneration     Visual impairment 2018    macular degeneration     Past Surgical History:   Procedure Laterality Date    EAR SURGERY      EYE SURGERY      cataracts    HIP SURGERY      JOINT REPLACEMENT  ?    knee & hip    KNEE SURGERY       History reviewed. No pertinent family history.  Social History     Tobacco Use    Smoking status: Some Days     Current packs/day: 0.25     Average packs/day: 0.3 packs/day for 20.0 years (5.0 ttl pk-yrs)     Types: Cigarettes    Smokeless tobacco: Never   Vaping Use    Vaping status: Never Used   Substance and Sexual Activity    Alcohol use: Yes     Alcohol/week: 4.0 standard drinks of alcohol     Types: 4 Standard drinks or equivalent per week    Drug use: Never    Sexual activity: Not Currently     Birth control/protection: Post-menopausal     Current Outpatient Medications on File Prior to Visit   Medication Sig    acetaminophen (TYLENOL) 500 mg tablet Take 1 tablet (500 mg total) by mouth every 6 (six) hours as needed for mild pain    enalapril (VASOTEC) 10 mg tablet Take 1 tablet (10 mg total) by mouth daily    escitalopram (LEXAPRO) 5 mg tablet Take 1 tablet (5 mg total) by mouth daily    fluticasone (FLONASE) 50 mcg/act nasal spray 2 sprays into each nostril daily    meloxicam (Mobic) 15 mg tablet Take 1 tablet (15 mg total) by mouth daily    Multiple Vitamins-Minerals (PreserVision AREDS 2+Multi Vit) CAPS Take by mouth    mupirocin (BACTROBAN) 2 % ointment Apply topically 2 (two) times a day for 14 days    omeprazole (PriLOSEC) 20 mg delayed release capsule Take 1 capsule (20 mg total) by mouth daily before breakfast  "   [DISCONTINUED] furosemide (LASIX) 20 mg tablet Take one tab po every other day    [DISCONTINUED] potassium chloride (MICRO-K) 10 MEQ CR capsule Take 2 tabs po when you take Furosemide.     No Known Allergies  Immunization History   Administered Date(s) Administered    COVID-19 MODERNA VACC 0.5 ML IM 01/26/2021, 03/04/2021, 11/03/2021, 04/18/2022    COVID-19 Pfizer Vac BIVALENT Alvaro-sucrose 12 Yr+ IM 09/28/2022     Objective     /68 (BP Location: Right arm, Patient Position: Sitting, Cuff Size: Adult)   Pulse 79   Resp 17   Ht 5' 5\" (1.651 m)   Wt 82.1 kg (181 lb)   SpO2 95%   BMI 30.12 kg/m²     Physical Exam  Vitals and nursing note reviewed.   Constitutional:       General: She is not in acute distress.     Appearance: She is well-developed.   HENT:      Head: Normocephalic.   Cardiovascular:      Rate and Rhythm: Normal rate and regular rhythm.      Heart sounds: No murmur heard.  Pulmonary:      Effort: Pulmonary effort is normal. No respiratory distress.      Breath sounds: Normal breath sounds.   Abdominal:      Tenderness: There is no abdominal tenderness.   Musculoskeletal:         General: No swelling.      Cervical back: Neck supple.   Skin:     General: Skin is warm and dry.      Capillary Refill: Capillary refill takes less than 2 seconds.   Neurological:      Mental Status: She is alert and oriented to person, place, and time.      Gait: Gait abnormal.   Psychiatric:         Mood and Affect: Mood normal.       Administrative Statements   I have spent a total time of 15 minutes in caring for this patient on the day of the visit/encounter including Instructions for management, Patient and family education, Risk factor reductions, Documenting in the medical record, and Obtaining or reviewing history  .  "

## 2024-07-29 DIAGNOSIS — K21.9 GASTROESOPHAGEAL REFLUX DISEASE WITHOUT ESOPHAGITIS: ICD-10-CM

## 2024-07-29 RX ORDER — OMEPRAZOLE 20 MG/1
20 CAPSULE, DELAYED RELEASE ORAL
Qty: 30 CAPSULE | Refills: 5 | Status: SHIPPED | OUTPATIENT
Start: 2024-07-29 | End: 2025-01-25

## 2024-10-14 ENCOUNTER — OFFICE VISIT (OUTPATIENT)
Dept: INTERNAL MEDICINE CLINIC | Facility: CLINIC | Age: 89
End: 2024-10-14
Payer: MEDICARE

## 2024-10-14 VITALS
HEIGHT: 65 IN | HEART RATE: 67 BPM | WEIGHT: 182 LBS | SYSTOLIC BLOOD PRESSURE: 120 MMHG | OXYGEN SATURATION: 96 % | RESPIRATION RATE: 17 BRPM | BODY MASS INDEX: 30.32 KG/M2 | DIASTOLIC BLOOD PRESSURE: 74 MMHG

## 2024-10-14 DIAGNOSIS — M1A.0711 CHRONIC IDIOPATHIC GOUT INVOLVING TOE OF RIGHT FOOT WITH TOPHUS: ICD-10-CM

## 2024-10-14 DIAGNOSIS — S81.802S UNSPECIFIED OPEN WOUND, LEFT LOWER LEG, SEQUELA: ICD-10-CM

## 2024-10-14 DIAGNOSIS — R06.02 SOB (SHORTNESS OF BREATH): ICD-10-CM

## 2024-10-14 DIAGNOSIS — R06.2 WHEEZING: Primary | ICD-10-CM

## 2024-10-14 DIAGNOSIS — R60.0 EDEMA OF LEFT LOWER LEG DUE TO PERIPHERAL VENOUS INSUFFICIENCY: ICD-10-CM

## 2024-10-14 DIAGNOSIS — I87.2 EDEMA OF LEFT LOWER LEG DUE TO PERIPHERAL VENOUS INSUFFICIENCY: ICD-10-CM

## 2024-10-14 PROCEDURE — G2211 COMPLEX E/M VISIT ADD ON: HCPCS | Performed by: INTERNAL MEDICINE

## 2024-10-14 PROCEDURE — 99214 OFFICE O/P EST MOD 30 MIN: CPT | Performed by: INTERNAL MEDICINE

## 2024-10-14 RX ORDER — ALBUTEROL SULFATE 90 UG/1
2 INHALANT RESPIRATORY (INHALATION) EVERY 6 HOURS PRN
Qty: 18 G | Refills: 5 | Status: SHIPPED | OUTPATIENT
Start: 2024-10-14

## 2024-10-14 NOTE — ASSESSMENT & PLAN NOTE
Appears similar to the wounds of her right lower leg.  Cleaned at this visit.  Dressed at this visit.  Likely have to see wound care in the future.

## 2024-10-14 NOTE — ASSESSMENT & PLAN NOTE
Discussed keeping her foot protected.  She is not a surgical candidate.  Can consider podiatry in the future.

## 2024-10-14 NOTE — PROGRESS NOTES
Ambulatory Visit  Name: Diya Garcia      : 1929      MRN: [id number]  Encounter Provider: All Edouard MD  Encounter Date: 10/14/2024   Encounter department: [facility] INTERNAL MEDICINE [place]    Assessment & Plan  Wheezing    See below.  Orders:    albuterol (Ventolin HFA) 90 mcg/act inhaler; Inhale 2 puffs every 6 (six) hours as needed for wheezing    SOB (shortness of breath)    Shortness of breath at bedtime when she walks from the bathroom to the bed.  Denies chest pain, does report some palpitations after doing this.  Discussed seeing cardiology.  She had an echo back in  which was normal.  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  She is not a surgical candidate.  Can consider podiatry in the future.       Unspecified open wound, left lower leg, sequela    Appears similar to the wounds of her right lower leg.  Cleaned at this visit.  Dressed at this visit.  Likely have to see wound care in the future.       Edema of left lower leg due to peripheral venous insufficiency    Continue supportive management.  Use compression stockings.         Depression Screening and Follow-up Plan: Patient with underlying depression and was advised to continue current medications as prescribed.     Tobacco Cessation Counseling: Tobacco cessation counseling was provided. The patient is sincerely urged to quit consumption of tobacco. She is not ready to quit tobacco. Medication options discussed.       History of Present Illness     Here with new wound to her leg.        History obtained from : patient  Review of Systems   Constitutional:  Negative for chills and fever.   HENT:  Positive for hearing loss. Negative for ear pain and sore throat.    Eyes:  Negative for pain and visual disturbance.   Respiratory:  Positive for  shortness of breath. Negative for cough.    Cardiovascular:  Negative for chest pain and palpitations.   Gastrointestinal:  Negative for abdominal pain and vomiting.   Genitourinary:  Negative for dysuria and hematuria.   Musculoskeletal:  Negative for arthralgias and back pain.   Skin:  Negative for color change and rash.   Neurological:  Negative for seizures and syncope.   All other systems reviewed and are negative.    Past Medical History   Past Medical History:   Diagnosis Date    Arthritis 2000    approximately    Breast cancer (HCC)     Cancer (HCC) 2010    approximately    HL (hearing loss) 2010    Hypertension     on and off    Macular degeneration     Visual impairment 2018    macular degeneration     Past Surgical History:   Procedure Laterality Date    EAR SURGERY      EYE SURGERY      cataracts    HIP SURGERY      JOINT REPLACEMENT  ?    knee & hip    KNEE SURGERY       History reviewed. No pertinent family history.  Current Outpatient Medications on File Prior to Visit   Medication Sig Dispense Refill    acetaminophen (TYLENOL) 500 mg tablet Take 1 tablet (500 mg total) by mouth every 6 (six) hours as needed for mild pain 30 tablet 0    enalapril (VASOTEC) 10 mg tablet Take 1 tablet (10 mg total) by mouth daily 90 tablet 3    escitalopram (LEXAPRO) 5 mg tablet Take 1 tablet (5 mg total) by mouth daily 30 tablet 5    fluticasone (FLONASE) 50 mcg/act nasal spray 2 sprays into each nostril daily 16 g 5    furosemide (LASIX) 20 mg tablet Take one tab po every other day 45 tablet 1    meloxicam (Mobic) 15 mg tablet Take 1 tablet (15 mg total) by mouth daily 30 tablet 1    Multiple Vitamins-Minerals (PreserVision AREDS 2+Multi Vit) CAPS Take by mouth      mupirocin (BACTROBAN) 2 % ointment Apply topically 2 (two) times a day for 14 days 15 g 0    omeprazole (PriLOSEC) 20 mg delayed release capsule Take 1 capsule (20 mg total) by mouth daily before breakfast 30 capsule 5    potassium chloride (MICRO-K) 10  "MEQ CR capsule Take 2 tabs po when you take Furosemide. 180 capsule 0     No current facility-administered medications on file prior to visit.   No Known Allergies       Objective     /74 (BP Location: Left arm, Patient Position: Sitting, Cuff Size: Adult)   Pulse 67   Resp 17   Ht 5' 5\" (1.651 m)   Wt 82.6 kg (182 lb)   SpO2 96%   BMI 30.29 kg/m²     Physical Exam  Vitals and nursing note reviewed.   Constitutional:       General: She is not in acute distress.     Appearance: She is well-developed.   HENT:      Head: Normocephalic and atraumatic.   Cardiovascular:      Rate and Rhythm: Normal rate.      Heart sounds: No murmur heard.  Pulmonary:      Effort: Pulmonary effort is normal. No respiratory distress.   Abdominal:      Tenderness: There is no abdominal tenderness.   Musculoskeletal:         General: No swelling.      Cervical back: Neck supple.   Skin:     General: Skin is warm and dry.      Capillary Refill: Capillary refill takes less than 2 seconds.   Neurological:      Mental Status: She is alert. Mental status is at baseline.      Gait: Gait abnormal.   Psychiatric:         Mood and Affect: Mood normal.         I have spent a total time of 15 minutes in caring for this patient on the day of the visit/encounter including Patient and family education, Importance of tx compliance, Risk factor reductions, Counseling / Coordination of care, and Reviewing / ordering tests, medicine, procedures  .  "

## 2024-11-18 ENCOUNTER — TELEPHONE (OUTPATIENT)
Dept: INTERNAL MEDICINE CLINIC | Facility: CLINIC | Age: 89
End: 2024-11-18

## 2024-11-18 ENCOUNTER — OFFICE VISIT (OUTPATIENT)
Dept: INTERNAL MEDICINE CLINIC | Facility: CLINIC | Age: 89
End: 2024-11-18
Payer: MEDICARE

## 2024-11-18 VITALS
OXYGEN SATURATION: 93 % | SYSTOLIC BLOOD PRESSURE: 132 MMHG | BODY MASS INDEX: 30.29 KG/M2 | HEIGHT: 65 IN | DIASTOLIC BLOOD PRESSURE: 82 MMHG | HEART RATE: 70 BPM

## 2024-11-18 DIAGNOSIS — S81.802S UNSPECIFIED OPEN WOUND, LEFT LOWER LEG, SEQUELA: ICD-10-CM

## 2024-11-18 DIAGNOSIS — M1A.0711 CHRONIC IDIOPATHIC GOUT INVOLVING TOE OF RIGHT FOOT WITH TOPHUS: ICD-10-CM

## 2024-11-18 DIAGNOSIS — Z00.00 MEDICARE ANNUAL WELLNESS VISIT, SUBSEQUENT: Primary | ICD-10-CM

## 2024-11-18 DIAGNOSIS — R60.0 EDEMA OF LEFT LOWER LEG DUE TO PERIPHERAL VENOUS INSUFFICIENCY: ICD-10-CM

## 2024-11-18 DIAGNOSIS — I87.2 EDEMA OF LEFT LOWER LEG DUE TO PERIPHERAL VENOUS INSUFFICIENCY: ICD-10-CM

## 2024-11-18 DIAGNOSIS — J45.20 INTERMITTENT ASTHMA WITHOUT COMPLICATION, UNSPECIFIED ASTHMA SEVERITY: ICD-10-CM

## 2024-11-18 PROCEDURE — 99214 OFFICE O/P EST MOD 30 MIN: CPT | Performed by: INTERNAL MEDICINE

## 2024-11-18 PROCEDURE — G0439 PPPS, SUBSEQ VISIT: HCPCS | Performed by: INTERNAL MEDICINE

## 2024-11-18 RX ORDER — ALBUTEROL SULFATE 0.83 MG/ML
2.5 SOLUTION RESPIRATORY (INHALATION) EVERY 6 HOURS PRN
Qty: 180 ML | Refills: 5 | Status: SHIPPED | OUTPATIENT
Start: 2024-11-18

## 2024-11-18 RX ORDER — BENZONATATE 200 MG/1
200 CAPSULE ORAL 3 TIMES DAILY PRN
Qty: 20 CAPSULE | Refills: 0 | Status: SHIPPED | OUTPATIENT
Start: 2024-11-18

## 2024-11-18 NOTE — ASSESSMENT & PLAN NOTE
Orders:    albuterol (2.5 mg/3 mL) 0.083 % nebulizer solution; Take 3 mL (2.5 mg total) by nebulization every 6 (six) hours as needed for wheezing or shortness of breath    Nebulizer    benzonatate (TESSALON) 200 MG capsule; Take 1 capsule (200 mg total) by mouth 3 (three) times a day as needed for cough    Nebulizer    Nebulizer Supplies

## 2024-11-18 NOTE — PROGRESS NOTES
Name: Diya Garcia      : 1929      MRN: [id number]  Encounter Provider: All Edouard MD  Encounter Date: 2024   Encounter department: [facility] INTERNAL MEDICINE [place]    Assessment & Plan  Medicare annual wellness visit, subsequent  AWV completed.       Edema of left lower leg due to peripheral venous insufficiency    Continue supportive management.  Use compression stockings.         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  She is not a surgical candidate.  Podiatry follow up ordered.  Orders:    Ambulatory Referral to Podiatry; Future      Unspecified open wound, left lower leg, sequela    Appears similar to the wounds of her right lower leg.  Cleaned at this visit.  Dressed at this visit.  Likely have to see wound care in the future.         Intermittent asthma without complication, unspecified asthma severity    Orders:    albuterol (2.5 mg/3 mL) 0.083 % nebulizer solution; Take 3 mL (2.5 mg total) by nebulization every 6 (six) hours as needed for wheezing or shortness of breath    Nebulizer    benzonatate (TESSALON) 200 MG capsule; Take 1 capsule (200 mg total) by mouth 3 (three) times a day as needed for cough    Nebulizer    Nebulizer Supplies      Depression Screening and Follow-up Plan: Patient was screened for depression during today's encounter. They screened negative with a PHQ-2 score of 0.    Tobacco Cessation Counseling: Tobacco cessation counseling was provided. The patient is sincerely urged to quit consumption of tobacco. She is not ready to quit tobacco.       Preventive health issues were discussed with patient, and age appropriate screening tests were ordered as noted in patient's After Visit Summary. Personalized health advice and appropriate referrals for health education or preventive services given if needed, as noted in patient's After Visit Summary.    History of Present Illness   Here for  AWV.       Patient Care Team:  All Edouard MD as PCP - General (Internal Medicine)    Review of Systems   Constitutional:  Negative for chills and fever.   HENT:  Negative for ear pain and sore throat.    Eyes:  Negative for pain and visual disturbance.   Respiratory:  Negative for cough and shortness of breath.    Cardiovascular:  Negative for chest pain and palpitations.   Gastrointestinal:  Negative for abdominal pain and vomiting.   Genitourinary:  Negative for dysuria and hematuria.   Musculoskeletal:  Negative for arthralgias and back pain.   Skin:  Negative for color change and rash.   Neurological:  Negative for seizures and syncope.   All other systems reviewed and are negative.    Medical History Reviewed by provider this encounter:  Tobacco  Allergies  Meds  Problems  Med Hx  Surg Hx  Fam Hx       Annual Wellness Visit Questionnaire   Diya is here for her Subsequent Wellness visit. Last Medicare Wellness visit information reviewed, patient interviewed and updates made to the record today.      Health Risk Assessment:   Patient rates overall health as good. Patient feels that their physical health rating is much better. Patient is very satisfied with their life. Eyesight was rated as same. Hearing was rated as slightly worse. Patient feels that their emotional and mental health rating is same. Patients states they are never, rarely angry. Patient states they are never, rarely unusually tired/fatigued. Pain experienced in the last 7 days has been none. Patient states that she has experienced no weight loss or gain in last 6 months.     Depression Screening:   PHQ-2 Score: 0      Fall Risk Screening:   In the past year, patient has experienced: no history of falling in past year      Urinary Incontinence Screening:   Patient has not leaked urine accidently in the last six months.     Home Safety:  Patient has trouble with stairs inside or outside of their home. Patient has working smoke  alarms and has working carbon monoxide detector. Home safety hazards include: none.     Nutrition:   Current diet is Regular.     Medications:   Patient is currently taking over-the-counter supplements. OTC medications include: see medication list. Patient is not able to manage medications.     Activities of Daily Living (ADLs)/Instrumental Activities of Daily Living (IADLs):   Walk and transfer into and out of bed and chair?: Yes  Dress and groom yourself?: Yes    Bathe or shower yourself?: No    Feed yourself? Yes  Do your laundry/housekeeping?: No  Manage your money, pay your bills and track your expenses?: No  Make your own meals?: No    Do your own shopping?: No    Previous Hospitalizations:   Any hospitalizations or ED visits within the last 12 months?: No      Advance Care Planning:   Living will: Yes    Durable POA for healthcare: Yes    Advanced directive: Yes      Cognitive Screening:   Provider or family/friend/caregiver concerned regarding cognition?: No    PREVENTIVE SCREENINGS      Cardiovascular Screening:    General: History Lipid Disorder and Screening Current      Diabetes Screening:     General: Screening Current      Colorectal Cancer Screening:     General: Screening Not Indicated      Breast Cancer Screening:     General: History Breast Cancer and Screening Not Indicated      Cervical Cancer Screening:    General: Screening Not Indicated      Osteoporosis Screening:    General: Screening Not Indicated      Abdominal Aortic Aneurysm (AAA) Screening:        General: Screening Not Indicated      Lung Cancer Screening:     General: Screening Not Indicated      Hepatitis C Screening:    General: Screening Not Indicated    Screening, Brief Intervention, and Referral to Treatment (SBIRT)    Screening  Typical number of drinks in a day: 0  Typical number of drinks in a week: 0  Interpretation: Low risk drinking behavior.    Single Item Drug Screening:  How often have you used an illegal drug (including  "marijuana) or a prescription medication for non-medical reasons in the past year? never    Single Item Drug Screen Score: 0  Interpretation: Negative screen for possible drug use disorder    Brief Intervention  Alcohol & drug use screenings were reviewed. No concerns regarding substance use disorder identified.     Other Counseling Topics:   Car/seat belt/driving safety.     Social Drivers of Health     Financial Resource Strain: Low Risk  (10/9/2023)    Overall Financial Resource Strain (CARDIA)     Difficulty of Paying Living Expenses: Not hard at all   Food Insecurity: No Food Insecurity (11/18/2024)    Hunger Vital Sign     Worried About Running Out of Food in the Last Year: Never true     Ran Out of Food in the Last Year: Never true   Transportation Needs: No Transportation Needs (11/18/2024)    PRAPARE - Transportation     Lack of Transportation (Medical): No     Lack of Transportation (Non-Medical): No   Housing Stability: Low Risk  (11/18/2024)    Housing Stability Vital Sign     Unable to Pay for Housing in the Last Year: No     Number of Times Moved in the Last Year: 0     Homeless in the Last Year: No   Utilities: Not At Risk (11/18/2024)    [facility] Utilities     Threatened with loss of utilities: No     No results found.    /82 (BP Location: Right arm, Patient Position: Sitting, Cuff Size: Adult)   Pulse 70   Ht 5' 5\" (1.651 m)   SpO2 93%   BMI 30.29 kg/m²     Physical Exam  Vitals and nursing note reviewed.   Constitutional:       General: She is not in acute distress.     Appearance: She is well-developed.   HENT:      Head: Normocephalic and atraumatic.   Eyes:      Conjunctiva/sclera: Conjunctivae normal.   Cardiovascular:      Rate and Rhythm: Normal rate and regular rhythm.      Heart sounds: No murmur heard.  Pulmonary:      Effort: Pulmonary effort is normal. No respiratory distress.      Breath sounds: Normal breath sounds.   Abdominal:      Palpations: Abdomen is soft.      Tenderness: " There is no abdominal tenderness.   Musculoskeletal:         General: No swelling.      Cervical back: Neck supple.   Skin:     General: Skin is warm and dry.      Capillary Refill: Capillary refill takes less than 2 seconds.   Neurological:      Mental Status: She is alert.   Psychiatric:         Mood and Affect: Mood normal.       Administrative Statements   I have spent a total time of 25 minutes in caring for this patient on the day of the visit/encounter including Patient and family education, Impressions, and Reviewing / ordering tests, medicine, procedures  .

## 2024-11-18 NOTE — ASSESSMENT & PLAN NOTE
Discussed keeping her foot protected.  She is not a surgical candidate.  Podiatry follow up ordered.  Orders:    Ambulatory Referral to Podiatry; Future

## 2024-11-18 NOTE — TELEPHONE ENCOUNTER
All Edouard MD  [facility] Internal Med Clinical  Can we order neb machine for pt, dx is asthma, it is on my note     Order placed through [employer]

## 2024-11-19 ENCOUNTER — TELEPHONE (OUTPATIENT)
Age: 89
End: 2024-11-19

## 2024-11-19 LAB
DME PARACHUTE DELIVERY DATE REQUESTED: NORMAL
DME PARACHUTE ITEM DESCRIPTION: NORMAL
DME PARACHUTE ORDER STATUS: NORMAL
DME PARACHUTE SUPPLIER NAME: NORMAL
DME PARACHUTE SUPPLIER PHONE: NORMAL

## 2024-11-19 NOTE — TELEPHONE ENCOUNTER
Medications that are used with DME will not be covered by Medicare Part D plans. Please use [facility] to order nebulizer medications from a DME company. This process will select a DME company that participates with patients insurance and handle all processing and shipping. Retail pharmacies can not process these types of medication unless they submit it under Medicare Part B. If the pt wants to continue to use their local retail pharmacy, please call the pharmacy and have the pharmacist submit nebulizer medication under patient Medicare Part B plan. This is the quickest most efficient way to ensure the pt gets their medications in a timely manner.

## 2024-11-21 LAB
DME PARACHUTE DELIVERY DATE ACTUAL: NORMAL
DME PARACHUTE DELIVERY DATE REQUESTED: NORMAL
DME PARACHUTE ITEM DESCRIPTION: NORMAL
DME PARACHUTE ORDER STATUS: NORMAL
DME PARACHUTE SUPPLIER NAME: NORMAL
DME PARACHUTE SUPPLIER PHONE: NORMAL

## 2024-12-13 ENCOUNTER — APPOINTMENT (EMERGENCY)
Dept: RADIOLOGY | Facility: HOSPITAL | Age: 89
DRG: 563 | End: 2024-12-13
Payer: MEDICARE

## 2024-12-13 ENCOUNTER — HOSPITAL ENCOUNTER (INPATIENT)
Facility: HOSPITAL | Age: 89
LOS: 4 days | Discharge: NURSING, REHAB OR LONG-TERM CARE | DRG: 563 | End: 2024-12-19
Attending: EMERGENCY MEDICINE | Admitting: FAMILY MEDICINE
Payer: MEDICARE

## 2024-12-13 DIAGNOSIS — R26.2 AMBULATORY DYSFUNCTION: ICD-10-CM

## 2024-12-13 DIAGNOSIS — R29.898 WEAKNESS OF BOTH LOWER EXTREMITIES: Primary | ICD-10-CM

## 2024-12-13 DIAGNOSIS — S82.843A BIMALLEOLAR FRACTURE: ICD-10-CM

## 2024-12-13 DIAGNOSIS — M1A.0710 CHRONIC IDIOPATHIC GOUT INVOLVING TOE OF RIGHT FOOT WITHOUT TOPHUS: ICD-10-CM

## 2024-12-13 DIAGNOSIS — S82.832G CLOSED FRACTURE OF DISTAL END OF LEFT FIBULA WITH DELAYED HEALING, UNSPECIFIED FRACTURE MORPHOLOGY, SUBSEQUENT ENCOUNTER: ICD-10-CM

## 2024-12-13 DIAGNOSIS — S82.409A FIBULA FRACTURE: ICD-10-CM

## 2024-12-13 PROBLEM — S82.402A FRACTURE OF LEFT FIBULA: Status: ACTIVE | Noted: 2024-12-13

## 2024-12-13 LAB
ANION GAP SERPL CALCULATED.3IONS-SCNC: 10 MMOL/L (ref 4–13)
BACTERIA UR QL AUTO: ABNORMAL /HPF
BASOPHILS # BLD AUTO: 0.02 THOUSANDS/ÂΜL (ref 0–0.1)
BASOPHILS NFR BLD AUTO: 0 % (ref 0–1)
BILIRUB UR QL STRIP: NEGATIVE
BUN SERPL-MCNC: 26 MG/DL (ref 5–25)
CALCIUM SERPL-MCNC: 9.7 MG/DL (ref 8.4–10.2)
CARDIAC TROPONIN I PNL SERPL HS: 20 NG/L (ref ?–50)
CHLORIDE SERPL-SCNC: 107 MMOL/L (ref 96–108)
CLARITY UR: ABNORMAL
CO2 SERPL-SCNC: 24 MMOL/L (ref 21–32)
COLOR UR: YELLOW
CREAT SERPL-MCNC: 1.13 MG/DL (ref 0.6–1.3)
EOSINOPHIL # BLD AUTO: 0.02 THOUSAND/ÂΜL (ref 0–0.61)
EOSINOPHIL NFR BLD AUTO: 0 % (ref 0–6)
ERYTHROCYTE [DISTWIDTH] IN BLOOD BY AUTOMATED COUNT: 15.5 % (ref 11.6–15.1)
GFR SERPL CREATININE-BSD FRML MDRD: 41 ML/MIN/1.73SQ M
GLUCOSE SERPL-MCNC: 106 MG/DL (ref 65–140)
GLUCOSE UR STRIP-MCNC: NEGATIVE MG/DL
HCT VFR BLD AUTO: 39.8 % (ref 34.8–46.1)
HGB BLD-MCNC: 12.5 G/DL (ref 11.5–15.4)
HGB UR QL STRIP.AUTO: NEGATIVE
IMM GRANULOCYTES # BLD AUTO: 0.03 THOUSAND/UL (ref 0–0.2)
IMM GRANULOCYTES NFR BLD AUTO: 0 % (ref 0–2)
KETONES UR STRIP-MCNC: ABNORMAL MG/DL
LEUKOCYTE ESTERASE UR QL STRIP: ABNORMAL
LYMPHOCYTES # BLD AUTO: 0.89 THOUSANDS/ÂΜL (ref 0.6–4.47)
LYMPHOCYTES NFR BLD AUTO: 8 % (ref 14–44)
MCH RBC QN AUTO: 28.1 PG (ref 26.8–34.3)
MCHC RBC AUTO-ENTMCNC: 31.4 G/DL (ref 31.4–37.4)
MCV RBC AUTO: 89 FL (ref 82–98)
MONOCYTES # BLD AUTO: 0.83 THOUSAND/ÂΜL (ref 0.17–1.22)
MONOCYTES NFR BLD AUTO: 7 % (ref 4–12)
NEUTROPHILS # BLD AUTO: 10.06 THOUSANDS/ÂΜL (ref 1.85–7.62)
NEUTS SEG NFR BLD AUTO: 85 % (ref 43–75)
NITRITE UR QL STRIP: POSITIVE
NON-SQ EPI CELLS URNS QL MICRO: ABNORMAL /HPF
NRBC BLD AUTO-RTO: 0 /100 WBCS
PH UR STRIP.AUTO: 8.5 [PH]
PLATELET # BLD AUTO: 256 THOUSANDS/UL (ref 149–390)
PMV BLD AUTO: 9 FL (ref 8.9–12.7)
POTASSIUM SERPL-SCNC: 4.1 MMOL/L (ref 3.5–5.3)
PROT UR STRIP-MCNC: ABNORMAL MG/DL
RBC # BLD AUTO: 4.45 MILLION/UL (ref 3.81–5.12)
RBC #/AREA URNS AUTO: ABNORMAL /HPF
SODIUM SERPL-SCNC: 141 MMOL/L (ref 135–147)
SP GR UR STRIP.AUTO: 1.02 (ref 1–1.03)
TRI-PHOS CRY URNS QL MICRO: ABNORMAL /HPF
TSH SERPL DL<=0.05 MIU/L-ACNC: 2.43 UIU/ML (ref 0.45–4.5)
UROBILINOGEN UR STRIP-ACNC: <2 MG/DL
WBC # BLD AUTO: 11.85 THOUSAND/UL (ref 4.31–10.16)
WBC #/AREA URNS AUTO: ABNORMAL /HPF

## 2024-12-13 PROCEDURE — 73610 X-RAY EXAM OF ANKLE: CPT

## 2024-12-13 PROCEDURE — 84443 ASSAY THYROID STIM HORMONE: CPT

## 2024-12-13 PROCEDURE — 85025 COMPLETE CBC W/AUTO DIFF WBC: CPT

## 2024-12-13 PROCEDURE — 96374 THER/PROPH/DIAG INJ IV PUSH: CPT

## 2024-12-13 PROCEDURE — 99285 EMERGENCY DEPT VISIT HI MDM: CPT

## 2024-12-13 PROCEDURE — 29515 APPLICATION SHORT LEG SPLINT: CPT

## 2024-12-13 PROCEDURE — 99284 EMERGENCY DEPT VISIT MOD MDM: CPT

## 2024-12-13 PROCEDURE — 81001 URINALYSIS AUTO W/SCOPE: CPT

## 2024-12-13 PROCEDURE — 36415 COLL VENOUS BLD VENIPUNCTURE: CPT

## 2024-12-13 PROCEDURE — 73564 X-RAY EXAM KNEE 4 OR MORE: CPT

## 2024-12-13 PROCEDURE — 84484 ASSAY OF TROPONIN QUANT: CPT

## 2024-12-13 PROCEDURE — 80048 BASIC METABOLIC PNL TOTAL CA: CPT

## 2024-12-13 PROCEDURE — 93005 ELECTROCARDIOGRAM TRACING: CPT

## 2024-12-13 PROCEDURE — 99222 1ST HOSP IP/OBS MODERATE 55: CPT

## 2024-12-13 RX ORDER — FENTANYL CITRATE 50 UG/ML
25 INJECTION, SOLUTION INTRAMUSCULAR; INTRAVENOUS ONCE
Refills: 0 | Status: COMPLETED | OUTPATIENT
Start: 2024-12-13 | End: 2024-12-13

## 2024-12-13 RX ORDER — ONDANSETRON 2 MG/ML
4 INJECTION INTRAMUSCULAR; INTRAVENOUS EVERY 6 HOURS PRN
Status: DISCONTINUED | OUTPATIENT
Start: 2024-12-13 | End: 2024-12-19 | Stop reason: HOSPADM

## 2024-12-13 RX ORDER — HYDROMORPHONE HCL/PF 1 MG/ML
0.5 SYRINGE (ML) INJECTION EVERY 4 HOURS PRN
Status: DISCONTINUED | OUTPATIENT
Start: 2024-12-13 | End: 2024-12-14

## 2024-12-13 RX ORDER — LISINOPRIL 20 MG/1
20 TABLET ORAL DAILY
Status: DISCONTINUED | OUTPATIENT
Start: 2024-12-14 | End: 2024-12-15

## 2024-12-13 RX ORDER — BENZONATATE 100 MG/1
200 CAPSULE ORAL 3 TIMES DAILY PRN
Status: DISCONTINUED | OUTPATIENT
Start: 2024-12-13 | End: 2024-12-15

## 2024-12-13 RX ORDER — NYSTATIN 100000 [USP'U]/G
POWDER TOPICAL 2 TIMES DAILY
Status: DISCONTINUED | OUTPATIENT
Start: 2024-12-13 | End: 2024-12-19 | Stop reason: HOSPADM

## 2024-12-13 RX ORDER — OXYCODONE HYDROCHLORIDE 5 MG/1
5 TABLET ORAL EVERY 4 HOURS PRN
Refills: 0 | Status: DISCONTINUED | OUTPATIENT
Start: 2024-12-13 | End: 2024-12-13

## 2024-12-13 RX ORDER — ACETAMINOPHEN 325 MG/1
975 TABLET ORAL EVERY 6 HOURS PRN
Status: DISCONTINUED | OUTPATIENT
Start: 2024-12-13 | End: 2024-12-14

## 2024-12-13 RX ORDER — POLYETHYLENE GLYCOL 3350 17 G/17G
17 POWDER, FOR SOLUTION ORAL DAILY PRN
Status: DISCONTINUED | OUTPATIENT
Start: 2024-12-13 | End: 2024-12-19 | Stop reason: HOSPADM

## 2024-12-13 RX ORDER — OXYCODONE HYDROCHLORIDE 5 MG/1
5 TABLET ORAL EVERY 6 HOURS PRN
Refills: 0 | Status: DISCONTINUED | OUTPATIENT
Start: 2024-12-13 | End: 2024-12-14

## 2024-12-13 RX ORDER — ESCITALOPRAM OXALATE 10 MG/1
5 TABLET ORAL DAILY
Status: DISCONTINUED | OUTPATIENT
Start: 2024-12-14 | End: 2024-12-19 | Stop reason: HOSPADM

## 2024-12-13 RX ORDER — ENOXAPARIN SODIUM 100 MG/ML
40 INJECTION SUBCUTANEOUS DAILY
Status: DISCONTINUED | OUTPATIENT
Start: 2024-12-14 | End: 2024-12-15

## 2024-12-13 RX ORDER — ALBUTEROL SULFATE 0.83 MG/ML
2.5 SOLUTION RESPIRATORY (INHALATION) EVERY 6 HOURS PRN
Status: DISCONTINUED | OUTPATIENT
Start: 2024-12-13 | End: 2024-12-19 | Stop reason: HOSPADM

## 2024-12-13 RX ORDER — HYDROMORPHONE HCL/PF 1 MG/ML
0.5 SYRINGE (ML) INJECTION EVERY 4 HOURS PRN
Status: DISCONTINUED | OUTPATIENT
Start: 2024-12-13 | End: 2024-12-13

## 2024-12-13 RX ORDER — MAGNESIUM HYDROXIDE/ALUMINUM HYDROXICE/SIMETHICONE 120; 1200; 1200 MG/30ML; MG/30ML; MG/30ML
30 SUSPENSION ORAL EVERY 6 HOURS PRN
Status: DISCONTINUED | OUTPATIENT
Start: 2024-12-13 | End: 2024-12-19 | Stop reason: HOSPADM

## 2024-12-13 RX ORDER — FUROSEMIDE 20 MG/1
20 TABLET ORAL DAILY
Status: DISCONTINUED | OUTPATIENT
Start: 2024-12-14 | End: 2024-12-15

## 2024-12-13 RX ORDER — ACETAMINOPHEN 325 MG/1
650 TABLET ORAL ONCE
Status: COMPLETED | OUTPATIENT
Start: 2024-12-13 | End: 2024-12-13

## 2024-12-13 RX ADMIN — ACETAMINOPHEN 650 MG: 325 TABLET ORAL at 13:57

## 2024-12-13 RX ADMIN — FENTANYL CITRATE 25 MCG: 50 INJECTION INTRAMUSCULAR; INTRAVENOUS at 16:55

## 2024-12-13 NOTE — ED NOTES
Multiple attempts for redraw of hemolyzed blood work by multiple nurses and provider. Another nurse at bedside with US to attempt blood draw.     Ninoska Gil RN  12/13/24 [id number]

## 2024-12-13 NOTE — ED PROVIDER NOTES
Time reflects when diagnosis was documented in both MDM as applicable and the Disposition within this note       Time User Action Codes Description Comment    12/13/2024  1:24 PM Naradko, Allie Add [S82.409A] Fibula fracture     12/13/2024  1:24 PM Naradko, Allie Add [R29.898] Weakness of both lower extremities     12/13/2024  5:10 PM Naradko, Allie Modify [S82.409A] Fibula fracture     12/13/2024  5:10 PM Naradko, Allie Modify [R29.898] Weakness of both lower extremities     12/13/2024  5:20 PM Naradko, Allie Add [R26.2] Ambulatory dysfunction           ED Disposition       ED Disposition   Admit    Condition   Stable    Date/Time   Fri Dec 13, 2024  5:10 PM    Comment   Case was discussed with PARUL and the patient's admission status was agreed to be Admission Status: observation status to the service of Dr. Juan .               Assessment & Plan       Medical Decision Making  Patient is a 89-year-old female presenting for evaluation of bilateral lower extremity weakness and left ankle pain.  Upon examination, patient is well-appearing and does not appear in any acute distress.  Vital signs are within normal limits.  Upon examination, patient notes tenderness upon palpation of the lateral aspect of her ankle.  Is able to wiggle her toes.  Neurovascular and sensation intact.  2+ pulses.    Differentials include but are not limited to: Electrolyte imbalance, anemia, thyroid dysfunction, arrhythmia, ACS, ankle fracture, dislocation, soft tissue swelling.    Discussed with family will obtain blood work, urinalysis, EKG, and x-rays for further evaluation.  Blood work does not show any evidence of electrolyte imbalance or anemia.  Mild leukocytosis noted and suspect stress reaction. Initial troponin=20. EKG showing normal sinus rhythm with a heart rate of 70 and a left axis shift.  T wave inversions present in leads V1/V5/III. HEART score=4.  Pending urinalysis and TSH. Oblique distal left fibular fracture  noted on my interpretation of ankle x-ray.  No obvious fracture or abnormality noted on my interpretation of right knee x-ray.  Reached out to orthopedics ([place]) for further recommendations.  Ortho stating that fracture does not appear acute and no interventions indicated at this time.  Consult to orthopedics placed for outpatient follow-up.  Patient's foot placed in stirrup/posterior short leg splint (see procedure note).  Two chronic nondraining wounds noted under area of splint application. Neurovascular and sensation intact post-splint application. Reached out to [facility] for admission with evaluation for rehab.    Amount and/or Complexity of Data Reviewed  Labs: ordered. Decision-making details documented in ED Course.     Details: Reviewed   Radiology: ordered and independent interpretation performed.     Details: Reviewed     Risk  OTC drugs.  Prescription drug management.  Decision regarding hospitalization.        ED Course as of 12/13/24 1725   Fri Dec 13, 2024   1340 Oblique distal left fibular fracture noted on my interpretation of x-ray.   1442 CBC and differential(!)  Mild leukocytosis   1641 HS Troponin 0hr (reflex protocol)  Troponin=20.   1641 Basic metabolic panel(!)  No electrolyte imbalance.        Medications   acetaminophen (TYLENOL) tablet 650 mg (650 mg Oral Given 12/13/24 1357)   fentaNYL injection 25 mcg (25 mcg Intravenous Given 12/13/24 1655)       ED Risk Strat Scores   HEART Risk Score      Flowsheet Row Most Recent Value   Heart Score Risk Calculator    History 0 Filed at: 12/13/2024 1653   ECG 0 Filed at: 12/13/2024 1653   Age 2 Filed at: 12/13/2024 1653   Risk Factors 1 Filed at: 12/13/2024 1653   Troponin 1 Filed at: 12/13/2024 1653   HEART Score 4 Filed at: 12/13/2024 1653          HEART Risk Score      Flowsheet Row Most Recent Value   Heart Score Risk Calculator    History 0 Filed at: 12/13/2024 1653   ECG 0 Filed at: 12/13/2024 1653   Age 2 Filed at: 12/13/2024 1653   Risk Factors  1 Filed at: 12/13/2024 1653   Troponin 1 Filed at: 12/13/2024 1653   HEART Score 4 Filed at: 12/13/2024 1653                            SBIRT 20yo+      Flowsheet Row Most Recent Value   Initial Alcohol Screen: US AUDIT-C     1. How often do you have a drink containing alcohol? 0 Filed at: 12/13/2024 1226   2. How many drinks containing alcohol do you have on a typical day you are drinking?  0 Filed at: 12/13/2024 1226   3b. FEMALE Any Age, or MALE 65+: How often do you have 4 or more drinks on one occassion? 0 Filed at: 12/13/2024 1226   Audit-C Score 0 Filed at: 12/13/2024 1226   STEFANIE: How many times in the past year have you...    Used an illegal drug or used a prescription medication for non-medical reasons? Never Filed at: 12/13/2024 1226                            History of Present Illness       Chief Complaint   Patient presents with    Ankle Injury     Pt c/o left ankle pain after being assisted to the floor in the bathroom, -LOC, -BT       Past Medical History:   Diagnosis Date    Arthritis 2000    approximately    Breast cancer (HCC)     Cancer (HCC) 2010    approximately    HL (hearing loss) 2010    Hypertension     on and off    Macular degeneration     Visual impairment 2018    macular degeneration      Past Surgical History:   Procedure Laterality Date    EAR SURGERY      EYE SURGERY      cataracts    HIP SURGERY      JOINT REPLACEMENT  ?    knee & hip    KNEE SURGERY        History reviewed. No pertinent family history.   Social History     Tobacco Use    Smoking status: Some Days     Current packs/day: 0.25     Average packs/day: 0.3 packs/day for 20.0 years (5.0 ttl pk-yrs)     Types: Cigarettes    Smokeless tobacco: Never   Vaping Use    Vaping status: Never Used   Substance Use Topics    Alcohol use: Yes     Alcohol/week: 4.0 standard drinks of alcohol     Types: 4 Standard drinks or equivalent per week    Drug use: Never      E-Cigarette/Vaping    E-Cigarette Use Never User      "  E-Cigarette/Vaping Substances    Nicotine No     THC No     CBD No     Flavoring No     Other No     Unknown No       I have reviewed and agree with the history as documented.     Patient is a 89-year-old female with a past medical history including arthritis, asthma, cancer, hypertension, and macular degeneration.  Presents today for evaluation of ankle pain.  Per family at bedside, patient lives alone and has caregivers that come in to help her with what is needed.  Last night, patient went to get up but fell like she could not get out of the recliner.  Caregiver and neighbor were both able to get her out of the recliner today.  Patient was reporting feeling like her legs were more weak than usual and that her right knee was going to give out.  Today, while trying to stand and pivot her from the wheelchair to toilet, she went to sit down at about a 45 degree angle when she felt like her legs were going to give out.  She was gently lowered to the ground onto her knees but her ankle did twist backwards and her body weight went on top of it.  She continues to complain of left ankle pain.  Family verbalizes that patient did not fall onto her knees but was gently lowered.  No pain medications were given prior to arrival.  Daughter states that she thinks her mother seemed \"off\" for the last day or two.  No recent illnesses.      Ankle Injury  Associated symptoms: no abdominal pain, no chest pain, no diarrhea, no fever, no nausea, no shortness of breath and no vomiting        Review of Systems   Constitutional:  Negative for chills and fever.   Respiratory:  Negative for shortness of breath.    Cardiovascular:  Negative for chest pain.   Gastrointestinal:  Negative for abdominal pain, diarrhea, nausea and vomiting.   Musculoskeletal:  Positive for arthralgias (left ankle, right knee).   All other systems reviewed and are negative.          Objective       ED Triage Vitals   Temperature Pulse Blood Pressure Respirations " SpO2 Patient Position - Orthostatic VS   12/13/24 1226 12/13/24 1226 12/13/24 1226 12/13/24 1226 12/13/24 1226 12/13/24 1226   97.8 °F (36.6 °C) 73 148/76 17 97 % Lying      Temp Source Heart Rate Source BP Location FiO2 (%) Pain Score    12/13/24 1226 12/13/24 1226 12/13/24 1226 -- 12/13/24 1357    Oral Monitor Right arm  10 - Worst Possible Pain      Vitals      Date and Time Temp Pulse SpO2 Resp BP Pain Score FACES Pain Rating User   12/13/24 1655 -- -- -- -- -- 10 - Worst Possible Pain -- TF   12/13/24 1415 -- 84 97 % 18 165/70 -- -- TF   12/13/24 1357 -- -- -- -- -- 10 - Worst Possible Pain -- TF   12/13/24 1330 -- 74 97 % -- -- -- -- SB   12/13/24 1226 97.8 °F (36.6 °C) 73 97 % 17 148/76 -- -- AC            Physical Exam  Vitals and nursing note reviewed.   Constitutional:       Appearance: Normal appearance.   HENT:      Head: Normocephalic and atraumatic.   Cardiovascular:      Rate and Rhythm: Normal rate.      Pulses: Normal pulses.      Heart sounds: Normal heart sounds.   Pulmonary:      Effort: Pulmonary effort is normal.      Breath sounds: Normal breath sounds.   Abdominal:      General: Abdomen is flat. Bowel sounds are normal.      Palpations: Abdomen is soft.      Tenderness: There is no abdominal tenderness.   Musculoskeletal:         General: Normal range of motion.   Skin:     General: Skin is warm and dry.      Capillary Refill: Capillary refill takes less than 2 seconds.          Neurological:      General: No focal deficit present.      Mental Status: She is alert and oriented to person, place, and time.   Psychiatric:         Mood and Affect: Mood normal.         Behavior: Behavior normal.         Results Reviewed       Procedure Component Value Units Date/Time    HS Troponin 0hr (reflex protocol) [[id number]]  (Normal) Collected: 12/13/24 1558    Lab Status: Final result Specimen: Blood from Hand, Right Updated: 12/13/24 1641     hs TnI 0hr 20 ng/L     HS Troponin I 2hr [[id number]]     Lab  Status: No result Specimen: Blood     HS Troponin I 4hr [[id number]]     Lab Status: No result Specimen: Blood     Basic metabolic panel [[id number]]  (Abnormal) Collected: 12/13/24 1558    Lab Status: Final result Specimen: Blood from Hand, Right Updated: 12/13/24 1637     Sodium 141 mmol/L      Potassium 4.1 mmol/L      Chloride 107 mmol/L      CO2 24 mmol/L      ANION GAP 10 mmol/L      BUN 26 mg/dL      Creatinine 1.13 mg/dL      Glucose 106 mg/dL      Calcium 9.7 mg/dL      eGFR 41 ml/min/1.73sq m     Narrative:      National Kid[employer] Disease Foundation guidelines for Chronic Kidney Disease (CKD):     Stage 1 with normal or high GFR (GFR > 90 mL/min/1.73 square meters)    Stage 2 Mild CKD (GFR = 60-89 mL/min/1.73 square meters)    Stage 3A Moderate CKD (GFR = 45-59 mL/min/1.73 square meters)    Stage 3B Moderate CKD (GFR = 30-44 mL/min/1.73 square meters)    Stage 4 Severe CKD (GFR = 15-29 mL/min/1.73 square meters)    Stage 5 End Stage CKD (GFR <15 mL/min/1.73 square meters)  Note: GFR calculation is accurate only with a steady state creatinine    TSH [[id number]] Collected: 12/13/24 1558    Lab Status: In process Specimen: Blood from Hand, Right Updated: 12/13/24 1611    CBC and differential [[id number]]  (Abnormal) Collected: 12/13/24 1422    Lab Status: Final result Specimen: Blood from Hand, Right Updated: 12/13/24 1430     WBC 11.85 Thousand/uL      RBC 4.45 Million/uL      Hemoglobin 12.5 g/dL      Hematocrit 39.8 %      MCV 89 fL      MCH 28.1 pg      MCHC 31.4 g/dL      RDW 15.5 %      MPV 9.0 fL      Platelets 256 Thousands/uL      nRBC 0 /100 WBCs      Segmented % 85 %      Immature Grans % 0 %      Lymphocytes % 8 %      Monocytes % 7 %      Eosinophils Relative 0 %      Basophils Relative 0 %      Absolute Neutrophils 10.06 Thousands/µL      Absolute Immature Grans 0.03 Thousand/uL      Absolute Lymphocytes 0.89 Thousands/µL      Absolute Monocytes 0.83 Thousand/µL      Eosinophils Absolute 0.02  Thousand/µL      Basophils Absolute 0.02 Thousands/µL     UA w Reflex to Microscopic w Reflex to Culture [[id number]]     Lab Status: No result Specimen: Urine             XR knee 4+ views Right injury   ED Interpretation by KEVIN Hurtado (12/13 3875)   No obvious fracture noted on my interpretation of x-ray.      Final Interpretation by Irvin Marte MD (12/13 2792)      No acute osseous abnormality. Intact arthroplasty components.         Computerized Assisted Algorithm (CAA) may have been used to analyze all applicable images.         Workstation performed: [id number]         XR ankle 3+ views LEFT   ED Interpretation by KEVIN Hurtado (12/13 1340)   Oblique distal left fibular fracture noted on my interpretation of x-ray.      Final Interpretation by Huyen Gannon MD (12/13 1441)   Bimalleolar fracture as above, with probable early healing.   Minimal lateral tibiotalar subluxation.   Chronic findings, as above.         Computerized Assisted Algorithm (CAA) may have been used to analyze all applicable images.               Workstation performed: [id number]             Splint application    Date/Time: 12/13/2024 3:45 PM    Performed by: KEVIN Hurtado  Authorized by: KEVIN Hurtado  Universal Protocol:  Consent: Verbal consent obtained.  Consent given by: patient  Patient understanding: patient states understanding of the procedure being performed  Radiology Images displayed and confirmed. If images not available, report reviewed: imaging studies available  Patient identity confirmed: verbally with patient    Pre-procedure details:     Sensation:  Normal    Skin color:  Healing wound to top/medial of shin and mid medial shin.  Procedure details:     Laterality:  Left    Location:  Ankle    Ankle:  L ankle    Splint type:  Sugar tong (posterior short)    Supplies:  Cotton padding, Ortho-Glass and elastic bandage  Post-procedure details:     Pain:   Unchanged    Sensation:  Normal    Skin color:  Healing wound to top/medial of shin and mid medial shin.    Patient tolerance of procedure:  Tolerated well, no immediate complications  ECG 12 Lead Documentation Only    Date/Time: 12/13/2024 4:48 PM    Performed by: KEVIN Hurtado  Authorized by: KEVIN Hurtado    Indications / Diagnosis:  Weakness  ECG reviewed by me, the ED Provider: yes    Patient location:  ED  Previous ECG:     Previous ECG:  Compared to current    Comparison ECG info:  NSR, HR=68, RBBB, left axis shift  Rate:     ECG rate:  70    ECG rate assessment: normal    Rhythm:     Rhythm: sinus rhythm    Ectopy:     Ectopy: none    QRS:     QRS axis:  Left  Conduction:     Conduction: normal    ST segments:     ST segments:  Normal  T waves:     T waves: inverted      Inverted:  V5, III and V1      ED Medication and Procedure Management   Prior to Admission Medications   Prescriptions Last Dose Informant Patient Reported? Taking?   Multiple Vitamins-Minerals (PreserVision AREDS 2+Multi Vit) CAPS  Self, Care Giver Yes No   Sig: Take by mouth   acetaminophen (TYLENOL) 500 mg tablet  Self, Care Giver No No   Sig: Take 1 tablet (500 mg total) by mouth every 6 (six) hours as needed for mild pain   albuterol (2.5 mg/3 mL) 0.083 % nebulizer solution   No No   Sig: Take 3 mL (2.5 mg total) by nebulization every 6 (six) hours as needed for wheezing or shortness of breath   albuterol (Ventolin HFA) 90 mcg/act inhaler   No No   Sig: Inhale 2 puffs every 6 (six) hours as needed for wheezing   benzonatate (TESSALON) 200 MG capsule   No No   Sig: Take 1 capsule (200 mg total) by mouth 3 (three) times a day as needed for cough   enalapril (VASOTEC) 10 mg tablet  Self, Care Giver No No   Sig: Take 1 tablet (10 mg total) by mouth daily   escitalopram (LEXAPRO) 5 mg tablet   No No   Sig: Take 1 tablet (5 mg total) by mouth daily   fluticasone (FLONASE) 50 mcg/act nasal spray  Self, Care Giver No No    Si sprays into each nostril daily   furosemide (LASIX) 20 mg tablet   No No   Sig: Take one tab po every other day   meloxicam (Mobic) 15 mg tablet  Self, Care Giver No No   Sig: Take 1 tablet (15 mg total) by mouth daily   mupirocin (BACTROBAN) 2 % ointment  Self, Care Giver No No   Sig: Apply topically 2 (two) times a day for 14 days   omeprazole (PriLOSEC) 20 mg delayed release capsule   No No   Sig: Take 1 capsule (20 mg total) by mouth daily before breakfast   potassium chloride (MICRO-K) 10 MEQ CR capsule   No No   Sig: Take 2 tabs po when you take Furosemide.      Facility-Administered Medications: None     Patient's Medications   Discharge Prescriptions    No medications on file       ED SEPSIS DOCUMENTATION   Time reflects when diagnosis was documented in both MDM as applicable and the Disposition within this note       Time User Action Codes Description Comment    2024  1:24 PM Allie Stevenson Add [S82.409A] Fibula fracture     2024  1:24 PM Allie Stevenson Add [R29.898] Weakness of both lower extremities     2024  5:10 PM Allie Stevenson Modify [S82.409A] Fibula fracture     2024  5:10 PM Allie Stevenson Modify [R29.898] Weakness of both lower extremities     2024  5:20 PM Allie Stevenson Add [R26.2] Ambulatory dysfunction                  KEVIN Hurtado  24 1719       KEVIN Hurtado  24 1721       KEVIN Hurtado  24 1725

## 2024-12-13 NOTE — ED NOTES
Attempted x2 for IV insertion. Unsuccessful. Charge RN made aware.     Ninoska Gil RN  12/13/24 [id number]

## 2024-12-14 DIAGNOSIS — S82.842A ANKLE FRACTURE, BIMALLEOLAR, CLOSED, LEFT, INITIAL ENCOUNTER: Primary | ICD-10-CM

## 2024-12-14 PROBLEM — I45.4 IVCD (INTRAVENTRICULAR CONDUCTION DEFECT): Status: ACTIVE | Noted: 2022-08-24

## 2024-12-14 PROBLEM — M10.9 ACUTE GOUT INVOLVING TOE OF RIGHT FOOT: Status: ACTIVE | Noted: 2021-09-20

## 2024-12-14 LAB
ALBUMIN SERPL BCG-MCNC: 3.5 G/DL (ref 3.5–5)
ALP SERPL-CCNC: 61 U/L (ref 34–104)
ALT SERPL W P-5'-P-CCNC: 9 U/L (ref 7–52)
ANION GAP SERPL CALCULATED.3IONS-SCNC: 5 MMOL/L (ref 4–13)
AST SERPL W P-5'-P-CCNC: 20 U/L (ref 13–39)
ATRIAL RATE: 70 BPM
BASOPHILS # BLD AUTO: 0.02 THOUSANDS/ÂΜL (ref 0–0.1)
BASOPHILS NFR BLD AUTO: 0 % (ref 0–1)
BILIRUB SERPL-MCNC: 0.82 MG/DL (ref 0.2–1)
BUN SERPL-MCNC: 27 MG/DL (ref 5–25)
CALCIUM SERPL-MCNC: 8.8 MG/DL (ref 8.4–10.2)
CHLORIDE SERPL-SCNC: 110 MMOL/L (ref 96–108)
CO2 SERPL-SCNC: 25 MMOL/L (ref 21–32)
CREAT SERPL-MCNC: 1.17 MG/DL (ref 0.6–1.3)
EOSINOPHIL # BLD AUTO: 0.1 THOUSAND/ÂΜL (ref 0–0.61)
EOSINOPHIL NFR BLD AUTO: 1 % (ref 0–6)
ERYTHROCYTE [DISTWIDTH] IN BLOOD BY AUTOMATED COUNT: 15.2 % (ref 11.6–15.1)
GFR SERPL CREATININE-BSD FRML MDRD: 39 ML/MIN/1.73SQ M
GLUCOSE P FAST SERPL-MCNC: 101 MG/DL (ref 65–99)
GLUCOSE SERPL-MCNC: 101 MG/DL (ref 65–140)
HCT VFR BLD AUTO: 32.1 % (ref 34.8–46.1)
HGB BLD-MCNC: 10.3 G/DL (ref 11.5–15.4)
IMM GRANULOCYTES # BLD AUTO: 0.04 THOUSAND/UL (ref 0–0.2)
IMM GRANULOCYTES NFR BLD AUTO: 0 % (ref 0–2)
LYMPHOCYTES # BLD AUTO: 1.45 THOUSANDS/ÂΜL (ref 0.6–4.47)
LYMPHOCYTES NFR BLD AUTO: 16 % (ref 14–44)
MAGNESIUM SERPL-MCNC: 2.1 MG/DL (ref 1.9–2.7)
MCH RBC QN AUTO: 28.6 PG (ref 26.8–34.3)
MCHC RBC AUTO-ENTMCNC: 32.1 G/DL (ref 31.4–37.4)
MCV RBC AUTO: 89 FL (ref 82–98)
MONOCYTES # BLD AUTO: 0.82 THOUSAND/ÂΜL (ref 0.17–1.22)
MONOCYTES NFR BLD AUTO: 9 % (ref 4–12)
NEUTROPHILS # BLD AUTO: 6.8 THOUSANDS/ÂΜL (ref 1.85–7.62)
NEUTS SEG NFR BLD AUTO: 74 % (ref 43–75)
NRBC BLD AUTO-RTO: 0 /100 WBCS
P AXIS: 30 DEGREES
PHOSPHATE SERPL-MCNC: 3.9 MG/DL (ref 2.3–4.1)
PLATELET # BLD AUTO: 214 THOUSANDS/UL (ref 149–390)
PMV BLD AUTO: 9 FL (ref 8.9–12.7)
POTASSIUM SERPL-SCNC: 4.2 MMOL/L (ref 3.5–5.3)
PR INTERVAL: 306 MS
PROT SERPL-MCNC: 5.8 G/DL (ref 6.4–8.4)
QRS AXIS: -56 DEGREES
QRSD INTERVAL: 140 MS
QT INTERVAL: 442 MS
QTC INTERVAL: 477 MS
RBC # BLD AUTO: 3.6 MILLION/UL (ref 3.81–5.12)
SODIUM SERPL-SCNC: 140 MMOL/L (ref 135–147)
T WAVE AXIS: 25 DEGREES
TSH SERPL DL<=0.05 MIU/L-ACNC: 2.37 UIU/ML (ref 0.45–4.5)
URATE SERPL-MCNC: 8.2 MG/DL (ref 2–7.5)
VENTRICULAR RATE: 70 BPM
WBC # BLD AUTO: 9.23 THOUSAND/UL (ref 4.31–10.16)

## 2024-12-14 PROCEDURE — 84100 ASSAY OF PHOSPHORUS: CPT

## 2024-12-14 PROCEDURE — 93010 ELECTROCARDIOGRAM REPORT: CPT | Performed by: INTERNAL MEDICINE

## 2024-12-14 PROCEDURE — 99204 OFFICE O/P NEW MOD 45 MIN: CPT

## 2024-12-14 PROCEDURE — 84443 ASSAY THYROID STIM HORMONE: CPT

## 2024-12-14 PROCEDURE — 85025 COMPLETE CBC W/AUTO DIFF WBC: CPT

## 2024-12-14 PROCEDURE — 83735 ASSAY OF MAGNESIUM: CPT

## 2024-12-14 PROCEDURE — 99233 SBSQ HOSP IP/OBS HIGH 50: CPT | Performed by: FAMILY MEDICINE

## 2024-12-14 PROCEDURE — 84550 ASSAY OF BLOOD/URIC ACID: CPT

## 2024-12-14 PROCEDURE — 80053 COMPREHEN METABOLIC PANEL: CPT

## 2024-12-14 RX ORDER — HYDROMORPHONE HCL IN WATER/PF 6 MG/30 ML
0.2 PATIENT CONTROLLED ANALGESIA SYRINGE INTRAVENOUS EVERY 4 HOURS PRN
Status: DISCONTINUED | OUTPATIENT
Start: 2024-12-14 | End: 2024-12-14

## 2024-12-14 RX ORDER — HYDROMORPHONE HCL IN WATER/PF 6 MG/30 ML
0.2 PATIENT CONTROLLED ANALGESIA SYRINGE INTRAVENOUS EVERY 4 HOURS PRN
Status: DISCONTINUED | OUTPATIENT
Start: 2024-12-14 | End: 2024-12-19 | Stop reason: HOSPADM

## 2024-12-14 RX ORDER — COLCHICINE 0.6 MG/1
0.6 TABLET ORAL DAILY
Status: DISCONTINUED | OUTPATIENT
Start: 2024-12-14 | End: 2024-12-14

## 2024-12-14 RX ORDER — COLCHICINE 0.6 MG/1
0.6 TABLET ORAL EVERY OTHER DAY
Status: DISCONTINUED | OUTPATIENT
Start: 2024-12-14 | End: 2024-12-19 | Stop reason: HOSPADM

## 2024-12-14 RX ORDER — FLUTICASONE PROPIONATE 50 MCG
1 SPRAY, SUSPENSION (ML) NASAL DAILY
Status: DISCONTINUED | OUTPATIENT
Start: 2024-12-14 | End: 2024-12-19 | Stop reason: HOSPADM

## 2024-12-14 RX ORDER — OXYCODONE HYDROCHLORIDE 5 MG/1
5 TABLET ORAL EVERY 6 HOURS PRN
Refills: 0 | Status: DISCONTINUED | OUTPATIENT
Start: 2024-12-14 | End: 2024-12-15

## 2024-12-14 RX ORDER — ACETAMINOPHEN 325 MG/1
975 TABLET ORAL EVERY 8 HOURS SCHEDULED
Status: DISCONTINUED | OUTPATIENT
Start: 2024-12-14 | End: 2024-12-19 | Stop reason: HOSPADM

## 2024-12-14 RX ADMIN — LISINOPRIL 20 MG: 20 TABLET ORAL at 08:59

## 2024-12-14 RX ADMIN — ENOXAPARIN SODIUM 40 MG: 40 INJECTION SUBCUTANEOUS at 09:01

## 2024-12-14 RX ADMIN — DEXTRAN 70, GLYCERIN, HYPROMELLOSE 1 DROP: 1; 2; 3 SOLUTION/ DROPS OPHTHALMIC at 11:01

## 2024-12-14 RX ADMIN — FLUTICASONE PROPIONATE 1 SPRAY: 50 SPRAY, METERED NASAL at 11:01

## 2024-12-14 RX ADMIN — DICLOFENAC SODIUM 2 G: 10 GEL TOPICAL at 17:43

## 2024-12-14 RX ADMIN — DICLOFENAC SODIUM 2 G: 10 GEL TOPICAL at 11:01

## 2024-12-14 RX ADMIN — NYSTATIN: 100000 POWDER TOPICAL at 17:42

## 2024-12-14 RX ADMIN — ESCITALOPRAM OXALATE 5 MG: 10 TABLET ORAL at 08:59

## 2024-12-14 RX ADMIN — NYSTATIN: 100000 POWDER TOPICAL at 09:00

## 2024-12-14 RX ADMIN — COLCHICINE 0.6 MG: 0.6 TABLET ORAL at 17:41

## 2024-12-14 RX ADMIN — DICLOFENAC SODIUM 2 G: 10 GEL TOPICAL at 08:00

## 2024-12-14 RX ADMIN — ACETAMINOPHEN 975 MG: 325 TABLET, FILM COATED ORAL at 17:41

## 2024-12-14 NOTE — DISCHARGE INSTR - AVS FIRST PAGE
Discharge Instructions - Orthopedics    Weight Bearing Status:                                           NWB left lower extremity    DVT prophylaxis:  Per primary    Pain:  Continue analgesics as directed    Dressing Instructions:   Please keep clean, dry and intact until follow up. DO NOT GET SPLINT WET    Appt Instructions:   Follow up in clinic with Dr Navarro in 1 week to discuss surgery versus continued non-operative management  If you do not have your appointment, please call the clinic at [phone]

## 2024-12-14 NOTE — PROGRESS NOTES
Progress Note - Hospitalist   Name: Diya Garcia 89 y.o. female I MRN: [id number]  Unit/Bed#: 2 E 266-01 I Date of Admission: 12/13/2024   Date of Service: 12/14/2024 I Hospital Day: 0    Assessment & Plan  Fracture of left fibula  Presents in the emergency department accompanied by daughter due to ambulatory dysfunction.  Mrs. Matson lives alone and caregiver noted patient was not able to ambulate, also noted last 24 hours unsteady gait, moderate-severe pain in left lower ankle  In the ER, x-ray ankle 3+ views shows bimalleolar fracture above the level of the syndesmosis with a small amount of bony callus, transverse medial malleolus fracture minimally displaced, with minimal lateral tibial talar subluxation  X-ray knee 4 views right is negative for acute findings  Blood work in the ER is unremarkable, there is a mild leukocytosis likely to be reactive  Etiology of fracture could be multifactorial  Per ED provider orthopedics were consulted and no immediate intervention needed, recommend to follow-up outpatient.  In the ER stirrup/posterior short leg splint place  Require a dose of fentanyl due to pain, continue with Dilaudid, oxy and Tylenol for pain and nonpharmacological interventions for discomfort  Fall precautions  Weightbearing as tolerated  PT for further evaluation and treatment  Ambulatory dysfunction  Daughter at bedside reports the last week her mother has been having some issues with mobility, mobilize at home with a roller walking  On 12/12/2024 the patient is left all night in a recliner and was not able to stand up on her own until caregiver came to be seen in the morning  Baseline is pretty much independent with roller walker, however in the last 24-48 hours she is requiring extensive assistance to mobilize  Every 2 hours turns  Fall precautions  PT for further evaluation and treatment  Edema of right lower leg due to peripheral venous insufficiency  Appears to be chronic  Med reconciliation shows  "patient takes furosemide 20 mg every other day, continue with furosemide 20 mg daily while hospitalized  On assessment there is +2 edema in right lower extremity  Last echocardiogram in 2021 shows an EF of 60% with a normal systolic function, no wall motion abnormalities, wall thickness was mildly increased and there was a mild concentric hypertrophy  Patient does not have a history of heart failure and does not appear hypervolemic  Echo ordered and will be followed  AILYN stockings on right leg, left has a cast  Elevate extremities  Daily weights  Consider outpatient cardiology follow-up if echo wnl  Acute gout involving toe of right foot  Noted wound on right foot, third digit  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 Not an infection. Cont treating for gout\"  Start colchicine based on Crcl of 30- 0.6mg daily  Intermittent asthma without complication, unspecified asthma severity  O2 sats greater than 92%, on room air, she is in no acute distress  Continue with albuterol as needed  Follows up with family medicine last visit 11/19/2024  Stable  IVCD (intraventricular conduction defect)  EKG shows IN interval of 306- 1st degree AV block  Also has IVCD  Unspecified open wound, left lower leg, sequela  Wound care    VTE Pharmacologic Prophylaxis: VTE Score: 6 High Risk (Score >/= 5) - Pharmacological DVT Prophylaxis Ordered: enoxaparin (Lovenox). Sequential Compression Devices Ordered.    Mobility:   Basic Mobility Inpatient Raw Score: 6  JH-HLM Goal: 2: Bed activities/Dependent transfer  JH-HLM Achieved: 2: Bed activities/Dependent transfer  JH-HLM Goal achieved. Continue to encourage appropriate mobility.    Patient Centered Rounds: I performed bedside rounds with nursing staff today.   Discussions with Specialists or Other Care Team Provider: ortho, PT OT    Education and Discussions with Family / Patient: Updated  (daughter) via phone.    Current " Length of Stay: 0 day(s)  Current Patient Status: Observation   Certification Statement: The patient will continue to require additional inpatient hospital stay due to ortho consult and podiatry consult, wound care and pain control and PT OT consult  Discharge Plan:  tbd based on further consults    Code Status: Level 1 - Full Code    Subjective   Pt seen and examined at bedside during am rounds  Pt is very hard of hearing  Very pleasant and not in any acute pain at rest  Pain increases on movement  No fever or cp or sob    Objective :  Temp:  [97.8 °F (36.6 °C)-100.2 °F (37.9 °C)] 99.8 °F (37.7 °C)  HR:  [73-95] 88  BP: (118-165)/(56-76) 120/64  Resp:  [17-22] 18  SpO2:  [92 %-97 %] 92 %  O2 Device: None (Room air)    Body mass index is 30.34 kg/m².     Input and Output Summary (last 24 hours):   No intake or output data in the 24 hours ending 12/14/24 0902    Physical Exam  S1,2(+) R, lungs CTA, left foot and ankle/leg in a splint, difficult ROM and painful on palpation, A&Ox3, cooperative, no delirium noted, left third toe is swollen and appears like gout as it appears tophus like with redness on it, no facial droop or slurred speech, hard of hearing , poor dentition, sensations intact b/l LE, abd nt nd bs (+) 4 quads, oral mucosa moist, NC AT, PERRLA, no jaundice    Lines/Drains:              Lab Results: I have reviewed the following results:   Results from last 7 days   Lab Units 12/14/24  0559   WBC Thousand/uL 9.23   HEMOGLOBIN g/dL 10.3*   HEMATOCRIT % 32.1*   PLATELETS Thousands/uL 214   SEGS PCT % 74   LYMPHO PCT % 16   MONO PCT % 9   EOS PCT % 1     Results from last 7 days   Lab Units 12/14/24  0559   SODIUM mmol/L 140   POTASSIUM mmol/L 4.2   CHLORIDE mmol/L 110*   CO2 mmol/L 25   BUN mg/dL 27*   CREATININE mg/dL 1.17   ANION GAP mmol/L 5   CALCIUM mg/dL 8.8   ALBUMIN g/dL 3.5   TOTAL BILIRUBIN mg/dL 0.82   ALK PHOS U/L 61   ALT U/L 9   AST U/L 20   GLUCOSE RANDOM mg/dL 101                       Recent  Cultures (last 7 days):         Imaging Results Review: I reviewed radiology reports from this admission including: xr knees and xr ankles.  Other Study Results Review: EKG was reviewed.     Last 24 Hours Medication List:     Current Facility-Administered Medications:     acetaminophen (TYLENOL) tablet 975 mg, Q6H PRN    albuterol inhalation solution 2.5 mg, Q6H PRN    aluminum-magnesium hydroxide-simethicone (MAALOX) oral suspension 30 mL, Q6H PRN    benzonatate (TESSALON PERLES) capsule 200 mg, TID PRN    Diclofenac Sodium (VOLTAREN) 1 % topical gel 2 g, 4x Daily    enoxaparin (LOVENOX) subcutaneous injection 40 mg, Daily    escitalopram (LEXAPRO) tablet 5 mg, Daily    furosemide (LASIX) tablet 20 mg, Daily    HYDROmorphone (DILAUDID) injection 0.5 mg, Q4H PRN    lisinopril (ZESTRIL) tablet 20 mg, Daily    melatonin tablet 3 mg, HS PRN    nystatin (MYCOSTATIN) powder, BID    ondansetron (ZOFRAN) injection 4 mg, Q6H PRN    oxyCODONE (ROXICODONE) IR tablet 5 mg, Q6H PRN    polyethylene glycol (MIRALAX) packet 17 g, Daily PRN    Administrative Statements   Today, Patient Was Seen By: Feroz Arreola MD  I have spent a total time of 51 minutes in caring for this patient on the day of the visit/encounter including Diagnostic results, Prognosis, Risks and benefits of tx options, Instructions for management, Patient and family education, Importance of tx compliance, Risk factor reductions, Impressions, Counseling / Coordination of care, Documenting in the medical record, Reviewing / ordering tests, medicine, procedures  , Obtaining or reviewing history  , and Communicating with other healthcare professionals .    **Please Note: This note may have been constructed using a voice recognition system.**

## 2024-12-14 NOTE — PLAN OF CARE
Problem: PAIN - ADULT  Goal: Verbalizes/displays adequate comfort level or baseline comfort level  Description: Interventions:  - Encourage patient to monitor pain and request assistance  - Assess pain using appropriate pain scale  - Administer analgesics based on type and severity of pain and evaluate response  - Implement non-pharmacological measures as appropriate and evaluate response  - Consider cultural and social influences on pain and pain management  - Notify physician/advanced practitioner if interventions unsuccessful or patient reports new pain  Outcome: Progressing     Problem: INFECTION - ADULT  Goal: Absence or prevention of progression during hospitalization  Description: INTERVENTIONS:  - Assess and monitor for signs and symptoms of infection  - Monitor lab/diagnostic results  - Monitor all insertion sites, i.e. indwelling lines, tubes, and drains  - Monitor endotracheal if appropriate and nasal secretions for changes in amount and color  - [place] appropriate cooling/warming therapies per order  - Administer medications as ordered  - Instruct and encourage patient and family to use good hand hygiene technique  - Identify and instruct in appropriate isolation precautions for identified infection/condition  Outcome: Progressing  Goal: Absence of fever/infection during neutropenic period  Description: INTERVENTIONS:  - Monitor WBC    Outcome: Progressing     Problem: SAFETY ADULT  Goal: Patient will remain free of falls  Description: INTERVENTIONS:  - Educate patient/family on patient safety including physical limitations  - Instruct patient to call for assistance with activity   - Consult OT/PT to assist with strengthening/mobility   - Keep Call bell within reach  - Keep bed low and locked with side rails adjusted as appropriate  - Keep care items and personal belongings within reach  - Initiate and maintain comfort rounds  - Make Fall Risk Sign visible to staff  - Offer Toileting every 2 Hours,  in advance of need  - Initiate/Maintain bed alarm  - Obtain necessary fall risk management equipment:   - Apply yellow socks and bracelet for high fall risk patients  - Consider moving patient to room near nurses station  Outcome: Progressing  Goal: Maintain or return to baseline ADL function  Description: INTERVENTIONS:  -  Assess patient's ability to carry out ADLs; assess patient's baseline for ADL function and identify physical deficits which impact ability to perform ADLs (bathing, care of mouth/teeth, toileting, grooming, dressing, etc.)  - Assess/evaluate cause of self-care deficits   - Assess range of motion  - Assess patient's mobility; develop plan if impaired  - Assess patient's need for assistive devices and provide as appropriate  - Encourage maximum independence but intervene and supervise when necessary  - Involve family in performance of ADLs  - Assess for home care needs following discharge   - Consider OT consult to assist with ADL evaluation and planning for discharge  - Provide patient education as appropriate  Outcome: Progressing  Goal: Maintains/Returns to pre admission functional level  Description: INTERVENTIONS:  - Perform AM-PAC 6 Click Basic Mobility/ Daily Activity assessment daily.  - Set and communicate daily mobility goal to care team and patient/family/caregiver.   - Collaborate with rehabilitation services on mobility goals if consulted  - Perform Range of Motion 4 times a day.  - Reposition patient every 2 hours.  - Dangle patient 4 times a day  - Stand patient  times a day  - Ambulate patient 4 times a day  - Out of bed to chair 4 times a day   - Out of bed for meals 4 times a day  - Out of bed for toileting  - Record patient progress and toleration of activity level   Outcome: Progressing     Problem: DISCHARGE PLANNING  Goal: Discharge to home or other facility with appropriate resources  Description: INTERVENTIONS:  - Identify barriers to discharge w/patient and caregiver  -  Arrange for needed discharge resources and transportation as appropriate  - Identify discharge learning needs (meds, wound care, etc.)  - Arrange for interpretive services to assist at discharge as needed  - Refer to Case Management Department for coordinating discharge planning if the patient needs post-hospital services based on physician/advanced practitioner order or complex needs related to functional status, cognitive ability, or social support system  Outcome: Progressing     Problem: Knowledge Deficit  Goal: Patient/family/caregiver demonstrates understanding of disease process, treatment plan, medications, and discharge instructions  Description: Complete learning assessment and assess knowledge base.  Interventions:  - Provide teaching at level of understanding  - Provide teaching via preferred learning methods  Outcome: Progressing     Problem: MUSCULOSKELETAL - ADULT  Goal: Maintain or return mobility to safest level of function  Description: INTERVENTIONS:  - Assess patient's ability to carry out ADLs; assess patient's baseline for ADL function and identify physical deficits which impact ability to perform ADLs (bathing, care of mouth/teeth, toileting, grooming, dressing, etc.)  - Assess/evaluate cause of self-care deficits   - Assess range of motion  - Assess patient's mobility  - Assess patient's need for assistive devices and provide as appropriate  - Encourage maximum independence but intervene and supervise when necessary  - Involve family in performance of ADLs  - Assess for home care needs following discharge   - Consider OT consult to assist with ADL evaluation and planning for discharge  - Provide patient education as appropriate  Outcome: Progressing  Goal: Maintain proper alignment of affected body part  Description: INTERVENTIONS:  - Support, maintain and protect limb and body alignment  - Provide patient/ family with appropriate education  Outcome: Progressing

## 2024-12-14 NOTE — PLAN OF CARE
Problem: PAIN - ADULT  Goal: Verbalizes/displays adequate comfort level or baseline comfort level  Description: Interventions:  - Encourage patient to monitor pain and request assistance  - Assess pain using appropriate pain scale  - Administer analgesics based on type and severity of pain and evaluate response  - Implement non-pharmacological measures as appropriate and evaluate response  - Consider cultural and social influences on pain and pain management  - Notify physician/advanced practitioner if interventions unsuccessful or patient reports new pain  Outcome: Progressing     Problem: INFECTION - ADULT  Goal: Absence or prevention of progression during hospitalization  Description: INTERVENTIONS:  - Assess and monitor for signs and symptoms of infection  - Monitor lab/diagnostic results  - Monitor all insertion sites, i.e. indwelling lines, tubes, and drains  - Monitor endotracheal if appropriate and nasal secretions for changes in amount and color  - [place] appropriate cooling/warming therapies per order  - Administer medications as ordered  - Instruct and encourage patient and family to use good hand hygiene technique  - Identify and instruct in appropriate isolation precautions for identified infection/condition  Outcome: Progressing  Goal: Absence of fever/infection during neutropenic period  Description: INTERVENTIONS:  - Monitor WBC    Outcome: Progressing     Problem: SAFETY ADULT  Goal: Patient will remain free of falls  Description: INTERVENTIONS:  - Educate patient/family on patient safety including physical limitations  - Instruct patient to call for assistance with activity   - Consult OT/PT to assist with strengthening/mobility   - Keep Call bell within reach  - Keep bed low and locked with side rails adjusted as appropriate  - Keep care items and personal belongings within reach  - Initiate and maintain comfort rounds  - Make Fall Risk Sign visible to staff  - Offer Toileting every  Hours,  in advance of need  - Initiate/Maintain alarm  - Obtain necessary fall risk management equipment:   - Apply yellow socks and bracelet for high fall risk patients  - Consider moving patient to room near nurses station  Outcome: Progressing  Goal: Maintain or return to baseline ADL function  Description: INTERVENTIONS:  -  Assess patient's ability to carry out ADLs; assess patient's baseline for ADL function and identify physical deficits which impact ability to perform ADLs (bathing, care of mouth/teeth, toileting, grooming, dressing, etc.)  - Assess/evaluate cause of self-care deficits   - Assess range of motion  - Assess patient's mobility; develop plan if impaired  - Assess patient's need for assistive devices and provide as appropriate  - Encourage maximum independence but intervene and supervise when necessary  - Involve family in performance of ADLs  - Assess for home care needs following discharge   - Consider OT consult to assist with ADL evaluation and planning for discharge  - Provide patient education as appropriate  Outcome: Progressing  Goal: Maintains/Returns to pre admission functional level  Description: INTERVENTIONS:  - Perform AM-PAC 6 Click Basic Mobility/ Daily Activity assessment daily.  - Set and communicate daily mobility goal to care team and patient/family/caregiver.   - Collaborate with rehabilitation services on mobility goals if consulted  - Perform Range of Motion  times a day.  - Reposition patient every  hours.  - Dangle patient  times a day  - Stand patient  times a day  - Ambulate patient  times a day  - Out of bed to chair  times a day   - Out of bed for meals  times a day  - Out of bed for toileting  - Record patient progress and toleration of activity level   Outcome: Progressing     Problem: DISCHARGE PLANNING  Goal: Discharge to home or other facility with appropriate resources  Description: INTERVENTIONS:  - Identify barriers to discharge w/patient and caregiver  - Arrange for  needed discharge resources and transportation as appropriate  - Identify discharge learning needs (meds, wound care, etc.)  - Arrange for interpretive services to assist at discharge as needed  - Refer to Case Management Department for coordinating discharge planning if the patient needs post-hospital services based on physician/advanced practitioner order or complex needs related to functional status, cognitive ability, or social support system  Outcome: Progressing     Problem: Knowledge Deficit  Goal: Patient/family/caregiver demonstrates understanding of disease process, treatment plan, medications, and discharge instructions  Description: Complete learning assessment and assess knowledge base.  Interventions:  - Provide teaching at level of understanding  - Provide teaching via preferred learning methods  Outcome: Progressing     Problem: MUSCULOSKELETAL - ADULT  Goal: Maintain or return mobility to safest level of function  Description: INTERVENTIONS:  - Assess patient's ability to carry out ADLs; assess patient's baseline for ADL function and identify physical deficits which impact ability to perform ADLs (bathing, care of mouth/teeth, toileting, grooming, dressing, etc.)  - Assess/evaluate cause of self-care deficits   - Assess range of motion  - Assess patient's mobility  - Assess patient's need for assistive devices and provide as appropriate  - Encourage maximum independence but intervene and supervise when necessary  - Involve family in performance of ADLs  - Assess for home care needs following discharge   - Consider OT consult to assist with ADL evaluation and planning for discharge  - Provide patient education as appropriate  Outcome: Progressing  Goal: Maintain proper alignment of affected body part  Description: INTERVENTIONS:  - Support, maintain and protect limb and body alignment  - Provide patient/ family with appropriate education  Outcome: Progressing     Problem: Prexisting or High Potential  for Compromised Skin Integrity  Goal: Skin integrity is maintained or improved  Description: INTERVENTIONS:  - Identify patients at risk for skin breakdown  - Assess and monitor skin integrity  - Assess and monitor nutrition and hydration status  - Monitor labs   - Assess for incontinence   - Turn and reposition patient  - Assist with mobility/ambulation  - Relieve pressure over bony prominences  - Avoid friction and shearing  - Provide appropriate hygiene as needed including keeping skin clean and dry  - Evaluate need for skin moisturizer/barrier cream  - Collaborate with interdisciplinary team   - Patient/family teaching  - Consider wound care consult   Outcome: Progressing     Problem: Nutrition/Hydration-ADULT  Goal: Nutrient/Hydration intake appropriate for improving, restoring or maintaining nutritional needs  Description: Monitor and assess patient's nutrition/hydration status for malnutrition. Collaborate with interdisciplinary team and initiate plan and interventions as ordered.  Monitor patient's weight and dietary intake as ordered or per policy. Utilize nutrition screening tool and intervene as necessary. Determine patient's food preferences and provide high-protein, high-caloric foods as appropriate.     INTERVENTIONS:  - Monitor oral intake, urinary output, labs, and treatment plans  - Assess nutrition and hydration status and recommend course of action  - Evaluate amount of meals eaten  - Assist patient with eating if necessary   - Allow adequate time for meals  - Recommend/ encourage appropriate diets, oral nutritional supplements, and vitamin/mineral supplements  - Order, calculate, and assess calorie counts as needed  - Recommend, monitor, and adjust tube feedings and TPN/PPN based on assessed needs  - Assess need for intravenous fluids  - Provide specific nutrition/hydration education as appropriate  - Include patient/family/caregiver in decisions related to nutrition  Outcome: Progressing

## 2024-12-14 NOTE — CONSULTS
Case discussed with primary care team.  Chart reviewed and clinical images.  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 Can paint with betadine and leave open to air. Use shoe gear with wide toe box to WBC is wnl and Uric acid is elevated. No imaging needed at this time. Medicine team treating with colchicine. Patient is to follow up with Podiatry in the outpatient setting when discharged. Will s/o at this time. Please re-consult if there are any new pedal or LE issues.

## 2024-12-14 NOTE — CONSULTS
Consultation - Orthopedics   Name: Diya Garcia 89 y.o. female I MRN: [id number]  Unit/Bed#: 2 E 266-01 I Date of Admission: 12/13/2024   Date of Service: 12/14/2024 I Hospital Day: 0   Inpatient consult to Orthopedic Surgery  Consult performed by: Gurpreet Michelle PA-C  Consult ordered by: Feroz Arreola MD        Physician Requesting Evaluation: Feroz Arreola MD   Reason for Evaluation / Principal Problem: left bimalleolar ankle fracture    Assessment & Plan  Fracture of left fibula  - Left ankle bimalleolar fracture  - No recommendation for inpatient surgical intervention  - Recommend follow up with trauma team in 1 week to discuss operative vs non-operative management  - Contine NWB LLE in AO ankle splint  - Pain control per primary  - DVT ppx per primary  - Dispo: ortho signing off. Plan for outpatient follow up with trauma team. Please reach out with any further questions      Acute gout involving toe of right foot    IVCD (intraventricular conduction defect)    Edema of right lower leg due to peripheral venous insufficiency    Unspecified open wound, left lower leg, sequela    Intermittent asthma without complication, unspecified asthma severity    Ambulatory dysfunction    I have discussed the above management plan in detail with the primary service.     History of Present Illness   HPI: Diya Garcia is a 89 y.o. year old female with consultation for left ankle fracture. History is provided by the patient's daughter who is at bedside. She notes the patient lives alone in a 55+ community and has a few helpers who visit during the day. At baseline she ambulates with rolling walker. On the evening of 12/14, patient was found to be not wanting to bear weight on the left leg and decided to sleep in a reclining chair to avoid having to walk to bed. The next morning she had help to stand but her legs gave out on her and her left foot may have gotten caught underneath her according to the daughter. She continued to not  "want to bear weight on the left leg at which point she was brought in to the ED. Patient is able to verbalize that she has pain along the dorsum of the foot and circumferentially around the ankle. She is unable to note if she had any previous injury, and is unable to recall any of the events of this recent injury. Xrays were taken and patient was placed in a stirrup + posterior leg splint in the ED. She denies numbness or tingling in the foot, denies previous surgery on the distal extremity but daughter states she did have a hip replacement and had moderate difficulty waking up from anesthesia.    Review of Systems significant for findings described in the HPI.  I have reviewed the patient's PMH, PSH, Social History, Family History, Meds, and Allergies    Objective :  Temp:  [98.3 °F (36.8 °C)-100.2 °F (37.9 °C)] 99.9 °F (37.7 °C)  HR:  [78-95] 78  BP: (104-147)/(46-73) 104/46  Resp:  [17-22] 17  SpO2:  [92 %-97 %] 94 %  O2 Device: None (Room air)  Physical ExamOrtho Exam     Left ankle  - Patient resting comfortably in bed  - Short leg AO splint in place  - Exposed skin without signs of erythema or wounds  - Chronic skin changes noted throughout the exposed left lower extremity  - Able to move all 5 digits  - Sensation intact to all exposed distributions  - No calf tenderness  - Brisk capillary refill          Lab Results: I have reviewed the following results:   Recent Labs     12/13/24  1422 12/13/24  1558 12/14/24  0559   WBC 11.85*  --  9.23   HGB 12.5  --  10.3*   HCT 39.8  --  32.1*     --  214   BUN  --  26* 27*   CREATININE  --  1.13 1.17     Blood Culture: No results found for: \"BLOODCX\"  Wound Culture: No results found for: \"WOUNDCULT\"    Imaging Results Review: I personally reviewed the following image studies in PACS and associated radiology reports: xray(s). My interpretation of the radiology images/reports is: xrays of the left ankle demonstrate oblique lateral malleolar fracture above the " syndesmosis with mild callous formation. Also demonstrated is transverse medial malleolar fracture with minimal displacement as well as degenerative changes in the tarsus.

## 2024-12-14 NOTE — H&P
H&P - Hospitalist   Name: Diya Garcia 89 y.o. female I MRN: [id number]  Unit/Bed#: 2 E 266-01 I Date of Admission: 12/13/2024   Date of Service: 12/13/2024 I Hospital Day: 0     Assessment & Plan  Fracture of left fibula  Presents in the emergency department accompanied by daughter due to ambulatory dysfunction.  Mrs. Matson lives alone and caregiver noted patient was not able to ambulate, also noted last 24 hours unsteady gait, moderate-severe pain in left lower ankle  In the ER, x-ray ankle 3+ views shows bimalleolar fracture above the level of the syndesmosis with a small amount of bony callus, transverse medial malleolus fracture minimally displaced, with minimal lateral tibial talar subluxation  X-ray knee 4 views right is negative for acute findings  Blood work in the ER is unremarkable, there is a mild leukocytosis likely to be reactive  Etiology of fracture could be multifactorial  Per ED provider orthopedics were consulted and no immediate intervention needed, recommend to follow-up outpatient.  In the ER stirrup/posterior short leg splint place  Require a dose of fentanyl due to pain, continue with Dilaudid, oxy and Tylenol for pain and nonpharmacological interventions for discomfort  Fall precautions  Weightbearing as tolerated  PT for further evaluation and treatment  Ambulatory dysfunction  Daughter at bedside reports the last week her mother has been having some issues with mobility, mobilize at home with a roller walking  On 12/12/2024 the patient is left all night in a recliner and was not able to stand up on her own until caregiver came to be seen in the morning  Baseline is pretty much independent with roller walker, however in the last 24-48 hours she is requiring extensive assistance to mobilize  Every 2 hours turns  Fall precautions  PT for further evaluation and treatment  Edema of right lower leg due to peripheral venous insufficiency  Appears to be chronic  Med reconciliation shows patient  takes furosemide 20 mg every other day, continue with furosemide 20 mg daily while hospitalized  On assessment there is +2 edema in right lower extremity  Last echocardiogram in 2021 shows an EF of 60% with a normal systolic function, no wall motion abnormalities, wall thickness was mildly increased and there was a mild concentric hypertrophy  Patient does not have a history of heart failure and does not appear hypervolemic  Echo ordered  AILYN stockings on right leg, left has a cast  Elevate extremities  Daily weights  Consider outpatient cardiology follow-up  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 of 12/14/2024  Per daughter request, podiatrist has been consulted  Wound care consult in place  Intermittent asthma without complication, unspecified asthma severity  O2 sats greater than 92%, on room air, she is in no acute distress  Continue with albuterol as needed  Follows up with family medicine last visit 11/19/2024  Stable      VTE Pharmacologic Prophylaxis: VTE Score: 6 High Risk (Score >/= 5) - Pharmacological DVT Prophylaxis Ordered: enoxaparin (Lovenox). Sequential Compression Devices Ordered.  Code Status: Level 1 - Full Code per patient wishes  Discussion with family: Updated  (daughter) at bedside.    Anticipated Length of Stay: Patient will be admitted on an observation basis with an anticipated length of stay of less than 2 midnights secondary to fracture of left fibula, pending PT, podiatry and wound care consult.    History of Present Illness   Chief Complaint: Reports left ankle pain after being assisted from the chair to the bathroom    Diya Garcia is a 89 y.o. female with a PMH of asthma, idiopathic gout, osteoarthritis of the toe, bifascicular block, edema of bilateral lower extremities, osteoarthritis of  the toe who presents with fracture of the left fibula.  History was provided mostly from daughter who reports Mrs. Garcia lives alone at home and has caregiver coming 24/7, noted some weakness in the last 24-48 hours, the patient is usually independent with her roller walker.  Noted the last 24 hours she has reported some weakness in bilateral lower extremities, the night of 12/12/2024 the patient slept in the recliner due to poor mobility.  The morning of 12/13/2024, caregiver came to home and found Mrs. Garcia in the recliner.  Per daughter report when tried to transition the patient from 1 spot to another 1 moderate-severe pain was felt in left lower ankle, which prompted visit to the ER.  X-ray revealed a fracture of left fibula.  The patient has been admitted under observation for further management and treatment.    Review of Systems   Constitutional:  Negative for chills and fever.   HENT:  Negative for ear pain and sore throat.    Eyes:  Negative for pain and visual disturbance.   Respiratory:  Negative for cough and shortness of breath.    Cardiovascular:  Negative for chest pain and palpitations.   Gastrointestinal:  Negative for abdominal pain and vomiting.   Genitourinary:  Negative for dysuria and hematuria.   Musculoskeletal:  Positive for arthralgias and gait problem. Negative for back pain.   Skin:  Negative for color change and rash.   Neurological:  Positive for weakness. Negative for seizures and syncope.   All other systems reviewed and are negative.      Historical Information   Past Medical History:   Diagnosis Date    Arthritis 2000    approximately    Breast cancer (HCC)     Cancer (HCC) 2010    approximately    HL (hearing loss) 2010    Hypertension     on and off    Macular degeneration     Visual impairment 2018    macular degeneration     Past Surgical History:   Procedure Laterality Date    EAR SURGERY      EYE SURGERY      cataracts    HIP SURGERY      JOINT REPLACEMENT  ?    knee & hip     KNEE SURGERY       Social History     Tobacco Use    Smoking status: Some Days     Current packs/day: 0.25     Average packs/day: 0.3 packs/day for 20.0 years (5.0 ttl pk-yrs)     Types: Cigarettes    Smokeless tobacco: Never   Vaping Use    Vaping status: Never Used   Substance and Sexual Activity    Alcohol use: Yes     Alcohol/week: 4.0 standard drinks of alcohol     Types: 4 Standard drinks or equivalent per week    Drug use: Never    Sexual activity: Not Currently     Birth control/protection: Post-menopausal     E-Cigarette/Vaping    E-Cigarette Use Never User      E-Cigarette/Vaping Substances    Nicotine No     THC No     CBD No     Flavoring No     Other No     Unknown No      History reviewed. No pertinent family history.  Social History:  Marital Status:    Occupation: Retired  Patient Pre-hospital Living Situation: Alone  Patient Pre-hospital Level of Mobility: walks with walker  Patient Pre-hospital Diet Restrictions: None    Meds/Allergies   I have reviewed home medications with patient family member.  Prior to Admission medications    Medication Sig Start Date End Date Taking? Authorizing Provider   acetaminophen (TYLENOL) 500 mg tablet Take 1 tablet (500 mg total) by mouth every 6 (six) hours as needed for mild pain 4/10/23   Etienne Linda PA-C   albuterol (2.5 mg/3 mL) 0.083 % nebulizer solution Take 3 mL (2.5 mg total) by nebulization every 6 (six) hours as needed for wheezing or shortness of breath 11/18/24   All Edouard MD   albuterol (Ventolin HFA) 90 mcg/act inhaler Inhale 2 puffs every 6 (six) hours as needed for wheezing 10/14/24   All Edouard MD   benzonatate (TESSALON) 200 MG capsule Take 1 capsule (200 mg total) by mouth 3 (three) times a day as needed for cough 11/18/24   All Edouard MD   enalapril (VASOTEC) 10 mg tablet Take 1 tablet (10 mg total) by mouth daily 1/16/23   All Edouard MD   escitalopram (LEXAPRO) 5 mg tablet Take 1 tablet (5 mg total)  by mouth daily 7/8/24   All Edouard MD   fluticasone (FLONASE) 50 mcg/act nasal spray 2 sprays into each nostril daily 4/16/24   All Edouard MD   furosemide (LASIX) 20 mg tablet Take one tab po every other day 7/10/24   All Edouard MD   meloxicam (Mobic) 15 mg tablet Take 1 tablet (15 mg total) by mouth daily 5/2/24   Adan Boswell, DO   Multiple Vitamins-Minerals (PreserVision AREDS 2+Multi Vit) CAPS Take by mouth    Historical Provider, MD   mupirocin (BACTROBAN) 2 % ointment Apply topically 2 (two) times a day for 14 days 3/19/24 10/14/24  Sakshi Mccabe MD   omeprazole (PriLOSEC) 20 mg delayed release capsule Take 1 capsule (20 mg total) by mouth daily before breakfast 7/29/24 1/25/25  All Edouard MD   potassium chloride (MICRO-K) 10 MEQ CR capsule Take 2 tabs po when you take Furosemide. 7/10/24   All Edouard MD     No Known Allergies    Objective :  Temp:  [97.8 °F (36.6 °C)-98.3 °F (36.8 °C)] 98.3 °F (36.8 °C)  HR:  [73-95] 95  BP: (147-165)/(70-76) 147/73  Resp:  [17-22] 22  SpO2:  [97 %] 97 %  O2 Device: None (Room air)    Physical Exam  Vitals and nursing note reviewed.   Constitutional:       General: She is not in acute distress.     Appearance: Normal appearance. She is well-developed.   HENT:      Head: Normocephalic and atraumatic.      Comments: Hard of hearing     Mouth/Throat:      Mouth: Mucous membranes are moist.      Pharynx: Oropharynx is clear.   Eyes:      Conjunctiva/sclera: Conjunctivae normal.      Pupils: Pupils are equal, round, and reactive to light.   Cardiovascular:      Rate and Rhythm: Normal rate and regular rhythm.      Heart sounds: No murmur heard.  Pulmonary:      Effort: Pulmonary effort is normal. No respiratory distress.      Breath sounds: Normal breath sounds.   Abdominal:      General: Bowel sounds are normal.      Palpations: Abdomen is soft.      Tenderness: There is no abdominal tenderness.   Musculoskeletal:          General: No swelling.      Cervical back: Neck supple.      Right lower le+ Edema present.   Skin:     General: Skin is warm and dry.      Capillary Refill: Capillary refill takes less than 2 seconds.      Findings: Wound present.   Neurological:      General: No focal deficit present.      Mental Status: She is alert and oriented to person, place, and time. Mental status is at baseline.   Psychiatric:         Mood and Affect: Mood normal.         Speech: Speech normal.         Behavior: Behavior normal.         Thought Content: Thought content normal.      Comments: Pleasant          Lines/Drains:            Lab Results: I have reviewed the following results:  Results from last 7 days   Lab Units 24  1422   WBC Thousand/uL 11.85*   HEMOGLOBIN g/dL 12.5   HEMATOCRIT % 39.8   PLATELETS Thousands/uL 256   SEGS PCT % 85*   LYMPHO PCT % 8*   MONO PCT % 7   EOS PCT % 0     Results from last 7 days   Lab Units 24  1558   SODIUM mmol/L 141   POTASSIUM mmol/L 4.1   CHLORIDE mmol/L 107   CO2 mmol/L 24   BUN mg/dL 26*   CREATININE mg/dL 1.13   ANION GAP mmol/L 10   CALCIUM mg/dL 9.7   GLUCOSE RANDOM mg/dL 106             Lab Results   Component Value Date    HGBA1C 5.4 2024    HGBA1C 4.8 2023           Imaging Results Review: I personally reviewed the following image studies/reports in PACS and discussed pertinent findings with Radiology: X-rays x-ray of left ankle. My interpretation of the radiology images/reports is: Fracture of fibula.  Other Study Results Review: EKG was reviewed.     Administrative Statements   I have spent a total time of 50 minutes in caring for this patient on the day of the visit/encounter including Diagnostic results, Prognosis, Risks and benefits of tx options, Instructions for management, Patient and family education, Importance of tx compliance, Risk factor reductions, Impressions, Counseling / Coordination of care, Documenting in the medical record, Reviewing /  ordering tests, medicine, procedures  , Obtaining or reviewing history  , and Communicating with other healthcare professionals .    ** Please Note: This note has been constructed using a voice recognition system. **

## 2024-12-14 NOTE — PHYSICAL THERAPY NOTE
Physical Therapy Cancellation Note    Chart review performed. At this time, PT evaluation cancelled as patient is pending formal orthopedic consult. PT will follow and provide PT interventions as appropriate.       12/14/24 0922   PT Last Visit   PT Visit Date 12/14/24   Note Type   Note type Cancelled Session;Evaluation   Cancel Reasons Medical status         Silvana Granda, PT

## 2024-12-15 PROBLEM — R79.89 ELEVATED SERUM CREATININE: Status: ACTIVE | Noted: 2024-12-15

## 2024-12-15 PROBLEM — R82.90 ABNORMAL URINALYSIS: Status: ACTIVE | Noted: 2024-12-15

## 2024-12-15 LAB
ANION GAP SERPL CALCULATED.3IONS-SCNC: 5 MMOL/L (ref 4–13)
BASOPHILS # BLD AUTO: 0.02 THOUSANDS/ÂΜL (ref 0–0.1)
BASOPHILS NFR BLD AUTO: 0 % (ref 0–1)
BUN SERPL-MCNC: 36 MG/DL (ref 5–25)
CALCIUM SERPL-MCNC: 8.5 MG/DL (ref 8.4–10.2)
CHLORIDE SERPL-SCNC: 109 MMOL/L (ref 96–108)
CO2 SERPL-SCNC: 25 MMOL/L (ref 21–32)
CREAT SERPL-MCNC: 1.42 MG/DL (ref 0.6–1.3)
EOSINOPHIL # BLD AUTO: 0.16 THOUSAND/ÂΜL (ref 0–0.61)
EOSINOPHIL NFR BLD AUTO: 2 % (ref 0–6)
ERYTHROCYTE [DISTWIDTH] IN BLOOD BY AUTOMATED COUNT: 15.6 % (ref 11.6–15.1)
GFR SERPL CREATININE-BSD FRML MDRD: 31 ML/MIN/1.73SQ M
GLUCOSE P FAST SERPL-MCNC: 98 MG/DL (ref 65–99)
GLUCOSE SERPL-MCNC: 98 MG/DL (ref 65–140)
HCT VFR BLD AUTO: 32.6 % (ref 34.8–46.1)
HGB BLD-MCNC: 10.1 G/DL (ref 11.5–15.4)
IMM GRANULOCYTES # BLD AUTO: 0.04 THOUSAND/UL (ref 0–0.2)
IMM GRANULOCYTES NFR BLD AUTO: 0 % (ref 0–2)
LYMPHOCYTES # BLD AUTO: 1.51 THOUSANDS/ÂΜL (ref 0.6–4.47)
LYMPHOCYTES NFR BLD AUTO: 16 % (ref 14–44)
MAGNESIUM SERPL-MCNC: 2.1 MG/DL (ref 1.9–2.7)
MCH RBC QN AUTO: 28.6 PG (ref 26.8–34.3)
MCHC RBC AUTO-ENTMCNC: 31 G/DL (ref 31.4–37.4)
MCV RBC AUTO: 92 FL (ref 82–98)
MONOCYTES # BLD AUTO: 0.86 THOUSAND/ÂΜL (ref 0.17–1.22)
MONOCYTES NFR BLD AUTO: 9 % (ref 4–12)
NEUTROPHILS # BLD AUTO: 7.06 THOUSANDS/ÂΜL (ref 1.85–7.62)
NEUTS SEG NFR BLD AUTO: 73 % (ref 43–75)
NRBC BLD AUTO-RTO: 0 /100 WBCS
PLATELET # BLD AUTO: 181 THOUSANDS/UL (ref 149–390)
PMV BLD AUTO: 9.3 FL (ref 8.9–12.7)
POTASSIUM SERPL-SCNC: 4.2 MMOL/L (ref 3.5–5.3)
RBC # BLD AUTO: 3.53 MILLION/UL (ref 3.81–5.12)
SODIUM SERPL-SCNC: 139 MMOL/L (ref 135–147)
WBC # BLD AUTO: 9.65 THOUSAND/UL (ref 4.31–10.16)

## 2024-12-15 PROCEDURE — 80048 BASIC METABOLIC PNL TOTAL CA: CPT | Performed by: FAMILY MEDICINE

## 2024-12-15 PROCEDURE — 97163 PT EVAL HIGH COMPLEX 45 MIN: CPT

## 2024-12-15 PROCEDURE — 83735 ASSAY OF MAGNESIUM: CPT | Performed by: FAMILY MEDICINE

## 2024-12-15 PROCEDURE — 99233 SBSQ HOSP IP/OBS HIGH 50: CPT | Performed by: FAMILY MEDICINE

## 2024-12-15 PROCEDURE — 85025 COMPLETE CBC W/AUTO DIFF WBC: CPT | Performed by: FAMILY MEDICINE

## 2024-12-15 RX ORDER — ENOXAPARIN SODIUM 100 MG/ML
30 INJECTION SUBCUTANEOUS DAILY
Status: DISCONTINUED | OUTPATIENT
Start: 2024-12-15 | End: 2024-12-19 | Stop reason: HOSPADM

## 2024-12-15 RX ORDER — GUAIFENESIN 100 MG/5ML
200 SOLUTION ORAL EVERY 4 HOURS PRN
Status: DISCONTINUED | OUTPATIENT
Start: 2024-12-15 | End: 2024-12-19 | Stop reason: HOSPADM

## 2024-12-15 RX ORDER — SODIUM CHLORIDE 9 MG/ML
50 INJECTION, SOLUTION INTRAVENOUS CONTINUOUS
Status: DISPENSED | OUTPATIENT
Start: 2024-12-15 | End: 2024-12-15

## 2024-12-15 RX ORDER — TRAMADOL HYDROCHLORIDE 50 MG/1
50 TABLET ORAL 2 TIMES DAILY PRN
Status: DISCONTINUED | OUTPATIENT
Start: 2024-12-15 | End: 2024-12-19 | Stop reason: HOSPADM

## 2024-12-15 RX ADMIN — DEXTRAN 70, GLYCERIN, HYPROMELLOSE 1 DROP: 1; 2; 3 SOLUTION/ DROPS OPHTHALMIC at 10:16

## 2024-12-15 RX ADMIN — FLUTICASONE PROPIONATE 1 SPRAY: 50 SPRAY, METERED NASAL at 10:17

## 2024-12-15 RX ADMIN — ENOXAPARIN SODIUM 30 MG: 30 INJECTION SUBCUTANEOUS at 10:16

## 2024-12-15 RX ADMIN — NYSTATIN: 100000 POWDER TOPICAL at 17:47

## 2024-12-15 RX ADMIN — ACETAMINOPHEN 975 MG: 325 TABLET, FILM COATED ORAL at 06:40

## 2024-12-15 RX ADMIN — ESCITALOPRAM OXALATE 5 MG: 10 TABLET ORAL at 10:16

## 2024-12-15 RX ADMIN — SODIUM CHLORIDE 50 ML/HR: 0.9 INJECTION, SOLUTION INTRAVENOUS at 10:15

## 2024-12-15 RX ADMIN — NYSTATIN: 100000 POWDER TOPICAL at 08:00

## 2024-12-15 RX ADMIN — TRAMADOL HYDROCHLORIDE 50 MG: 50 TABLET, COATED ORAL at 16:24

## 2024-12-15 RX ADMIN — GUAIFENESIN 200 MG: 200 SOLUTION ORAL at 10:16

## 2024-12-15 NOTE — PLAN OF CARE
Problem: PHYSICAL THERAPY ADULT  Goal: Performs mobility at highest level of function for planned discharge setting.  See evaluation for individualized goals.  Description: Treatment/Interventions: Functional transfer training, LE strengthening/ROM, Therapeutic exercise, Endurance training, Patient/family training, Bed mobility, Continued evaluation, Spoke to nursing, Family          See flowsheet documentation for full assessment, interventions and recommendations.  Note: Prognosis: Good  Problem List: Decreased strength, Decreased range of motion, Decreased endurance, Impaired balance, Decreased mobility, Orthopedic restrictions, Pain  Assessment: Pt is 89 year old female seen for PT evaluation s/p admit to [facility] on 12/13/2024 with Fracture of left fibula. PT consulted to assess pt's functional mobility and discharge needs. Order placed for PT evaluation and treatment, with activity as tolerated order. Comorbidities affecting pt's physical performance at time of assessment include acute gout involving toe of right foot, IVCD, edema of right lower leg due to peripheral venous insufficiency, intermittent asthma, ambulatory dysfunction, and abnormal urinalysis. Prior to hospitalization, pt was independent with all functional mobility with a rollator. Pt ambulates household distances and uses a w/c in the community. Pt resides alone, but has caregivers and a supportive family. Pt resides in a one level apartment, with no steps to enter. Personal factors affecting pt at time of initial evaluation include inability to ambulate household distances, inability to navigate level surfaces without external assistance, unable to perform dynamic tasks in the community, limited home support, positive fall history, difficulty performing ADLs, inability to perform IADLs, and hearing impairments. Please find objective findings from PT assessment regarding body systems outlined above with impairments and limitations  including weakness, decreased left lower extremity ROM, impaired balance, decreased endurance, pain, decreased activity tolerance, decreased functional mobility tolerance, fall risk, and orthopedic restrictions. The following objective measures were performed on initial evaluation Barthel Index: 35/100, Modified Bethel: 4 (moderate/severe disability), and AM-PAC 6-Clicks: 6/24. Pt's clinical presentation is currently unstable/unpredictable seen in pt's presentation of need for ongoing medical management/monitoring, pt is a fall risk, pt has orthopedic restrictions limiting functional mobility, and pt requires cues and assist of two for safety with functional mobility. Pt to benefit from continued PT treatment to address deficits as defined above and maximize pt's level of function and independence with mobility. From a PT standpoint, recommendation at time of discharge would be level 2, moderate resource intensity in order to facilitate return to prior level of function.    Barriers to Discharge: Inaccessible home environment, Decreased caregiver support     Rehab Resource Intensity Level, PT: II (Moderate Resource Intensity)    See flowsheet documentation for full assessment.

## 2024-12-15 NOTE — PLAN OF CARE
Problem: PAIN - ADULT  Goal: Verbalizes/displays adequate comfort level or baseline comfort level  Description: Interventions:  - Encourage patient to monitor pain and request assistance  - Assess pain using appropriate pain scale  - Administer analgesics based on type and severity of pain and evaluate response  - Implement non-pharmacological measures as appropriate and evaluate response  - Consider cultural and social influences on pain and pain management  - Notify physician/advanced practitioner if interventions unsuccessful or patient reports new pain  Outcome: Progressing     Problem: INFECTION - ADULT  Goal: Absence or prevention of progression during hospitalization  Description: INTERVENTIONS:  - Assess and monitor for signs and symptoms of infection  - Monitor lab/diagnostic results  - Monitor all insertion sites, i.e. indwelling lines, tubes, and drains  - Monitor endotracheal if appropriate and nasal secretions for changes in amount and color  - [place] appropriate cooling/warming therapies per order  - Administer medications as ordered  - Instruct and encourage patient and family to use good hand hygiene technique  - Identify and instruct in appropriate isolation precautions for identified infection/condition  Outcome: Progressing  Goal: Absence of fever/infection during neutropenic period  Description: INTERVENTIONS:  - Monitor WBC    Outcome: Progressing     Problem: SAFETY ADULT  Goal: Patient will remain free of falls  Description: INTERVENTIONS:  - Educate patient/family on patient safety including physical limitations  - Instruct patient to call for assistance with activity   - Consult OT/PT to assist with strengthening/mobility   - Keep Call bell within reach  - Keep bed low and locked with side rails adjusted as appropriate  - Keep care items and personal belongings within reach  - Initiate and maintain comfort rounds  - Make Fall Risk Sign visible to staff  - Offer Toileting every 2 Hours,  in advance of need  - Initiate/Maintain bed alarm  - Obtain necessary fall risk management equipment: bed alarm, nonskid socks  - Apply yellow socks and bracelet for high fall risk patients  - Consider moving patient to room near nurses station  Outcome: Progressing  Goal: Maintain or return to baseline ADL function  Description: INTERVENTIONS:  -  Assess patient's ability to carry out ADLs; assess patient's baseline for ADL function and identify physical deficits which impact ability to perform ADLs (bathing, care of mouth/teeth, toileting, grooming, dressing, etc.)  - Assess/evaluate cause of self-care deficits   - Assess range of motion  - Assess patient's mobility; develop plan if impaired  - Assess patient's need for assistive devices and provide as appropriate  - Encourage maximum independence but intervene and supervise when necessary  - Involve family in performance of ADLs  - Assess for home care needs following discharge   - Consider OT consult to assist with ADL evaluation and planning for discharge  - Provide patient education as appropriate  Outcome: Progressing  Goal: Maintains/Returns to pre admission functional level  Description: INTERVENTIONS:  - Perform AM-PAC 6 Click Basic Mobility/ Daily Activity assessment daily.  - Set and communicate daily mobility goal to care team and patient/family/caregiver.   - Collaborate with rehabilitation services on mobility goals if consulted  - Perform Range of Motion 4 times a day.  - Reposition patient every 2 hours.  - Dangle patient 4 times a day  - Stand patient 4 times a day  - Ambulate patient 4 times a day  - Out of bed to chair 4 times a day   - Out of bed for meals 4 times a day  - Out of bed for toileting  - Record patient progress and toleration of activity level   Outcome: Progressing     Problem: DISCHARGE PLANNING  Goal: Discharge to home or other facility with appropriate resources  Description: INTERVENTIONS:  - Identify barriers to discharge  w/patient and caregiver  - Arrange for needed discharge resources and transportation as appropriate  - Identify discharge learning needs (meds, wound care, etc.)  - Arrange for interpretive services to assist at discharge as needed  - Refer to Case Management Department for coordinating discharge planning if the patient needs post-hospital services based on physician/advanced practitioner order or complex needs related to functional status, cognitive ability, or social support system  Outcome: Progressing     Problem: Knowledge Deficit  Goal: Patient/family/caregiver demonstrates understanding of disease process, treatment plan, medications, and discharge instructions  Description: Complete learning assessment and assess knowledge base.  Interventions:  - Provide teaching at level of understanding  - Provide teaching via preferred learning methods  Outcome: Progressing     Problem: MUSCULOSKELETAL - ADULT  Goal: Maintain or return mobility to safest level of function  Description: INTERVENTIONS:  - Assess patient's ability to carry out ADLs; assess patient's baseline for ADL function and identify physical deficits which impact ability to perform ADLs (bathing, care of mouth/teeth, toileting, grooming, dressing, etc.)  - Assess/evaluate cause of self-care deficits   - Assess range of motion  - Assess patient's mobility  - Assess patient's need for assistive devices and provide as appropriate  - Encourage maximum independence but intervene and supervise when necessary  - Involve family in performance of ADLs  - Assess for home care needs following discharge   - Consider OT consult to assist with ADL evaluation and planning for discharge  - Provide patient education as appropriate  Outcome: Progressing  Goal: Maintain proper alignment of affected body part  Description: INTERVENTIONS:  - Support, maintain and protect limb and body alignment  - Provide patient/ family with appropriate education  Outcome: Progressing      Problem: Prexisting or High Potential for Compromised Skin Integrity  Goal: Skin integrity is maintained or improved  Description: INTERVENTIONS:  - Identify patients at risk for skin breakdown  - Assess and monitor skin integrity  - Assess and monitor nutrition and hydration status  - Monitor labs   - Assess for incontinence   - Turn and reposition patient  - Assist with mobility/ambulation  - Relieve pressure over bony prominences  - Avoid friction and shearing  - Provide appropriate hygiene as needed including keeping skin clean and dry  - Evaluate need for skin moisturizer/barrier cream  - Collaborate with interdisciplinary team   - Patient/family teaching  - Consider wound care consult   Outcome: Progressing     Problem: Nutrition/Hydration-ADULT  Goal: Nutrient/Hydration intake appropriate for improving, restoring or maintaining nutritional needs  Description: Monitor and assess patient's nutrition/hydration status for malnutrition. Collaborate with interdisciplinary team and initiate plan and interventions as ordered.  Monitor patient's weight and dietary intake as ordered or per policy. Utilize nutrition screening tool and intervene as necessary. Determine patient's food preferences and provide high-protein, high-caloric foods as appropriate.     INTERVENTIONS:  - Monitor oral intake, urinary output, labs, and treatment plans  - Assess nutrition and hydration status and recommend course of action  - Evaluate amount of meals eaten  - Assist patient with eating if necessary   - Allow adequate time for meals  - Recommend/ encourage appropriate diets, oral nutritional supplements, and vitamin/mineral supplements  - Order, calculate, and assess calorie counts as needed  - Recommend, monitor, and adjust tube feedings and TPN/PPN based on assessed needs  - Assess need for intravenous fluids  - Provide specific nutrition/hydration education as appropriate  - Include patient/family/caregiver in decisions related to  nutrition  Outcome: Progressing

## 2024-12-15 NOTE — PLAN OF CARE
Problem: PAIN - ADULT  Goal: Verbalizes/displays adequate comfort level or baseline comfort level  Description: Interventions:  - Encourage patient to monitor pain and request assistance  - Assess pain using appropriate pain scale  - Administer analgesics based on type and severity of pain and evaluate response  - Implement non-pharmacological measures as appropriate and evaluate response  - Consider cultural and social influences on pain and pain management  - Notify physician/advanced practitioner if interventions unsuccessful or patient reports new pain  Outcome: Progressing     Problem: INFECTION - ADULT  Goal: Absence or prevention of progression during hospitalization  Description: INTERVENTIONS:  - Assess and monitor for signs and symptoms of infection  - Monitor lab/diagnostic results  - Monitor all insertion sites, i.e. indwelling lines, tubes, and drains  - Monitor endotracheal if appropriate and nasal secretions for changes in amount and color  - [place] appropriate cooling/warming therapies per order  - Administer medications as ordered  - Instruct and encourage patient and family to use good hand hygiene technique  - Identify and instruct in appropriate isolation precautions for identified infection/condition  Outcome: Progressing  Goal: Absence of fever/infection during neutropenic period  Description: INTERVENTIONS:  - Monitor WBC    Outcome: Progressing     Problem: SAFETY ADULT  Goal: Patient will remain free of falls  Description: INTERVENTIONS:  - Educate patient/family on patient safety including physical limitations  - Instruct patient to call for assistance with activity   - Consult OT/PT to assist with strengthening/mobility   - Keep Call bell within reach  - Keep bed low and locked with side rails adjusted as appropriate  - Keep care items and personal belongings within reach  - Initiate and maintain comfort rounds  - Make Fall Risk Sign visible to staff  - Offer Toileting every  Hours,  in advance of need  - Initiate/Maintain alarm  - Obtain necessary fall risk management equipment:   - Apply yellow socks and bracelet for high fall risk patients  - Consider moving patient to room near nurses station  Outcome: Progressing  Goal: Maintain or return to baseline ADL function  Description: INTERVENTIONS:  -  Assess patient's ability to carry out ADLs; assess patient's baseline for ADL function and identify physical deficits which impact ability to perform ADLs (bathing, care of mouth/teeth, toileting, grooming, dressing, etc.)  - Assess/evaluate cause of self-care deficits   - Assess range of motion  - Assess patient's mobility; develop plan if impaired  - Assess patient's need for assistive devices and provide as appropriate  - Encourage maximum independence but intervene and supervise when necessary  - Involve family in performance of ADLs  - Assess for home care needs following discharge   - Consider OT consult to assist with ADL evaluation and planning for discharge  - Provide patient education as appropriate  Outcome: Progressing  Goal: Maintains/Returns to pre admission functional level  Description: INTERVENTIONS:  - Perform AM-PAC 6 Click Basic Mobility/ Daily Activity assessment daily.  - Set and communicate daily mobility goal to care team and patient/family/caregiver.   - Collaborate with rehabilitation services on mobility goals if consulted  - Perform Range of Motion  times a day.  - Reposition patient every  hours.  - Dangle patient  times a day  - Stand patient  times a day  - Ambulate patient  times a day  - Out of bed to chair  times a day   - Out of bed for meal times a day  - Out of bed for toileting  - Record patient progress and toleration of activity level   Outcome: Progressing     Problem: DISCHARGE PLANNING  Goal: Discharge to home or other facility with appropriate resources  Description: INTERVENTIONS:  - Identify barriers to discharge w/patient and caregiver  - Arrange for  needed discharge resources and transportation as appropriate  - Identify discharge learning needs (meds, wound care, etc.)  - Arrange for interpretive services to assist at discharge as needed  - Refer to Case Management Department for coordinating discharge planning if the patient needs post-hospital services based on physician/advanced practitioner order or complex needs related to functional status, cognitive ability, or social support system  Outcome: Progressing     Problem: Knowledge Deficit  Goal: Patient/family/caregiver demonstrates understanding of disease process, treatment plan, medications, and discharge instructions  Description: Complete learning assessment and assess knowledge base.  Interventions:  - Provide teaching at level of understanding  - Provide teaching via preferred learning methods  Outcome: Progressing     Problem: MUSCULOSKELETAL - ADULT  Goal: Maintain or return mobility to safest level of function  Description: INTERVENTIONS:  - Assess patient's ability to carry out ADLs; assess patient's baseline for ADL function and identify physical deficits which impact ability to perform ADLs (bathing, care of mouth/teeth, toileting, grooming, dressing, etc.)  - Assess/evaluate cause of self-care deficits   - Assess range of motion  - Assess patient's mobility  - Assess patient's need for assistive devices and provide as appropriate  - Encourage maximum independence but intervene and supervise when necessary  - Involve family in performance of ADLs  - Assess for home care needs following discharge   - Consider OT consult to assist with ADL evaluation and planning for discharge  - Provide patient education as appropriate  Outcome: Progressing  Goal: Maintain proper alignment of affected body part  Description: INTERVENTIONS:  - Support, maintain and protect limb and body alignment  - Provide patient/ family with appropriate education  Outcome: Progressing     Problem: Prexisting or High Potential  for Compromised Skin Integrity  Goal: Skin integrity is maintained or improved  Description: INTERVENTIONS:  - Identify patients at risk for skin breakdown  - Assess and monitor skin integrity  - Assess and monitor nutrition and hydration status  - Monitor labs   - Assess for incontinence   - Turn and reposition patient  - Assist with mobility/ambulation  - Relieve pressure over bony prominences  - Avoid friction and shearing  - Provide appropriate hygiene as needed including keeping skin clean and dry  - Evaluate need for skin moisturizer/barrier cream  - Collaborate with interdisciplinary team   - Patient/family teaching  - Consider wound care consult   Outcome: Progressing     Problem: Nutrition/Hydration-ADULT  Goal: Nutrient/Hydration intake appropriate for improving, restoring or maintaining nutritional needs  Description: Monitor and assess patient's nutrition/hydration status for malnutrition. Collaborate with interdisciplinary team and initiate plan and interventions as ordered.  Monitor patient's weight and dietary intake as ordered or per policy. Utilize nutrition screening tool and intervene as necessary. Determine patient's food preferences and provide high-protein, high-caloric foods as appropriate.     INTERVENTIONS:  - Monitor oral intake, urinary output, labs, and treatment plans  - Assess nutrition and hydration status and recommend course of action  - Evaluate amount of meals eaten  - Assist patient with eating if necessary   - Allow adequate time for meals  - Recommend/ encourage appropriate diets, oral nutritional supplements, and vitamin/mineral supplements  - Order, calculate, and assess calorie counts as needed  - Recommend, monitor, and adjust tube feedings and TPN/PPN based on assessed needs  - Assess need for intravenous fluids  - Provide specific nutrition/hydration education as appropriate  - Include patient/family/caregiver in decisions related to nutrition  Outcome: Progressing

## 2024-12-15 NOTE — PHYSICAL THERAPY NOTE
Physical Therapy Evaluation     Patient's Name: Diya Garcia    Admitting Diagnosis  Ankle injury [S99.919A]  Fibula fracture [S82.409A]  Ambulatory dysfunction [R26.2]  Weakness of both lower extremities [R29.898]    Problem List  Patient Active Problem List   Diagnosis    Acute gout involving toe of right foot    Osteoarthritis of toe    IVCD (intraventricular conduction defect)    Edema of right lower leg due to peripheral venous insufficiency    Unspecified open wound, left lower leg, sequela    Intermittent asthma without complication, unspecified asthma severity    Fracture of left fibula    Ambulatory dysfunction    Elevated serum creatinine    Abnormal urinalysis     Past Medical History  Past Medical History:   Diagnosis Date    Arthritis 2000    approximately    Breast cancer (HCC)     Cancer (HCC) 2010    approximately    HL (hearing loss) 2010    Hypertension     on and off    Macular degeneration     Visual impairment 2018    macular degeneration     Past Surgical History  Past Surgical History:   Procedure Laterality Date    EAR SURGERY      EYE SURGERY      cataracts    HIP SURGERY      JOINT REPLACEMENT  ?    knee & hip    KNEE SURGERY        12/15/24 0909   PT Last Visit   PT Visit Date 12/15/24   Note Type   Note type Evaluation   Pain Assessment   Pain Assessment Tool FLACC   Pain Score   (no numeric number provided)   Pain Location/Orientation Orientation: Left;Location: Leg   Pain Onset/Description Frequency: Intermittent   Hospital Pain Intervention(s) Repositioned;Ambulation/increased activity;Elevated   Pain Rating: FLACC (Rest) - Face 0   Pain Rating: FLACC (Rest) - Legs 0   Pain Rating: FLACC (Rest) - Activity 0   Pain Rating: FLACC (Rest) - Cry 0   Pain Rating: FLACC (Rest) - Consolability 0   Score: FLACC (Rest) 0   Pain Rating: FLACC (Activity) - Face 1   Pain Rating: FLACC (Activity) - Legs 0   Pain Rating: FLACC (Activity) - Activity 1   Pain Rating: FLACC (Activity) - Cry 1   Pain  "Rating: FLACC (Activity) - Consolability 0   Score: FLACC (Activity) 3   Restrictions/Precautions   Weight Bearing Precautions Per Order Yes   LLE Weight Bearing Per Order NWB  (per ortho)   Braces or Orthoses Other (Comment)  (L LE splinted)   Other Precautions Visual impairment;Hard of hearing;Fall Risk;Pain;Limb alert;WBS;Chair Alarm;Bed Alarm  (macular degeneration)   Home Living   Type of Home Apartment   Home Layout One level;Able to live on main level with bedroom/bathroom;Performs ADLs on one level;Elevator  (No JOSE)   Bathroom Shower/Tub Walk-in shower   Bathroom Toilet Raised   Bathroom Equipment Shower chair;Grab bars in shower;Grab bars around toilet   Bathroom Accessibility Accessible   Home Equipment Wheelchair-manual;Walker  (rollator)   Additional Comments Pt ambulates with a walker; uses a w/c in the community.   Prior Function   Level of [place] Independent with functional mobility;Needs assistance with ADLs;Needs assistance with IADLS   Lives With Alone   Receives Help From Family  (daughter; caregivers 4 hours/day)   IADLs Family/Friend/Other provides transportation;Family/Friend/Other provides meals;Family/Friend/Other provides medication management   Falls in the last 6 months 1 to 4  (1 fall)   Vocational Retired   Comments Pt's home setup and PLOF obtained from daughter at bedside; as patient is hard of hearing.   General   Family/Caregiver Present Yes  (pt's daughter)   Cognition   Arousal/Participation Alert   Orientation Level Oriented to person;Oriented to place   Memory Decreased short term memory   Following Commands Follows one step commands without difficulty   Comments Pt agreeable to PT.   Subjective   Subjective \"I can try.\"   RLE Assessment   RLE Assessment X   Strength RLE   RLE Overall Strength 3+/5   LLE Assessment   LLE Assessment X   LLE Overall AROM   L Ankle Dorsiflexion limited; splinted   Strength LLE   LLE Overall Strength 3/5  (grossly assessed)   Light Touch   RLE " Light Touch Grossly intact   LLE Light Touch Grossly intact   Bed Mobility   Rolling R 3  Moderate assistance   Additional items Assist x 2;HOB elevated;Bedrails;Increased time required;Verbal cues;LE management   Supine to Sit 3  Moderate assistance   Additional items Assist x 2;HOB elevated;Bedrails;Increased time required;Verbal cues;LE management   Sit to Supine 3  Moderate assistance   Additional items Assist x 2;HOB elevated;Bedrails;Increased time required;Verbal cues;LE management   Additional Comments Pt tolerated sitting at EOB for ~5 minutes   Transfers   Additional Comments Attempted to perform 2 sit to stand transfers with max assist of 2 and NWB on L LE; pt unable to clear buttocks from EOB.   Balance   Static Sitting Fair   Dynamic Sitting Fair -   Endurance Deficit   Endurance Deficit Yes   Endurance Deficit Description decreased activity tolerance   Activity Tolerance   Activity Tolerance Patient tolerated treatment well   Assessment   Prognosis Good   Problem List Decreased strength;Decreased range of motion;Decreased endurance;Impaired balance;Decreased mobility;Orthopedic restrictions;Pain   Assessment Pt is 89 year old female seen for PT evaluation s/p admit to [facility] on 12/13/2024 with Fracture of left fibula. PT consulted to assess pt's functional mobility and discharge needs. Order placed for PT evaluation and treatment, with activity as tolerated order. Comorbidities affecting pt's physical performance at time of assessment include acute gout involving toe of right foot, IVCD, edema of right lower leg due to peripheral venous insufficiency, intermittent asthma, ambulatory dysfunction, and abnormal urinalysis. Prior to hospitalization, pt was independent with all functional mobility with a rollator. Pt ambulates household distances and uses a w/c in the community. Pt resides alone, but has caregivers and a supportive family. Pt resides in a one level apartment, with no steps to  enter. Personal factors affecting pt at time of initial evaluation include inability to ambulate household distances, inability to navigate level surfaces without external assistance, unable to perform dynamic tasks in the community, limited home support, positive fall history, difficulty performing ADLs, inability to perform IADLs, and hearing impairments. Please find objective findings from PT assessment regarding body systems outlined above with impairments and limitations including weakness, decreased left lower extremity ROM, impaired balance, decreased endurance, pain, decreased activity tolerance, decreased functional mobility tolerance, fall risk, and orthopedic restrictions. The following objective measures were performed on initial evaluation Barthel Index: 35/100, Modified Kimberlee: 4 (moderate/severe disability), and AM-PAC 6-Clicks: 6/24. Pt's clinical presentation is currently unstable/unpredictable seen in pt's presentation of need for ongoing medical management/monitoring, pt is a fall risk, pt has orthopedic restrictions limiting functional mobility, and pt requires cues and assist of two for safety with functional mobility. Pt to benefit from continued PT treatment to address deficits as defined above and maximize pt's level of function and independence with mobility. From a PT standpoint, recommendation at time of discharge would be level 2, moderate resource intensity in order to facilitate return to prior level of function.   Barriers to Discharge Inaccessible home environment;Decreased caregiver support   Goals   STG Expiration Date 12/25/24   Short Term Goal #1 In 10 days: Increase bilateral LE strength 1/2 grade to facilitate independent mobility, Perform all bed mobility tasks with min A of 1 to decrease caregiver burden, Increase static sitting and dynamic sitting balance 1/2 grade to decrease risk for falls, and PT to see and establish goals for transfers, standing balance, and gait when  appropriate   Plan   Treatment/Interventions Functional transfer training;LE strengthening/ROM;Therapeutic exercise;Endurance training;Patient/family training;Bed mobility;Continued evaluation;Spoke to nursing;Family   PT Frequency 3-5x/wk   Discharge Recommendation   Rehab Resource Intensity Level, PT II (Moderate Resource Intensity)   AM-PAC Basic Mobility Inpatient   Turning in Flat Bed Without Bedrails 1   Lying on Back to Sitting on Edge of Flat Bed Without Bedrails 1   Moving Bed to Chair 1   Standing Up From Chair Using Arms 1   Walk in Room 1   Climb 3-5 Stairs With Railing 1   Basic Mobility Inpatient Raw Score 6   Turning Head Towards Sound 2   Follow Simple Instructions 3   Low Function Basic Mobility Raw Score  11   Low Function Basic Mobility Standardized Score  16.55   [facility] Highest Level Of Mobility   -[facility] Goal 2: Bed activities/Dependent transfer   -[facility] Achieved 3: Sit at edge of bed   Modified Kingston Scale   Modified Kingston Scale 4   Barthel Index   Feeding 5   Bathing 0   Grooming Score 0   Dressing Score 5   Bladder Score 5   Bowels Score 10   Toilet Use Score 5   Transfers (Bed/Chair) Score 5   Mobility (Level Surface) Score 0   Stairs Score 0   Barthel Index Score 35     PT Evaluation Time: 0909-0931  Wendy Panchal, PT, DPT

## 2024-12-15 NOTE — PROGRESS NOTES
"Progress Note - Hospitalist   Name: Diya Garcia 89 y.o. female I MRN: [id number]  Unit/Bed#: 2 E 266-01 I Date of Admission: 12/13/2024   Date of Service: 12/15/2024 I Hospital Day: 0    Assessment & Plan  Fracture of left fibula  Discussed with ortho: NWB and f/u OP in one week to discuss further surgical vs non surgical management  PT OT consulted  Pain adequately controlled  Family wants STR at DC  Currently in AO ankle splint, continue  Continue renally dosed DVT prophylaxis  Ambulatory dysfunction  Daughter at bedside reports the last week her mother has been having some issues with mobility, mobilize at home with a roller walking  On 12/12/2024 the patient is left all night in a recliner and was not able to stand up on her own until caregiver came to be seen in the morning  Baseline is pretty much independent with roller walker, however in the last 24-48 hours she is requiring extensive assistance to mobilize  Every 2 hours turns  Fall precautions  PT for further evaluation and treatment  Edema of right lower leg due to peripheral venous insufficiency  Appears to be chronic  Med reconciliation shows patient takes furosemide 20 mg every other day, continue with furosemide 20 mg daily while hospitalized  On assessment there is +2 edema in right lower extremity  Last echocardiogram in 2021 shows an EF of 60% with a normal systolic function, no wall motion abnormalities, wall thickness was mildly increased and there was a mild concentric hypertrophy  Patient does not have a history of heart failure and does not appear hypervolemic  Echo ordered and will be followed  AILYN stockings on right leg, left has a cast  Elevate extremities  Daily weights  Consider outpatient cardiology follow-up if echo wnl  Acute gout involving toe of right foot  Noted wound on right foot, third digit  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 Not an infection. Cont treating for " "gout\"  Start colchicine based on Crcl of 30- 0.6mg every other day  Elevated serum creatinine  Doesn't meet KDIGO MARY criteria   Likely due to low BP causing hypoperfusion  Giving gentle hydration 500cc over 10 hours  Holding lasix and losartan    Intermittent asthma without complication, unspecified asthma severity  O2 sats greater than 92%, on room air, she is in no acute distress  Continue with albuterol as needed  Follows up with family medicine last visit 11/19/2024  Stable  IVCD (intraventricular conduction defect)  EKG shows MI interval of 306- 1st degree AV block  Also has IVCD  Unspecified open wound, left lower leg, sequela  Wound care  Abnormal urinalysis  Presented with fall and resultant fracture, UTI is a possibility  Will send cultures prior to starting antibiotics  Pt denies urinary symptoms currently    VTE Pharmacologic Prophylaxis: VTE Score: 6 High Risk (Score >/= 5) - Pharmacological DVT Prophylaxis Ordered: enoxaparin (Lovenox). Sequential Compression Devices Ordered.    Mobility:   Basic Mobility Inpatient Raw Score: 6  JH-HLM Goal: 2: Bed activities/Dependent transfer  JH-HLM Achieved: 1: Laying in bed  JH-HLM Goal NOT achieved. Continue with multidisciplinary rounding and encourage appropriate mobility to improve upon JH-HLM goals.    Patient Centered Rounds: I performed bedside rounds with nursing staff today.   Discussions with Specialists or Other Care Team Provider: orthopedics, podiatry    Education and Discussions with Family / Patient: Updated  (daughter) via phone.    Current Length of Stay: 0 day(s)  Current Patient Status: Observation   Certification Statement: The patient will continue to require additional inpatient hospital stay due to elevated renal function and pending placement  Discharge Plan: Anticipate discharge in 24-48 hrs to rehab facility.    Code Status: Level 1 - Full Code    Subjective   Pt seen and examined at bedside during am rounds  No acute events " overnight  Pain well controlled at rest  Nwb currently    Objective :  Temp:  [98.2 °F (36.8 °C)-99.9 °F (37.7 °C)] 98.2 °F (36.8 °C)  HR:  [56-78] 56  BP: ()/(46-57) 88/57  Resp:  [17] 17  SpO2:  [94 %] 94 %  O2 Device: None (Room air)    Body mass index is 28.54 kg/m².     Input and Output Summary (last 24 hours):     Intake/Output Summary (Last 24 hours) at 12/15/2024 0758  Last data filed at 12/14/2024 1900  Gross per 24 hour   Intake 1200 ml   Output --   Net 1200 ml       Physical Exam  General- Awake, alert and oriented x 3, looks comfortable  HEENT- Normocephalic, atraumatic, oral mucosa- moist, hard of hearing  Neck- Supple, No carotid bruit, no JVD  CVS- Normal S1/ S2, Regular rate and rhythm, No murmur, No edema  Respiratory system- B/L clear breath sounds, no wheezing  Abdomen- Soft, Non distended, no tenderness, Bowel sound- present 4 quads  Genitourinary- No suprapubic tenderness, No CVA tenderness  Skin- No new bruise or rash  Musculoskeletal- LLE AO splint noted  Psych- No acute psychosis  CNS- CN II- XII grossly intact, No acute focal neurologic deficit noted      Lines/Drains:              Lab Results: I have reviewed the following results:   Results from last 7 days   Lab Units 12/15/24  0620   WBC Thousand/uL 9.65   HEMOGLOBIN g/dL 10.1*   HEMATOCRIT % 32.6*   PLATELETS Thousands/uL 181   SEGS PCT % 73   LYMPHO PCT % 16   MONO PCT % 9   EOS PCT % 2     Results from last 7 days   Lab Units 12/15/24  0620 12/14/24  0559   SODIUM mmol/L 139 140   POTASSIUM mmol/L 4.2 4.2   CHLORIDE mmol/L 109* 110*   CO2 mmol/L 25 25   BUN mg/dL 36* 27*   CREATININE mg/dL 1.42* 1.17   ANION GAP mmol/L 5 5   CALCIUM mg/dL 8.5 8.8   ALBUMIN g/dL  --  3.5   TOTAL BILIRUBIN mg/dL  --  0.82   ALK PHOS U/L  --  61   ALT U/L  --  9   AST U/L  --  20   GLUCOSE RANDOM mg/dL 98 101                       Recent Cultures (last 7 days):         Imaging Results Review: No pertinent imaging studies reviewed.  Other Study  Results Review: No additional pertinent studies reviewed.    Last 24 Hours Medication List:     Current Facility-Administered Medications:     acetaminophen (TYLENOL) tablet 975 mg, Q8H YISEL    albuterol inhalation solution 2.5 mg, Q6H PRN    aluminum-magnesium hydroxide-simethicone (MAALOX) oral suspension 30 mL, Q6H PRN    Artificial Tears Op Soln 1 drop, Q4H PRN    benzonatate (TESSALON PERLES) capsule 200 mg, TID PRN    colchicine (COLCRYS) tablet 0.6 mg, Every Other Day    Diclofenac Sodium (VOLTAREN) 1 % topical gel 2 g, 4x Daily    enoxaparin (LOVENOX) subcutaneous injection 30 mg, Daily    escitalopram (LEXAPRO) tablet 5 mg, Daily    fluticasone (FLONASE) 50 mcg/act nasal spray 1 spray, Daily    HYDROmorphone HCl (DILAUDID) injection 0.2 mg, Q4H PRN    melatonin tablet 3 mg, HS PRN    nystatin (MYCOSTATIN) powder, BID    ondansetron (ZOFRAN) injection 4 mg, Q6H PRN    oxyCODONE (ROXICODONE) IR tablet 5 mg, Q6H PRN **OR** oxyCODONE (ROXICODONE) split tablet 2.5 mg, Q6H PRN    polyethylene glycol (MIRALAX) packet 17 g, Daily PRN    sodium chloride 0.9 % infusion, Continuous    Administrative Statements   Today, Patient Was Seen By: Feroz Arreola MD  I have spent a total time of 50 minutes in caring for this patient on the day of the visit/encounter including Diagnostic results, Prognosis, Risks and benefits of tx options, Instructions for management, Patient and family education, Importance of tx compliance, Risk factor reductions, Impressions, Counseling / Coordination of care, Documenting in the medical record, Reviewing / ordering tests, medicine, procedures  , Obtaining or reviewing history  , and Communicating with other healthcare professionals .    **Please Note: This note may have been constructed using a voice recognition system.**

## 2024-12-16 ENCOUNTER — TELEPHONE (OUTPATIENT)
Dept: OBGYN CLINIC | Facility: HOSPITAL | Age: 89
End: 2024-12-16

## 2024-12-16 ENCOUNTER — APPOINTMENT (INPATIENT)
Dept: NON INVASIVE DIAGNOSTICS | Facility: HOSPITAL | Age: 89
DRG: 563 | End: 2024-12-16
Payer: MEDICARE

## 2024-12-16 LAB
ANION GAP SERPL CALCULATED.3IONS-SCNC: 8 MMOL/L (ref 4–13)
BSA FOR ECHO PROCEDURE: 1.86 M2
BUN SERPL-MCNC: 39 MG/DL (ref 5–25)
CALCIUM SERPL-MCNC: 8.1 MG/DL (ref 8.4–10.2)
CHLORIDE SERPL-SCNC: 109 MMOL/L (ref 96–108)
CO2 SERPL-SCNC: 22 MMOL/L (ref 21–32)
CREAT SERPL-MCNC: 1.36 MG/DL (ref 0.6–1.3)
ERYTHROCYTE [DISTWIDTH] IN BLOOD BY AUTOMATED COUNT: 15.4 % (ref 11.6–15.1)
GFR SERPL CREATININE-BSD FRML MDRD: 33 ML/MIN/1.73SQ M
GLUCOSE SERPL-MCNC: 96 MG/DL (ref 65–140)
HCT VFR BLD AUTO: 30 % (ref 34.8–46.1)
HGB BLD-MCNC: 9.3 G/DL (ref 11.5–15.4)
MAGNESIUM SERPL-MCNC: 2.2 MG/DL (ref 1.9–2.7)
MCH RBC QN AUTO: 28 PG (ref 26.8–34.3)
MCHC RBC AUTO-ENTMCNC: 31 G/DL (ref 31.4–37.4)
MCV RBC AUTO: 90 FL (ref 82–98)
PLATELET # BLD AUTO: 195 THOUSANDS/UL (ref 149–390)
PMV BLD AUTO: 9 FL (ref 8.9–12.7)
POTASSIUM SERPL-SCNC: 4.2 MMOL/L (ref 3.5–5.3)
RBC # BLD AUTO: 3.32 MILLION/UL (ref 3.81–5.12)
SL CV LV EF: 60
SODIUM SERPL-SCNC: 139 MMOL/L (ref 135–147)
WBC # BLD AUTO: 9.4 THOUSAND/UL (ref 4.31–10.16)

## 2024-12-16 PROCEDURE — 93306 TTE W/DOPPLER COMPLETE: CPT

## 2024-12-16 PROCEDURE — 87077 CULTURE AEROBIC IDENTIFY: CPT | Performed by: FAMILY MEDICINE

## 2024-12-16 PROCEDURE — 87086 URINE CULTURE/COLONY COUNT: CPT | Performed by: FAMILY MEDICINE

## 2024-12-16 PROCEDURE — 93306 TTE W/DOPPLER COMPLETE: CPT | Performed by: INTERNAL MEDICINE

## 2024-12-16 PROCEDURE — 80048 BASIC METABOLIC PNL TOTAL CA: CPT | Performed by: FAMILY MEDICINE

## 2024-12-16 PROCEDURE — 83735 ASSAY OF MAGNESIUM: CPT | Performed by: FAMILY MEDICINE

## 2024-12-16 PROCEDURE — 87186 SC STD MICRODIL/AGAR DIL: CPT | Performed by: FAMILY MEDICINE

## 2024-12-16 PROCEDURE — 85027 COMPLETE CBC AUTOMATED: CPT | Performed by: FAMILY MEDICINE

## 2024-12-16 PROCEDURE — 99232 SBSQ HOSP IP/OBS MODERATE 35: CPT | Performed by: FAMILY MEDICINE

## 2024-12-16 RX ADMIN — DICLOFENAC SODIUM 2 G: 10 GEL TOPICAL at 08:56

## 2024-12-16 RX ADMIN — ESCITALOPRAM OXALATE 5 MG: 10 TABLET ORAL at 08:48

## 2024-12-16 RX ADMIN — NYSTATIN 1 APPLICATION: 100000 POWDER TOPICAL at 08:58

## 2024-12-16 RX ADMIN — LIDOCAINE AND MENTHOL 2 APPLICATION: 40; 10 SPRAY TOPICAL at 08:46

## 2024-12-16 RX ADMIN — NYSTATIN: 100000 POWDER TOPICAL at 17:30

## 2024-12-16 RX ADMIN — ENOXAPARIN SODIUM 30 MG: 30 INJECTION SUBCUTANEOUS at 08:48

## 2024-12-16 RX ADMIN — FLUTICASONE PROPIONATE 1 SPRAY: 50 SPRAY, METERED NASAL at 08:58

## 2024-12-16 RX ADMIN — DICLOFENAC SODIUM 2 G: 10 GEL TOPICAL at 17:30

## 2024-12-16 RX ADMIN — DICLOFENAC SODIUM 2 G: 10 GEL TOPICAL at 13:50

## 2024-12-16 RX ADMIN — ACETAMINOPHEN 975 MG: 325 TABLET, FILM COATED ORAL at 21:12

## 2024-12-16 RX ADMIN — CEFTRIAXONE SODIUM 1000 MG: 10 INJECTION, POWDER, FOR SOLUTION INTRAVENOUS at 09:52

## 2024-12-16 RX ADMIN — COLCHICINE 0.6 MG: 0.6 TABLET ORAL at 08:48

## 2024-12-16 RX ADMIN — DICLOFENAC SODIUM 2 G: 10 GEL TOPICAL at 21:17

## 2024-12-16 NOTE — WOUND OSTOMY CARE
Consult Note - Wound   Diya Garcia 89 y.o. female MRN: [id number]  Unit/Bed#: 2 E 266-01 Encounter: [id number]        Assessment :   Patient admitted to [facility] due to fracture of left fibula. History of cancer, HTN, macular degeneration, hearing loss. Wound care nurse consulted for right lower extremity wound. Patient is agreeable to assessment, alert and oriented x3, incontinent of bowel and bladder, assist of 1-2 to turn for assessment, wedges in place for repositioning, heels elevated, left lower extremity in a splint and being followed by orthopedic surgery, patient is an assist with care.     1. Pressure injury mid sacrum, evolving DTI-POA- Wound is oval in shape, true depth unknown, approx. 30% non-blanchable partial thickness pink tissue and 70% non-blanchable purple tissue, with scant amount of serosanguineous drainage noted. Sepideh-wound is dry, intact, blanchable. This wound has the potential to evolve to a full thickness injury, stage 3 or 4.    2. Bilateral buttock- Skin is dry, intact, blanchable, blanchable purple discoloration to right buttock.     3. 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 Wounds are oval in shape, more lateral wound covered 100% in brown eschar and medial wound bed is mixture of pink and yellow tissue, with a thick white  discharge (most likely due to gout crystals) and small amount of serosanguineous discharge. Sepideh-wound is dry, intact, swollen, pink.     4. Bilateral elbows, hips, and right heel- Skin is dry, intact, blanchable. Left lower extremity skin unable to be assessed due to splint in place.    Educated patient on importance of frequent offloading of pressure via turning, repositioning, and weight-shifting. Verbalized understanding of plan of care.    No induration, fluctuance, odor, warmth, or purulence noted to the above noted wounds. New dressing applied. Patient tolerated well, reports moderate to severe pain to the toe wound. Primary nurse aware  of plan of care. See flow sheets for more detailed assessment findings. Will follow along.      Skin care plans:  1-Mid sacrum- Cleanse with soap and water or comfort shield barrier wipes. Apply Triad paste to open wound bed TID and PRN with godfrey-care.  2-Hydraguard/Silicone Cream to bilateral buttock and heels BID and PRN.  3-Elevate heels to offload pressure.  4-Ehob cushion when out of bed.  5-Turn/reposition q2h for pressure re-distribution on skin.  6-Moisturize skin daily with skin nourishing cream.  7-Right 2nd toe- Cleanse wound with NSS flush, pat dry and paint wound with Betadine, cover with sterile 2x2 and wrap with small Melanie. Change daily and as needed for soilage/displacement.       Wound 12/13/24 Pressure Injury Sacrum Mid (Active)   Wound Image   12/16/24 0909   Wound Description Light purple;Pink 12/16/24 0909   Pressure Injury Stage DTPI 12/16/24 0909   Godfrey-wound Assessment Dry;Intact 12/16/24 0909   Wound Length (cm) 2 cm 12/16/24 0909   Wound Width (cm) 1.2 cm 12/16/24 0909   Wound Depth (cm) 0.1 cm 12/16/24 0909   Wound Surface Area (cm^2) 2.4 cm^2 12/16/24 0909   Wound Volume (cm^3) 0.24 cm^3 12/16/24 0909   Calculated Wound Volume (cm^3) 0.24 cm^3 12/16/24 0909   Drainage Amount Scant 12/16/24 0909   Drainage Description Serosanguineous 12/16/24 0909   Non-staged Wound Description Partial thickness 12/16/24 0909   Treatments Cleansed;Site care 12/16/24 0909   Dressing Other (Comment) 12/16/24 0909       Wound 12/13/24 Toe D2, second Anterior;Right (Active)   Wound Image   12/16/24 0906   Wound Description Yellow;Pink;Brown 12/16/24 0906   Pressure Injury Stage U 12/14/24 2130   Godfrey-wound Assessment Dry;Intact;Swelling 12/16/24 0906   Wound Length (cm) 1 cm 12/16/24 0906   Wound Width (cm) 1.4 cm 12/16/24 0906   Wound Depth (cm) 0.5 cm 12/16/24 0906   Wound Surface Area (cm^2) 1.4 cm^2 12/16/24 0906   Wound Volume (cm^3) 0.7 cm^3 12/16/24 0906   Calculated Wound Volume (cm^3) 0.7 cm^3  12/16/24 0906   Drainage Amount Small 12/16/24 0906   Drainage Description Other (Comment);Serosanguineous 12/16/24 0906   Non-staged Wound Description Full thickness 12/16/24 0906   Treatments Cleansed;Irrigation with NSS;Site care 12/16/24 0906   Dressing Other (Comment);Dry dressing 12/16/24 0906   Wound packed? No 12/16/24 0906   Dressing Changed Changed 12/16/24 0906   Patient Tolerance Tolerated well 12/16/24 0906   Dressing Status Clean;Dry;Intact 12/16/24 0906     Contact through [employer] Secure Chat with any questions  Wound Care will continue to follow while inpatient    Adelia COFFMANN RN CWON  Wound and Ostomy care

## 2024-12-16 NOTE — PROGRESS NOTES
"Progress Note - Hospitalist   Name: Diya Garcia 89 y.o. female I MRN: [id number]  Unit/Bed#: 2 E 266-01 I Date of Admission: 12/13/2024   Date of Service: 12/16/2024 I Hospital Day: 1    Assessment & Plan  Fracture of left fibula  Discussed with ortho: NWB and f/u OP in one week to discuss further surgical vs non surgical management  PT OT consulted  Pain adequately controlled  Family wants STR at DC  Currently in AO ankle splint, continue  Continue renally dosed DVT prophylaxis  Ambulatory dysfunction  Daughter at bedside reports the last week her mother has been having some issues with mobility, mobilize at home with a roller walking  On 12/12/2024 the patient is left all night in a recliner and was not able to stand up on her own until caregiver came to be seen in the morning  Baseline is pretty much independent with roller walker, however in the last 24-48 hours she is requiring extensive assistance to mobilize  Every 2 hours turns  Fall precautions  PT for further evaluation and treatment  Edema of right lower leg due to peripheral venous insufficiency  Appears to be chronic  Resume lasix once creat is table  On assessment there is +2 edema in right lower extremity  Last echocardiogram in 2021 shows an EF of 60% with a normal systolic function, no wall motion abnormalities, wall thickness was mildly increased and there was a mild concentric hypertrophy  Patient does not have a history of heart failure and does not appear hypervolemic  Echo ordered and will be followed  AILYN stockings on right leg, left has a cast  Elevate extremities  Daily weights    Acute gout involving toe of right foot  Noted wound on right foot, third digit  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 Not an infection. Cont treating for gout\"  Start colchicine based on Crcl of 30- 0.6mg every other day  Elevated serum creatinine  Improved   Doesn't meet KDIGO MARY criteria   Likely due to " low BP causing hypoperfusion  Giving gentle hydration 500cc over 10 hours  Holding lasix and losartan, if continues to improve, will resume tomorrow    Intermittent asthma without complication, unspecified asthma severity  O2 sats greater than 92%, on room air, she is in no acute distress  Continue with albuterol as needed  Follows up with family medicine last visit 11/19/2024  Stable  IVCD (intraventricular conduction defect)  EKG shows NH interval of 306- 1st degree AV block  Also has IVCD  Unspecified open wound, left lower leg, sequela  Wound care  Abnormal urinalysis  Presented with fall and resultant fracture, UTI is a possibility  Will send cultures prior to starting antibiotics  Pt denies urinary symptoms currently    VTE Pharmacologic Prophylaxis: VTE Score: 6 High Risk (Score >/= 5) - Pharmacological DVT Prophylaxis Ordered: enoxaparin (Lovenox). Sequential Compression Devices Ordered.    Mobility:   Basic Mobility Inpatient Raw Score: 6  JH-HLM Goal: 2: Bed activities/Dependent transfer  JH-HLM Achieved: 1: Laying in bed  JH-HLM Goal NOT achieved. Continue with multidisciplinary rounding and encourage appropriate mobility to improve upon JH-HLM goals.    Patient Centered Rounds: I performed bedside rounds with nursing staff today.   Discussions with Specialists or Other Care Team Provider: CM    Education and Discussions with Family / Patient:  pts daughter has been kept updated.     Current Length of Stay: 1 day(s)  Current Patient Status: Inpatient   Certification Statement: The patient will continue to require additional inpatient hospital stay due to pending placement  Discharge Plan:  pending placement    Code Status: Level 1 - Full Code    Subjective   Pt seen and examined at bedside during am rounds  Pain is better controlled  No fever or cp or dysuria    Objective :  Temp:  [98.3 °F (36.8 °C)-98.7 °F (37.1 °C)] 98.5 °F (36.9 °C)  HR:  [62-82] 62  BP: (107-118)/(52-61) 112/61  Resp:  [18] 18  SpO2:   [90 %-95 %] 90 %  O2 Device: None (Room air)    Body mass index is 28.96 kg/m².     Input and Output Summary (last 24 hours):   No intake or output data in the 24 hours ending 12/16/24 1503    Physical Exam  General- Awake, alert and oriented x 3, looks comfortable  HEENT- Normocephalic, atraumatic, oral mucosa- moist, hard of hearing  Neck- Supple, No carotid bruit, no JVD  CVS- Normal S1/ S2, Regular rate and rhythm, No murmur, No edema  Respiratory system- B/L clear breath sounds, no wheezing  Abdomen- Soft, Non distended, no tenderness, Bowel sound- present 4 quads  Genitourinary- No suprapubic tenderness, No CVA tenderness  Skin- R third toe less erythematous  Musculoskeletal- LLE in splint  Psych- No acute psychosis  CNS- CN II- XII grossly intact, No acute focal neurologic deficit noted      Lines/Drains:              Lab Results: I have reviewed the following results:   Results from last 7 days   Lab Units 12/16/24  0553 12/15/24  0620   WBC Thousand/uL 9.40 9.65   HEMOGLOBIN g/dL 9.3* 10.1*   HEMATOCRIT % 30.0* 32.6*   PLATELETS Thousands/uL 195 181   SEGS PCT %  --  73   LYMPHO PCT %  --  16   MONO PCT %  --  9   EOS PCT %  --  2     Results from last 7 days   Lab Units 12/16/24  0553 12/15/24  0620 12/14/24  0559   SODIUM mmol/L 139   < > 140   POTASSIUM mmol/L 4.2   < > 4.2   CHLORIDE mmol/L 109*   < > 110*   CO2 mmol/L 22   < > 25   BUN mg/dL 39*   < > 27*   CREATININE mg/dL 1.36*   < > 1.17   ANION GAP mmol/L 8   < > 5   CALCIUM mg/dL 8.1*   < > 8.8   ALBUMIN g/dL  --   --  3.5   TOTAL BILIRUBIN mg/dL  --   --  0.82   ALK PHOS U/L  --   --  61   ALT U/L  --   --  9   AST U/L  --   --  20   GLUCOSE RANDOM mg/dL 96   < > 101    < > = values in this interval not displayed.                       Recent Cultures (last 7 days):         Imaging Results Review: No pertinent imaging studies reviewed.  Other Study Results Review: No additional pertinent studies reviewed.    Last 24 Hours Medication List:      Current Facility-Administered Medications:     acetaminophen (TYLENOL) tablet 975 mg, Q8H YISEL    albuterol inhalation solution 2.5 mg, Q6H PRN    aluminum-magnesium hydroxide-simethicone (MAALOX) oral suspension 30 mL, Q6H PRN    Artificial Tears Op Soln 1 drop, Q4H PRN    cefTRIAXone (ROCEPHIN) 1,000 mg in dextrose 5 % 50 mL IVPB, Q24H, Last Rate: 1,000 mg (12/16/24 0952)    colchicine (COLCRYS) tablet 0.6 mg, Every Other Day    Diclofenac Sodium (VOLTAREN) 1 % topical gel 2 g, 4x Daily    enoxaparin (LOVENOX) subcutaneous injection 30 mg, Daily    escitalopram (LEXAPRO) tablet 5 mg, Daily    fluticasone (FLONASE) 50 mcg/act nasal spray 1 spray, Daily    guaiFENesin (ROBITUSSIN) oral liquid 200 mg, Q4H PRN    HYDROmorphone HCl (DILAUDID) injection 0.2 mg, Q4H PRN    Lidocaine-Menthol (Spray) 4-1 % LIQD, Q4H PRN    melatonin tablet 3 mg, HS PRN    nystatin (MYCOSTATIN) powder, BID    ondansetron (ZOFRAN) injection 4 mg, Q6H PRN    polyethylene glycol (MIRALAX) packet 17 g, Daily PRN    traMADol (ULTRAM) tablet 50 mg, BID PRN    Administrative Statements   Today, Patient Was Seen By: Feroz Arreola MD  I have spent a total time of 40 minutes in caring for this patient on the day of the visit/encounter including Diagnostic results, Prognosis, Risks and benefits of tx options, Instructions for management, Patient and family education, Importance of tx compliance, Risk factor reductions, Impressions, Counseling / Coordination of care, Documenting in the medical record, Reviewing / ordering tests, medicine, procedures  , Obtaining or reviewing history  , and Communicating with other healthcare professionals .    **Please Note: This note may have been constructed using a voice recognition system.**

## 2024-12-16 NOTE — PLAN OF CARE
Problem: PAIN - ADULT  Goal: Verbalizes/displays adequate comfort level or baseline comfort level  Description: Interventions:  - Encourage patient to monitor pain and request assistance  - Assess pain using appropriate pain scale  - Administer analgesics based on type and severity of pain and evaluate response  - Implement non-pharmacological measures as appropriate and evaluate response  - Consider cultural and social influences on pain and pain management  - Notify physician/advanced practitioner if interventions unsuccessful or patient reports new pain  Outcome: Progressing     Problem: INFECTION - ADULT  Goal: Absence or prevention of progression during hospitalization  Description: INTERVENTIONS:  - Assess and monitor for signs and symptoms of infection  - Monitor lab/diagnostic results  - Monitor all insertion sites, i.e. indwelling lines, tubes, and drains  - Monitor endotracheal if appropriate and nasal secretions for changes in amount and color  - Upson appropriate cooling/warming therapies per order  - Administer medications as ordered  - Instruct and encourage patient and family to use good hand hygiene technique  - Identify and instruct in appropriate isolation precautions for identified infection/condition  Outcome: Progressing  Goal: Absence of fever/infection during neutropenic period  Description: INTERVENTIONS:  - Monitor WBC    Outcome: Progressing     Problem: SAFETY ADULT  Goal: Patient will remain free of falls  Description: INTERVENTIONS:  - Educate patient/family on patient safety including physical limitations  - Instruct patient to call for assistance with activity   - Consult OT/PT to assist with strengthening/mobility   - Keep Call bell within reach  - Keep bed low and locked with side rails adjusted as appropriate  - Keep care items and personal belongings within reach  - Initiate and maintain comfort rounds  - Make Fall Risk Sign visible to staff  - Offer Toileting every 2 Hours,  in advance of need  - Initiate/Maintain bed alarm  - Obtain necessary fall risk management equipment: bed alarm, nonskid socks  - Apply yellow socks and bracelet for high fall risk patients  - Consider moving patient to room near nurses station  Outcome: Progressing  Goal: Maintain or return to baseline ADL function  Description: INTERVENTIONS:  -  Assess patient's ability to carry out ADLs; assess patient's baseline for ADL function and identify physical deficits which impact ability to perform ADLs (bathing, care of mouth/teeth, toileting, grooming, dressing, etc.)  - Assess/evaluate cause of self-care deficits   - Assess range of motion  - Assess patient's mobility; develop plan if impaired  - Assess patient's need for assistive devices and provide as appropriate  - Encourage maximum independence but intervene and supervise when necessary  - Involve family in performance of ADLs  - Assess for home care needs following discharge   - Consider OT consult to assist with ADL evaluation and planning for discharge  - Provide patient education as appropriate  Outcome: Progressing  Goal: Maintains/Returns to pre admission functional level  Description: INTERVENTIONS:  - Perform AM-PAC 6 Click Basic Mobility/ Daily Activity assessment daily.  - Set and communicate daily mobility goal to care team and patient/family/caregiver.   - Collaborate with rehabilitation services on mobility goals if consulted  - Perform Range of Motion 4 times a day.  - Reposition patient every 2 hours.  - Dangle patient 4 times a day  - Stand patient 4 times a day  - Ambulate patient 4 times a day  - Out of bed to chair 4 times a day   - Out of bed for meals 4 times a day  - Out of bed for toileting  - Record patient progress and toleration of activity level   Outcome: Progressing     Problem: DISCHARGE PLANNING  Goal: Discharge to home or other facility with appropriate resources  Description: INTERVENTIONS:  - Identify barriers to discharge  w/patient and caregiver  - Arrange for needed discharge resources and transportation as appropriate  - Identify discharge learning needs (meds, wound care, etc.)  - Arrange for interpretive services to assist at discharge as needed  - Refer to Case Management Department for coordinating discharge planning if the patient needs post-hospital services based on physician/advanced practitioner order or complex needs related to functional status, cognitive ability, or social support system  Outcome: Progressing     Problem: Knowledge Deficit  Goal: Patient/family/caregiver demonstrates understanding of disease process, treatment plan, medications, and discharge instructions  Description: Complete learning assessment and assess knowledge base.  Interventions:  - Provide teaching at level of understanding  - Provide teaching via preferred learning methods  Outcome: Progressing     Problem: MUSCULOSKELETAL - ADULT  Goal: Maintain or return mobility to safest level of function  Description: INTERVENTIONS:  - Assess patient's ability to carry out ADLs; assess patient's baseline for ADL function and identify physical deficits which impact ability to perform ADLs (bathing, care of mouth/teeth, toileting, grooming, dressing, etc.)  - Assess/evaluate cause of self-care deficits   - Assess range of motion  - Assess patient's mobility  - Assess patient's need for assistive devices and provide as appropriate  - Encourage maximum independence but intervene and supervise when necessary  - Involve family in performance of ADLs  - Assess for home care needs following discharge   - Consider OT consult to assist with ADL evaluation and planning for discharge  - Provide patient education as appropriate  Outcome: Progressing  Goal: Maintain proper alignment of affected body part  Description: INTERVENTIONS:  - Support, maintain and protect limb and body alignment  - Provide patient/ family with appropriate education  Outcome: Progressing      Problem: Prexisting or High Potential for Compromised Skin Integrity  Goal: Skin integrity is maintained or improved  Description: INTERVENTIONS:  - Identify patients at risk for skin breakdown  - Assess and monitor skin integrity  - Assess and monitor nutrition and hydration status  - Monitor labs   - Assess for incontinence   - Turn and reposition patient  - Assist with mobility/ambulation  - Relieve pressure over bony prominences  - Avoid friction and shearing  - Provide appropriate hygiene as needed including keeping skin clean and dry  - Evaluate need for skin moisturizer/barrier cream  - Collaborate with interdisciplinary team   - Patient/family teaching  - Consider wound care consult   Outcome: Progressing     Problem: Nutrition/Hydration-ADULT  Goal: Nutrient/Hydration intake appropriate for improving, restoring or maintaining nutritional needs  Description: Monitor and assess patient's nutrition/hydration status for malnutrition. Collaborate with interdisciplinary team and initiate plan and interventions as ordered.  Monitor patient's weight and dietary intake as ordered or per policy. Utilize nutrition screening tool and intervene as necessary. Determine patient's food preferences and provide high-protein, high-caloric foods as appropriate.     INTERVENTIONS:  - Monitor oral intake, urinary output, labs, and treatment plans  - Assess nutrition and hydration status and recommend course of action  - Evaluate amount of meals eaten  - Assist patient with eating if necessary   - Allow adequate time for meals  - Recommend/ encourage appropriate diets, oral nutritional supplements, and vitamin/mineral supplements  - Order, calculate, and assess calorie counts as needed  - Recommend, monitor, and adjust tube feedings and TPN/PPN based on assessed needs  - Assess need for intravenous fluids  - Provide specific nutrition/hydration education as appropriate  - Include patient/family/caregiver in decisions related to  nutrition  Outcome: Progressing

## 2024-12-16 NOTE — CASE MANAGEMENT
"   Case Management Discharge Planning Note    Patient name Diya Garcia  Location 2 [facility] 266/2 E 266-01 MRN [id number]  : 1929 Date 2024       Current Admission Date: 2024  Current Admission Diagnosis:Fracture of left fibula   Patient Active Problem List    Diagnosis Date Noted Date Diagnosed    Elevated serum creatinine 12/15/2024     Abnormal urinalysis 12/15/2024     Fracture of left fibula 2024     Ambulatory dysfunction 2024     Intermittent asthma without complication, unspecified asthma severity 2024     Edema of right lower leg due to peripheral venous insufficiency 10/14/2024     Unspecified open wound, left lower leg, sequela 10/14/2024     IVCD (intraventricular conduction defect) 2022     Osteoarthritis of toe 2022     Acute gout involving toe of right foot 2021       LOS (days): 1  Geometric Mean LOS (GMLOS) (days): 2.8  Days to GMLOS:1.5     OBJECTIVE:  Risk of Unplanned Readmission Score: 12.23         Current admission status: Inpatient   Preferred Pharmacy:   [facility] [address] [place], [place] - [address]  [address]  [facility] [address]  Phone: [phone] Fax: [phone]    [facility]  [address] [facility] [address]  [facility] [address]  Phone: [phone] Fax: [phone]    Primary Care Provider: All Edouard MD    Primary Insurance: MEDICARE  Secondary Insurance: BLUE CROSS    DISCHARGE DETAILS:    Discharge planning discussed with:: patient and son at bedside  Freedom of Choice: Yes  Comments - Freedom of Choice: CM spoke to Shanna from [facility] and she felt that she could accept pt on discharge, but it is still being :\"viewed\" in [facility].  CM left message for Shanna to confirm before talking to family.  Pt is agreeable to [facility] when medically cleared.  IMM reviewed and signed by pt.  Copy to pt and copy to MR for scanning.  CM contacted family/caregiver?: Yes  Were Treatment Team discharge " recommendations reviewed with patient/caregiver?: Yes  Did patient/caregiver verbalize understanding of patient care needs?: Yes  Were patient/caregiver advised of the risks associated with not following Treatment Team discharge recommendations?: Yes         Requested Home Health Care         Is the patient interested in HHC at discharge?: No    DME Referral Provided  Referral made for DME?: No    Other Referral/Resources/Interventions Provided:  Interventions: Short Term Rehab  Referral Comments: Awaiting call back from Shanna to confirm that pt will be accepted to [place]    Would you like to participate in our [facility] service program?  : No - Declined    Treatment Team Recommendation: Short Term Rehab  Discharge Destination Plan:: Short Term Rehab                                IMM Given (Date):: 12/16/24  IMM Given to:: Patient

## 2024-12-16 NOTE — DISCHARGE INSTR - OTHER ORDERS
Skin care plans:  1-Mid sacrum- Cleanse with soap and water or comfort shield barrier wipes. Apply Triad paste to open wound bed TID and PRN with godfrey-care.  2-Hydraguard/Silicone Cream to bilateral buttock and heels BID and PRN.  3-Elevate heels to offload pressure.  4-Ehob cushion when out of bed.  5-Turn/reposition q2h for pressure re-distribution on skin.  6-Moisturize skin daily with skin nourishing cream.  7-Right 2nd toe- Cleanse wound with NSS flush, pat dry and paint wound with Betadine, cover with sterile 2x2 and wrap with small Melanie. Change daily and as needed for soilage/displacement.

## 2024-12-16 NOTE — TELEPHONE ENCOUNTER
Caller: Pt's daughter     Doctor: Ramon     Reason for call: called to schedule an appt. ED 12/13 for left bimalleolar ankle fracture. Currently inpatient awaiting a nursing facility placement.     Reached out to clinical staff, advised she will receive a call back tomorrow.     Call back#: [phone]

## 2024-12-16 NOTE — CASE MANAGEMENT
Case Management Assessment & Discharge Planning Note    Patient name Diya Garcia  Location 2 [facility] [address] MRN [id number]  : 1929 Date 2024       Current Admission Date: 2024  Current Admission Diagnosis:Fracture of left fibula   Patient Active Problem List    Diagnosis Date Noted Date Diagnosed    Elevated serum creatinine 12/15/2024     Abnormal urinalysis 12/15/2024     Fracture of left fibula 2024     Ambulatory dysfunction 2024     Intermittent asthma without complication, unspecified asthma severity 2024     Edema of right lower leg due to peripheral venous insufficiency 10/14/2024     Unspecified open wound, left lower leg, sequela 10/14/2024     IVCD (intraventricular conduction defect) 2022     Osteoarthritis of toe 2022     Acute gout involving toe of right foot 2021       LOS (days): 1  Geometric Mean LOS (GMLOS) (days): 2.8  Days to GMLOS:1.7     OBJECTIVE:    Risk of Unplanned Readmission Score: 12.07         Current admission status: Inpatient       Preferred Pharmacy:   [facility] [address] [place], [place] - [address]  [address]  [facility] [address]  Phone: [phone] Fax: [phone]    [employer] [facility]  [address] [facility].  [facility] [address]  Phone: [phone] Fax: [phone]    Primary Care Provider: All Edouard MD    Primary Insurance: MEDICARE  Secondary Insurance: BLUE CROSS    ASSESSMENT:  Active Health Care Proxies    There are no active Health Care Proxies on file.       Advance Directives  Does patient have a Health Care POA?: Yes  Does patient have Advance Directives?: Yes  Advance Directives: Living will, Power of  for health care  Primary Contact: daughter Meg         Readmission Root Cause  30 Day Readmission: No    Patient Information  Admitted from:: Home  Mental Status: Alert  During Assessment patient was accompanied by: Not accompanied during  assessment  Assessment information provided by:: Patient, Daughter (Pt at bedside and daughter Meg via phone)  Primary Caregiver: Family  Caregiver's Name:: Meg  Caregiver's Relationship to Patient:: Family Member  Caregiver's Telephone Number:: [phone]  Support Systems: Daughter  County of Residence: [place]  What city do you live in?: [place]  Home entry access options. Select all that apply.: No steps to enter home  Type of Current Residence: Other (Comment) (Pt lives in an over [address] [facility] with no steps)  Living Arrangements: Lives Alone  Is patient a ?: No    Activities of Daily Living Prior to Admission  Functional Status: Assistance  Completes ADLs independently?: No  Level of ADL dependence: Assistance (Pt has a private pay caregiver for 4+hrs daily)  Ambulates independently?: Yes  Does patient use assisted devices?: Yes  Assisted Devices (DME) used: Walker, Wheelchair  Does patient currently own DME?: Yes  What DME does the patient currently own?: Walker, Wheelchair  Does patient have a history of Outpatient Therapy (PT/OT)?: No  Does the patient have a history of Short-Term Rehab?: Yes  Does patient have a history of HHC?: Yes  Does patient currently have HHC?: No         Patient Information Continued  Income Source: Unemployed  Does patient have prescription coverage?: Yes  Does patient receive dialysis treatments?: No  Does patient have a history of substance abuse?: No  Does patient have a history of Mental Health Diagnosis?: No         Means of Transportation  Means of Transport to Appts:: Family transport          DISCHARGE DETAILS:    Discharge planning discussed with:: patient at bedside and daughter Meg via phone  Freedom of Choice: Yes  Comments - Freedom of Choice: Pt lives alone in an [address] [facility]-no steps.  Has a private caregiver for 4 + hrs daily.  Daughter Meg is interested in local facilities with [facility] being their first choice.  Referrals  placed and will review acceptances with pt and daughter  CM contacted family/caregiver?: Yes  Were Treatment Team discharge recommendations reviewed with patient/caregiver?: Yes  Did patient/caregiver verbalize understanding of patient care needs?: Yes  Were patient/caregiver advised of the risks associated with not following Treatment Team discharge recommendations?: Yes    Contacts  Patient Contacts: Meg  Relationship to Patient:: Family  Contact Method: Phone  Phone Number: [phone]  Reason/Outcome: Discharge Planning, Continuity of Care    Requested Home Health Care         Is the patient interested in HHC at discharge?: No    DME Referral Provided  Referral made for DME?: No    Other Referral/Resources/Interventions Provided:  Interventions: Short Term Rehab  Referral Comments: Local STR's pended per pt and daughter's request.  CM to reveiw acceptances with dgt Meg    Would you like to participate in our [facility] service program?  : No - Declined    Treatment Team Recommendation: Short Term Rehab  Discharge Destination Plan:: Short Term Rehab  Transport at Discharge : [facility] jax

## 2024-12-16 NOTE — CASE MANAGEMENT
Case Management Assessment & Discharge Planning Note    Patient name Diya Garcia  Location 2 [email or web address] [address] MRN [id number]  : 1929 Date 2024       Current Admission Date: 2024  Current Admission Diagnosis:Fracture of left fibula   Patient Active Problem List    Diagnosis Date Noted Date Diagnosed    Elevated serum creatinine 12/15/2024     Abnormal urinalysis 12/15/2024     Fracture of left fibula 2024     Ambulatory dysfunction 2024     Intermittent asthma without complication, unspecified asthma severity 2024     Edema of right lower leg due to peripheral venous insufficiency 10/14/2024     Unspecified open wound, left lower leg, sequela 10/14/2024     IVCD (intraventricular conduction defect) 2022     Osteoarthritis of toe 2022     Acute gout involving toe of right foot 2021       LOS (days): 1  Geometric Mean LOS (GMLOS) (days): 2.8  Days to GMLOS:1.7     OBJECTIVE:    Risk of Unplanned Readmission Score: 12.07         Current admission status: Inpatient       Preferred Pharmacy:   [facility] [address] [place], [place] - [address]  [address]  [facility] [address]  Phone: [phone] Fax: [phone]    [employer] [facility]  [address] [facility].  [facility] [address]  Phone: [phone] Fax: [phone]    Primary Care Provider: All Edouard MD    Primary Insurance: MEDICARE  Secondary Insurance: BLUE CROSS    ASSESSMENT:  Active Health Care Proxies    There are no active Health Care Proxies on file.       Advance Directives  Does patient have a Health Care POA?: Yes  Does patient have Advance Directives?: Yes  Advance Directives: Living will, Power of  for health care  Primary Contact: daughter Meg         Readmission Root Cause  30 Day Readmission: No    Patient Information  Admitted from:: Home  Mental Status: Alert  During Assessment patient was accompanied by: Not accompanied during  assessment  Assessment information provided by:: Patient, Daughter (Pt at bedside and daughter Meg via phone)  Primary Caregiver: Family  Caregiver's Name:: Meg  Caregiver's Relationship to Patient:: Family Member  Caregiver's Telephone Number:: [phone]  Support Systems: Daughter  County of Residence: [place]  What city do you live in?: [place]  Home entry access options. Select all that apply.: No steps to enter home  Type of Current Residence: Other (Comment) (Pt lives in an over [address] [facility] with no steps)  Living Arrangements: Lives Alone  Is patient a ?: No    Activities of Daily Living Prior to Admission  Functional Status: Assistance  Completes ADLs independently?: No  Level of ADL dependence: Assistance (Pt has a private pay caregiver for 4+hrs daily)  Ambulates independently?: Yes  Does patient use assisted devices?: Yes  Assisted Devices (DME) used: Walker, Wheelchair  Does patient currently own DME?: Yes  What DME does the patient currently own?: Walker, Wheelchair  Does patient have a history of Outpatient Therapy (PT/OT)?: No  Does the patient have a history of Short-Term Rehab?: Yes  Does patient have a history of HHC?: Yes  Does patient currently have HHC?: No         Patient Information Continued  Income Source: Unemployed  Does patient have prescription coverage?: Yes  Does patient receive dialysis treatments?: No  Does patient have a history of substance abuse?: No  Does patient have a history of Mental Health Diagnosis?: No         Means of Transportation  Means of Transport to Appts:: Family transport          DISCHARGE DETAILS:    Discharge planning discussed with:: patient at bedside and daughter Meg via phone  Freedom of Choice: Yes  Comments - Freedom of Choice: Pt lives alone in an [address] [facility]-no steps.  Has a private caregiver for 4 + hrs daily.  Daughter Meg is interested in local facilities with [facility] being their first choice.  Referrals  placed and will review acceptances with pt and daughter  CM contacted family/caregiver?: Yes  Were Treatment Team discharge recommendations reviewed with patient/caregiver?: Yes  Did patient/caregiver verbalize understanding of patient care needs?: Yes  Were patient/caregiver advised of the risks associated with not following Treatment Team discharge recommendations?: Yes    Contacts  Patient Contacts: Meg  Relationship to Patient:: Family  Contact Method: Phone  Phone Number: [phone]  Reason/Outcome: Discharge Planning, Continuity of Care    Requested Home Health Care         Is the patient interested in HHC at discharge?: No    DME Referral Provided  Referral made for DME?: No    Other Referral/Resources/Interventions Provided:  Interventions: Short Term Rehab  Referral Comments: Local STR's pended per pt and daughter's request.  CM to reveiw acceptances with dgt Meg    Would you like to participate in our [facility] service program?  : No - Declined    Treatment Team Recommendation: Short Term Rehab  Discharge Destination Plan:: Short Term Rehab  Transport at Discharge : [facility] jax

## 2024-12-17 PROBLEM — S82.832G CLOSED FRACTURE OF DISTAL END OF LEFT FIBULA WITH DELAYED HEALING: Status: ACTIVE | Noted: 2024-12-13

## 2024-12-17 LAB
ANION GAP SERPL CALCULATED.3IONS-SCNC: 7 MMOL/L (ref 4–13)
BUN SERPL-MCNC: 35 MG/DL (ref 5–25)
CALCIUM SERPL-MCNC: 8.6 MG/DL (ref 8.4–10.2)
CHLORIDE SERPL-SCNC: 111 MMOL/L (ref 96–108)
CO2 SERPL-SCNC: 23 MMOL/L (ref 21–32)
CREAT SERPL-MCNC: 1.23 MG/DL (ref 0.6–1.3)
ERYTHROCYTE [DISTWIDTH] IN BLOOD BY AUTOMATED COUNT: 15.2 % (ref 11.6–15.1)
GFR SERPL CREATININE-BSD FRML MDRD: 37 ML/MIN/1.73SQ M
GLUCOSE SERPL-MCNC: 91 MG/DL (ref 65–140)
HCT VFR BLD AUTO: 31.1 % (ref 34.8–46.1)
HGB BLD-MCNC: 9.8 G/DL (ref 11.5–15.4)
MCH RBC QN AUTO: 28.6 PG (ref 26.8–34.3)
MCHC RBC AUTO-ENTMCNC: 31.5 G/DL (ref 31.4–37.4)
MCV RBC AUTO: 91 FL (ref 82–98)
PLATELET # BLD AUTO: 223 THOUSANDS/UL (ref 149–390)
PMV BLD AUTO: 9 FL (ref 8.9–12.7)
POTASSIUM SERPL-SCNC: 4.1 MMOL/L (ref 3.5–5.3)
RBC # BLD AUTO: 3.43 MILLION/UL (ref 3.81–5.12)
SODIUM SERPL-SCNC: 141 MMOL/L (ref 135–147)
WBC # BLD AUTO: 6.82 THOUSAND/UL (ref 4.31–10.16)

## 2024-12-17 PROCEDURE — 85027 COMPLETE CBC AUTOMATED: CPT | Performed by: FAMILY MEDICINE

## 2024-12-17 PROCEDURE — 99232 SBSQ HOSP IP/OBS MODERATE 35: CPT | Performed by: INTERNAL MEDICINE

## 2024-12-17 PROCEDURE — 80048 BASIC METABOLIC PNL TOTAL CA: CPT | Performed by: FAMILY MEDICINE

## 2024-12-17 RX ADMIN — ACETAMINOPHEN 975 MG: 325 TABLET, FILM COATED ORAL at 06:17

## 2024-12-17 RX ADMIN — NYSTATIN: 100000 POWDER TOPICAL at 10:04

## 2024-12-17 RX ADMIN — DEXTRAN 70, GLYCERIN, HYPROMELLOSE 1 DROP: 1; 2; 3 SOLUTION/ DROPS OPHTHALMIC at 11:26

## 2024-12-17 RX ADMIN — FLUTICASONE PROPIONATE 1 SPRAY: 50 SPRAY, METERED NASAL at 10:04

## 2024-12-17 RX ADMIN — CEFTRIAXONE SODIUM 1000 MG: 10 INJECTION, POWDER, FOR SOLUTION INTRAVENOUS at 10:18

## 2024-12-17 RX ADMIN — ACETAMINOPHEN 975 MG: 325 TABLET, FILM COATED ORAL at 22:09

## 2024-12-17 RX ADMIN — ACETAMINOPHEN 975 MG: 325 TABLET, FILM COATED ORAL at 14:42

## 2024-12-17 RX ADMIN — NYSTATIN: 100000 POWDER TOPICAL at 17:48

## 2024-12-17 RX ADMIN — DICLOFENAC SODIUM 2 G: 10 GEL TOPICAL at 22:09

## 2024-12-17 RX ADMIN — DICLOFENAC SODIUM 2 G: 10 GEL TOPICAL at 17:48

## 2024-12-17 RX ADMIN — DICLOFENAC SODIUM 2 G: 10 GEL TOPICAL at 10:04

## 2024-12-17 RX ADMIN — ENOXAPARIN SODIUM 30 MG: 30 INJECTION SUBCUTANEOUS at 10:03

## 2024-12-17 RX ADMIN — ESCITALOPRAM OXALATE 5 MG: 10 TABLET ORAL at 10:03

## 2024-12-17 NOTE — CASE MANAGEMENT
Case Management Discharge Planning Note    Patient name Diya Garcia  Location 2 [facility] [address]01 MRN [id number]  : 1929 Date 2024       Current Admission Date: 2024  Current Admission Diagnosis:Fracture of left fibula   Patient Active Problem List    Diagnosis Date Noted Date Diagnosed    Elevated serum creatinine 12/15/2024     Abnormal urinalysis 12/15/2024     Fracture of left fibula 2024     Ambulatory dysfunction 2024     Intermittent asthma without complication, unspecified asthma severity 2024     Edema of right lower leg due to peripheral venous insufficiency 10/14/2024     Unspecified open wound, left lower leg, sequela 10/14/2024     IVCD (intraventricular conduction defect) 2022     Osteoarthritis of toe 2022     Acute gout involving toe of right foot 2021       LOS (days): 2  Geometric Mean LOS (GMLOS) (days): 2.8  Days to GMLOS:0.5     OBJECTIVE:  Risk of Unplanned Readmission Score: 8.97      Current admission status: Inpatient   Preferred Pharmacy:   [facility] [address] [place], [place] - [address]  [address] [facility] [address]  Phone: [phone] Fax: [phone]    [employer] [facility]  [address] [facility]  [facility] [address]  Phone: [phone] Fax: [phone]    Primary Care Provider: All Edouard MD    Primary Insurance: MEDICARE  Secondary Insurance: BLUE CROSS    DISCHARGE DETAILS:    Discharge planning discussed with:: Patient at bedside & dtr via phone.  Freedom of Choice: Yes  Comments - Freedom of Choice: FOC maintained - CM reviewed PATITOP.  NIC's Patient Choice list provided to patient at bedside and emailed to dtr at '[email or web address]'.  [place] [facility] accepted with bed availability Thursday.  Per SLIM, cultures/sensitivities are still pending.  Patient anticipated to be ready Thursday.  CM contacted family/caregiver?: Yes  Were Treatment Team discharge recommendations reviewed with  patient/caregiver?: Yes  Did patient/caregiver verbalize understanding of patient care needs?: N/A- going to facility  Were patient/caregiver advised of the risks associated with not following Treatment Team discharge recommendations?: Yes    Contacts  Patient Contacts: Meg mendoza)  Relationship to Patient:: Family  Contact Method: Phone  Phone Number: [phone]  Reason/Outcome: Continuity of Care, Discharge Planning    Other Referral/Resources/Interventions Provided:  Interventions: Short Term Rehab  Referral Comments: [facility] reserved for STR.  Facility contacts updated & PASRR attached.  (3MN met 12/18)    Accepting Facility Name, City & State : [facility] - [address] [place] [address]  Receiving Facility/Agency Phone Number: Phone: [phone]  Facility/Agency Fax Number: Fax: [phone]

## 2024-12-17 NOTE — TELEPHONE ENCOUNTER
Caller: Patient    Doctor: Ghislaine    Reason for call: Patients daughter called would like patient to be seen while in the hospital, will be discharged 11/19. Also has questions regarding plan for next steps. Please advise.    Call back#: [phone]

## 2024-12-17 NOTE — PROGRESS NOTES
"Progress Note - Hospitalist   Name: Diya Garcia 89 y.o. female I MRN: [id number]  Unit/Bed#: 2 E 266-01 I Date of Admission: 12/13/2024   Date of Service: 12/17/2024 I Hospital Day: 2    Assessment & Plan  Fracture of left fibula  Discussed with ortho: NWB and f/u OP in one week to discuss further surgical vs non surgical management  PT OT consulted  Pain adequately controlled  Family wants STR at DC  Currently in AO ankle splint, continue  Continue renally dosed DVT prophylaxis  Ambulatory dysfunction  Daughter at bedside reports the last week her mother has been having some issues with mobility, mobilize at home with a roller walking  On 12/12/2024 the patient is left all night in a recliner and was not able to stand up on her own until caregiver came to be seen in the morning  Baseline is pretty much independent with roller walker, however in the last 24-48 hours she is requiring extensive assistance to mobilize  Every 2 hours turns  Fall precautions  PT for further evaluation and treatment  Edema of right lower leg due to peripheral venous insufficiency  Appears to be chronic  Resume lasix once creat is table  On assessment there is +2 edema in right lower extremity  Last echocardiogram in 2021 shows an EF of 60% with a normal systolic function, no wall motion abnormalities, wall thickness was mildly increased and there was a mild concentric hypertrophy  Patient does not have a history of heart failure and does not appear hypervolemic  Echo ordered and will be followed  AILYN stockings on right leg, left has a cast  Elevate extremities  Daily weights    Acute gout involving toe of right foot  Noted wound on right foot, third digit  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 Not an infection. Cont treating for gout\"  Start colchicine based on Crcl of 30- 0.6mg every other day  Elevated serum creatinine  Improved   Doesn't meet KDIGO MARY criteria   Likely due to " low BP causing hypoperfusion  Giving gentle hydration 500cc over 10 hours  Holding lasix and losartan, if continues to improve, will resume tomorrow    Intermittent asthma without complication, unspecified asthma severity  O2 sats greater than 92%, on room air, she is in no acute distress  Continue with albuterol as needed  Follows up with family medicine last visit 11/19/2024  Stable  IVCD (intraventricular conduction defect)  EKG shows DE interval of 306- 1st degree AV block  Also has IVCD  Unspecified open wound, left lower leg, sequela  Wound care  Abnormal urinalysis  Presented with fall and resultant fracture, UTI is a possibility  Will send cultures prior to starting antibiotics  Pt denies urinary symptoms currently    VTE Pharmacologic Prophylaxis: VTE Score: 6 High Risk (Score >/= 5) - Pharmacological DVT Prophylaxis Ordered: enoxaparin (Lovenox). Sequential Compression Devices Ordered.    Mobility:   Basic Mobility Inpatient Raw Score: 6  JH-HLM Goal: 2: Bed activities/Dependent transfer  JH-HLM Achieved: 2: Bed activities/Dependent transfer  JH-HLM Goal achieved. Continue to encourage appropriate mobility.    Patient Centered Rounds: I performed bedside rounds with nursing staff today.   Discussions with Specialists or Other Care Team Provider: none    Education and Discussions with Family / Patient:  patient.     Current Length of Stay: 2 day(s)  Current Patient Status: Inpatient   Certification Statement: The patient will continue to require additional inpatient hospital stay due to plan above for placement  Discharge Plan: Anticipate discharge in 24-48 hrs to rehab facility.    Code Status: Level 1 - Full Code    Subjective   Offers no new complaints at this time. Denies chest pain, SOB. All questions answered.    Objective :  Temp:  [98.3 °F (36.8 °C)-98.6 °F (37 °C)] 98.3 °F (36.8 °C)  HR:  [69-86] 69  BP: (106-113)/(55-60) 106/55  SpO2:  [90 %-94 %] 94 %  O2 Device: None (Room air)    Body mass  index is 29.06 kg/m².     Input and Output Summary (last 24 hours):     Intake/Output Summary (Last 24 hours) at 12/17/2024 1352  Last data filed at 12/17/2024 0438  Gross per 24 hour   Intake --   Output 300 ml   Net -300 ml       Physical Exam  Vitals and nursing note reviewed.   Constitutional:       General: She is not in acute distress.     Appearance: She is ill-appearing. She is not toxic-appearing.   HENT:      Head: Normocephalic.      Nose: Nose normal.      Mouth/Throat:      Mouth: Mucous membranes are dry.   Eyes:      General: No scleral icterus.     Conjunctiva/sclera: Conjunctivae normal.   Cardiovascular:      Rate and Rhythm: Normal rate.      Pulses: Normal pulses.           Radial pulses are 2+ on the right side and 2+ on the left side.      Heart sounds: Normal heart sounds.   Pulmonary:      Effort: Pulmonary effort is normal.      Breath sounds: Normal breath sounds.   Abdominal:      General: Bowel sounds are normal. There is no distension.      Palpations: Abdomen is soft.      Tenderness: There is no abdominal tenderness.   Musculoskeletal:         General: No tenderness.   Skin:     General: Skin is dry.      Coloration: Skin is not jaundiced.   Neurological:      Mental Status: She is alert.   Psychiatric:         Mood and Affect: Mood normal.         Behavior: Behavior normal. Behavior is cooperative.         Lines/Drains:              Lab Results: I have reviewed the following results:   Results from last 7 days   Lab Units 12/17/24  0617 12/16/24  0553 12/15/24  0620   WBC Thousand/uL 6.82   < > 9.65   HEMOGLOBIN g/dL 9.8*   < > 10.1*   HEMATOCRIT % 31.1*   < > 32.6*   PLATELETS Thousands/uL 223   < > 181   SEGS PCT %  --   --  73   LYMPHO PCT %  --   --  16   MONO PCT %  --   --  9   EOS PCT %  --   --  2    < > = values in this interval not displayed.     Results from last 7 days   Lab Units 12/17/24  0617 12/15/24  0620 12/14/24  0559   SODIUM mmol/L 141   < > 140   POTASSIUM mmol/L  4.1   < > 4.2   CHLORIDE mmol/L 111*   < > 110*   CO2 mmol/L 23   < > 25   BUN mg/dL 35*   < > 27*   CREATININE mg/dL 1.23   < > 1.17   ANION GAP mmol/L 7   < > 5   CALCIUM mg/dL 8.6   < > 8.8   ALBUMIN g/dL  --   --  3.5   TOTAL BILIRUBIN mg/dL  --   --  0.82   ALK PHOS U/L  --   --  61   ALT U/L  --   --  9   AST U/L  --   --  20   GLUCOSE RANDOM mg/dL 91   < > 101    < > = values in this interval not displayed.                       Recent Cultures (last 7 days):   Results from last 7 days   Lab Units 12/16/24  0541   URINE CULTURE  >100,000 cfu/ml Escherichia coli*  10,000-19,000 cfu/ml       Imaging Results Review: I reviewed radiology reports from this admission including: xray(s).  Other Study Results Review: No additional pertinent studies reviewed.    Last 24 Hours Medication List:     Current Facility-Administered Medications:     acetaminophen (TYLENOL) tablet 975 mg, Q8H YISEL    albuterol inhalation solution 2.5 mg, Q6H PRN    aluminum-magnesium hydroxide-simethicone (MAALOX) oral suspension 30 mL, Q6H PRN    Artificial Tears Op Soln 1 drop, Q4H PRN    cefTRIAXone (ROCEPHIN) 1,000 mg in dextrose 5 % 50 mL IVPB, Q24H, Last Rate: 1,000 mg (12/17/24 1018)    colchicine (COLCRYS) tablet 0.6 mg, Every Other Day    Diclofenac Sodium (VOLTAREN) 1 % topical gel 2 g, 4x Daily    enoxaparin (LOVENOX) subcutaneous injection 30 mg, Daily    escitalopram (LEXAPRO) tablet 5 mg, Daily    fluticasone (FLONASE) 50 mcg/act nasal spray 1 spray, Daily    guaiFENesin (ROBITUSSIN) oral liquid 200 mg, Q4H PRN    HYDROmorphone HCl (DILAUDID) injection 0.2 mg, Q4H PRN    Lidocaine-Menthol (Spray) 4-1 % LIQD, Q4H PRN    melatonin tablet 3 mg, HS PRN    nystatin (MYCOSTATIN) powder, BID    ondansetron (ZOFRAN) injection 4 mg, Q6H PRN    polyethylene glycol (MIRALAX) packet 17 g, Daily PRN    traMADol (ULTRAM) tablet 50 mg, BID PRN    Administrative Statements   Today, Patient Was Seen By: Sam Arellano DO      **Please Note:  This note may have been constructed using a voice recognition system.**

## 2024-12-18 LAB
BACTERIA UR CULT: ABNORMAL
BACTERIA UR CULT: ABNORMAL

## 2024-12-18 PROCEDURE — 97530 THERAPEUTIC ACTIVITIES: CPT

## 2024-12-18 PROCEDURE — 99232 SBSQ HOSP IP/OBS MODERATE 35: CPT | Performed by: INTERNAL MEDICINE

## 2024-12-18 RX ADMIN — DICLOFENAC SODIUM 2 G: 10 GEL TOPICAL at 14:07

## 2024-12-18 RX ADMIN — NYSTATIN: 100000 POWDER TOPICAL at 17:35

## 2024-12-18 RX ADMIN — DEXTRAN 70, GLYCERIN, HYPROMELLOSE 1 DROP: 1; 2; 3 SOLUTION/ DROPS OPHTHALMIC at 09:30

## 2024-12-18 RX ADMIN — NYSTATIN: 100000 POWDER TOPICAL at 09:45

## 2024-12-18 RX ADMIN — ACETAMINOPHEN 975 MG: 325 TABLET, FILM COATED ORAL at 21:11

## 2024-12-18 RX ADMIN — ESCITALOPRAM OXALATE 5 MG: 10 TABLET ORAL at 09:36

## 2024-12-18 RX ADMIN — DEXTRAN 70, GLYCERIN, HYPROMELLOSE 1 DROP: 1; 2; 3 SOLUTION/ DROPS OPHTHALMIC at 19:21

## 2024-12-18 RX ADMIN — ACETAMINOPHEN 975 MG: 325 TABLET, FILM COATED ORAL at 13:57

## 2024-12-18 RX ADMIN — DICLOFENAC SODIUM 2 G: 10 GEL TOPICAL at 21:11

## 2024-12-18 RX ADMIN — ENOXAPARIN SODIUM 30 MG: 30 INJECTION SUBCUTANEOUS at 09:35

## 2024-12-18 RX ADMIN — COLCHICINE 0.6 MG: 0.6 TABLET ORAL at 09:37

## 2024-12-18 RX ADMIN — DICLOFENAC SODIUM 2 G: 10 GEL TOPICAL at 17:32

## 2024-12-18 RX ADMIN — DICLOFENAC SODIUM 2 G: 10 GEL TOPICAL at 09:31

## 2024-12-18 RX ADMIN — CEFTRIAXONE SODIUM 1000 MG: 10 INJECTION, POWDER, FOR SOLUTION INTRAVENOUS at 09:33

## 2024-12-18 RX ADMIN — ACETAMINOPHEN 975 MG: 325 TABLET, FILM COATED ORAL at 05:48

## 2024-12-18 RX ADMIN — FLUTICASONE PROPIONATE 1 SPRAY: 50 SPRAY, METERED NASAL at 09:33

## 2024-12-18 NOTE — PLAN OF CARE
Problem: PHYSICAL THERAPY ADULT  Goal: Performs mobility at highest level of function for planned discharge setting.  See evaluation for individualized goals.  Description: Treatment/Interventions: Functional transfer training, LE strengthening/ROM, Therapeutic exercise, Endurance training, Patient/family training, Bed mobility, Continued evaluation, Spoke to nursing, Family          See flowsheet documentation for full assessment, interventions and recommendations.  Note: Prognosis: Good  Problem List: Decreased strength, Decreased range of motion, Decreased endurance, Impaired balance, Decreased mobility  Assessment: Pt seen for PT treatment session this date, consisting of ther act focused on bed mobility . Since previous session, pt has made minimal progress in terms of assist required for mobility and overall activity tolerance. Pertinent barriers during this session include  fatigue . PT assisted pt in rolling R/L multiple/several times throughout session to assist in linen/pad change and attempts to palce bed pan as pt needing to have bowel movement. LPN in to assist; pt unable to tolerate placement of bedpan (attempts with smaller pink and larger orange pan). MaxAx2 to reposition to HOB. Pt stated urge to have BM passesed; and did feel very fatigued after activity HR/SPo2 monitored throughout and stable. Discussed with LPN, pt, and pt family that at this time, transfer to bedside commode not recommended at this time due to safety concerns. Current goals and POC remain appropriate, pt continues to have rehab potential and is making fair progress towards STGs. Pt prognosis for achieving goals is fair, pending pt progress with hospitalization/medical status improvements, and indicated by Stimulability, orientation, and supportive family/caregivers. Pt limited d/t  fatigue . PT recommends level 2, moderate resource intensity upon discharge. Pt continues to be functioning below baseline level, and remains  limited 2* factors listed above. PT will continue to see pt during current hospitalization in order to address the deficits listed above and provide interventions consistent w/ POC in effort to achieve STGs.  Barriers to Discharge: Decreased caregiver support, Inaccessible home environment     Rehab Resource Intensity Level, PT: II (Moderate Resource Intensity)    See flowsheet documentation for full assessment.      Silvana Granda; PT, DPT

## 2024-12-18 NOTE — PROGRESS NOTES
"Progress Note - Hospitalist   Name: Diya Garcia 89 y.o. female I MRN: [id number]  Unit/Bed#: 2 E 266-01 I Date of Admission: 12/13/2024   Date of Service: 12/18/2024 I Hospital Day: 3    Assessment & Plan  Closed fracture of distal end of left fibula with delayed healing  Discussed with ortho: NWB and f/u OP in one week to discuss further surgical vs non surgical management  PT OT consulted  Pain adequately controlled  Family wants STR at DC; pending placement to rehab  Currently in AO ankle splint, continue  Continue renally dosed DVT prophylaxis  Ambulatory dysfunction  Daughter at bedside reports the last week her mother has been having some issues with mobility, mobilize at home with a roller walking  On 12/12/2024 the patient is left all night in a recliner and was not able to stand up on her own until caregiver came to be seen in the morning  Baseline is pretty much independent with roller walker, however in the last 24-48 hours she is requiring extensive assistance to mobilize  Every 2 hours turns  Fall precautions  PT for further evaluation and treatment  Edema of right lower leg due to peripheral venous insufficiency  Appears to be chronic  Resume lasix once creat is table  On assessment there is +2 edema in right lower extremity  Last echocardiogram in 2021 shows an EF of 60% with a normal systolic function, no wall motion abnormalities, wall thickness was mildly increased and there was a mild concentric hypertrophy  Patient does not have a history of heart failure and does not appear hypervolemic  Echo ordered and will be followed  AILYN stockings on right leg, left has a cast  Elevate extremities  Daily weights    Acute gout involving toe of right foot  Noted wound on right foot, third digit  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 Not an infection. Cont treating for gout\"  Start colchicine based on Crcl of 30- 0.6mg every other day  Elevated serum " creatinine  Doesn't meet KDIGO MARY criteria   Likely due to low BP causing hypoperfusion  Giving gentle hydration 500cc over 10 hours  Holding lasix and losartan, if continues to improve, will resume tomorrow    Intermittent asthma without complication, unspecified asthma severity  O2 sats greater than 92%, on room air, she is in no acute distress  Continue with albuterol as needed  Follows up with family medicine last visit 11/19/2024  Stable  IVCD (intraventricular conduction defect)  EKG shows MN interval of 306- 1st degree AV block  Also has IVCD  Unspecified open wound, left lower leg, sequela  Wound care  Abnormal urinalysis  Presented with fall and resultant fracture, UTI is a possibility  Will send cultures prior to starting antibiotics  Pt denies urinary symptoms currently  Rocephin Day 3    VTE Pharmacologic Prophylaxis: VTE Score: 6 High Risk (Score >/= 5) - Pharmacological DVT Prophylaxis Ordered: enoxaparin (Lovenox). Sequential Compression Devices Ordered.    Mobility:   Basic Mobility Inpatient Raw Score: 6  JH-HLM Goal: 2: Bed activities/Dependent transfer  JH-HLM Achieved: 2: Bed activities/Dependent transfer  JH-HLM Goal NOT achieved. Continue with multidisciplinary rounding and encourage appropriate mobility to improve upon JH-HLM goals.    Patient Centered Rounds: I performed bedside rounds with nursing staff today.   Discussions with Specialists or Other Care Team Provider: none; discuss w CM about placement    Education and Discussions with Family / Patient: Updated  (grandson) at bedside.    Current Length of Stay: 3 day(s)  Current Patient Status: Inpatient   Certification Statement: The patient will continue to require additional inpatient hospital stay due to plan as noted above  Discharge Plan: Anticipate discharge in 24-48 hrs to rehab facility.    Code Status: Level 1 - Full Code    Subjective   Patient at baseline - pleasantly confused; no overnight events noted. Has had  bowel movement that is regular. Family at bedside; all questions answered.    Objective :  Temp:  [98.3 °F (36.8 °C)-98.7 °F (37.1 °C)] 98.7 °F (37.1 °C)  HR:  [71-79] 74  BP: ()/(61-81) 137/74  Resp:  [18] 18  SpO2:  [92 %-95 %] 94 %  O2 Device: None (Room air)    Body mass index is 28.84 kg/m².     Input and Output Summary (last 24 hours):     Intake/Output Summary (Last 24 hours) at 12/18/2024 1602  Last data filed at 12/18/2024 1300  Gross per 24 hour   Intake 640 ml   Output --   Net 640 ml       Physical Exam  Vitals and nursing note reviewed.   Constitutional:       General: She is not in acute distress.     Appearance: She is ill-appearing. She is not toxic-appearing.   HENT:      Head: Normocephalic.      Nose: Nose normal.      Mouth/Throat:      Mouth: Mucous membranes are dry.   Eyes:      General: No scleral icterus.     Conjunctiva/sclera: Conjunctivae normal.   Cardiovascular:      Rate and Rhythm: Normal rate.      Pulses: Normal pulses.           Radial pulses are 2+ on the right side and 2+ on the left side.      Heart sounds: Normal heart sounds.   Pulmonary:      Effort: Pulmonary effort is normal.      Breath sounds: Normal breath sounds.   Abdominal:      General: Bowel sounds are normal. There is no distension.      Palpations: Abdomen is soft.      Tenderness: There is no abdominal tenderness.   Musculoskeletal:         General: No tenderness.   Skin:     General: Skin is dry.      Coloration: Skin is not jaundiced.   Neurological:      Mental Status: She is alert. Mental status is at baseline. She is disoriented.   Psychiatric:         Mood and Affect: Mood normal.         Behavior: Behavior normal. Behavior is cooperative.       Lines/Drains:              Lab Results: I have reviewed the following results:   Results from last 7 days   Lab Units 12/17/24  0617 12/16/24  0553 12/15/24  0620   WBC Thousand/uL 6.82   < > 9.65   HEMOGLOBIN g/dL 9.8*   < > 10.1*   HEMATOCRIT % 31.1*   < >  32.6*   PLATELETS Thousands/uL 223   < > 181   SEGS PCT %  --   --  73   LYMPHO PCT %  --   --  16   MONO PCT %  --   --  9   EOS PCT %  --   --  2    < > = values in this interval not displayed.     Results from last 7 days   Lab Units 12/17/24  0617 12/15/24  0620 12/14/24  0559   SODIUM mmol/L 141   < > 140   POTASSIUM mmol/L 4.1   < > 4.2   CHLORIDE mmol/L 111*   < > 110*   CO2 mmol/L 23   < > 25   BUN mg/dL 35*   < > 27*   CREATININE mg/dL 1.23   < > 1.17   ANION GAP mmol/L 7   < > 5   CALCIUM mg/dL 8.6   < > 8.8   ALBUMIN g/dL  --   --  3.5   TOTAL BILIRUBIN mg/dL  --   --  0.82   ALK PHOS U/L  --   --  61   ALT U/L  --   --  9   AST U/L  --   --  20   GLUCOSE RANDOM mg/dL 91   < > 101    < > = values in this interval not displayed.                       Recent Cultures (last 7 days):   Results from last 7 days   Lab Units 12/16/24  0541   URINE CULTURE  >100,000 cfu/ml Escherichia coli*  10,000-19,000 cfu/ml       Imaging Results Review: No pertinent imaging studies reviewed.  Other Study Results Review: No additional pertinent studies reviewed.    Last 24 Hours Medication List:     Current Facility-Administered Medications:     acetaminophen (TYLENOL) tablet 975 mg, Q8H YISEL    albuterol inhalation solution 2.5 mg, Q6H PRN    aluminum-magnesium hydroxide-simethicone (MAALOX) oral suspension 30 mL, Q6H PRN    Artificial Tears Op Soln 1 drop, Q4H PRN    cefTRIAXone (ROCEPHIN) 1,000 mg in dextrose 5 % 50 mL IVPB, Q24H, Last Rate: 1,000 mg (12/18/24 0933)    colchicine (COLCRYS) tablet 0.6 mg, Every Other Day    Diclofenac Sodium (VOLTAREN) 1 % topical gel 2 g, 4x Daily    enoxaparin (LOVENOX) subcutaneous injection 30 mg, Daily    escitalopram (LEXAPRO) tablet 5 mg, Daily    fluticasone (FLONASE) 50 mcg/act nasal spray 1 spray, Daily    guaiFENesin (ROBITUSSIN) oral liquid 200 mg, Q4H PRN    HYDROmorphone HCl (DILAUDID) injection 0.2 mg, Q4H PRN    Lidocaine-Menthol (Spray) 4-1 % LIQD, Q4H PRN    melatonin  tablet 3 mg, HS PRN    nystatin (MYCOSTATIN) powder, BID    ondansetron (ZOFRAN) injection 4 mg, Q6H PRN    polyethylene glycol (MIRALAX) packet 17 g, Daily PRN    traMADol (ULTRAM) tablet 50 mg, BID PRN    Administrative Statements   Today, Patient Was Seen By: Sam Arellano DO      **Please Note: This note may have been constructed using a voice recognition system.**

## 2024-12-18 NOTE — PLAN OF CARE
Problem: PAIN - ADULT  Goal: Verbalizes/displays adequate comfort level or baseline comfort level  Description: Interventions:  - Encourage patient to monitor pain and request assistance  - Assess pain using appropriate pain scale  - Administer analgesics based on type and severity of pain and evaluate response  - Implement non-pharmacological measures as appropriate and evaluate response  - Consider cultural and social influences on pain and pain management  - Notify physician/advanced practitioner if interventions unsuccessful or patient reports new pain  Outcome: Progressing     Problem: INFECTION - ADULT  Goal: Absence or prevention of progression during hospitalization  Description: INTERVENTIONS:  - Assess and monitor for signs and symptoms of infection  - Monitor lab/diagnostic results  - Monitor all insertion sites, i.e. indwelling lines, tubes, and drains  - Monitor endotracheal if appropriate and nasal secretions for changes in amount and color  - [place] appropriate cooling/warming therapies per order  - Administer medications as ordered  - Instruct and encourage patient and family to use good hand hygiene technique  - Identify and instruct in appropriate isolation precautions for identified infection/condition  Outcome: Progressing  Goal: Absence of fever/infection during neutropenic period  Description: INTERVENTIONS:  - Monitor WBC    Outcome: Progressing     Problem: SAFETY ADULT  Goal: Patient will remain free of falls  Description: INTERVENTIONS:  - Educate patient/family on patient safety including physical limitations  - Instruct patient to call for assistance with activity   - Consult OT/PT to assist with strengthening/mobility   - Keep Call bell within reach  - Keep bed low and locked with side rails adjusted as appropriate  - Keep care items and personal belongings within reach  - Initiate and maintain comfort rounds  - Apply yellow socks and bracelet for high fall risk patients  - Consider  moving patient to room near nurses station  Outcome: Progressing  Goal: Maintain or return to baseline ADL function  Description: INTERVENTIONS:  -  Assess patient's ability to carry out ADLs; assess patient's baseline for ADL function and identify physical deficits which impact ability to perform ADLs (bathing, care of mouth/teeth, toileting, grooming, dressing, etc.)  - Assess/evaluate cause of self-care deficits   - Assess range of motion  - Assess patient's mobility; develop plan if impaired  - Assess patient's need for assistive devices and provide as appropriate  - Encourage maximum independence but intervene and supervise when necessary  - Involve family in performance of ADLs  - Assess for home care needs following discharge   - Consider OT consult to assist with ADL evaluation and planning for discharge  - Provide patient education as appropriate  Outcome: Progressing  Goal: Maintains/Returns to pre admission functional level    - Record patient progress and toleration of activity level   Outcome: Progressing     Problem: DISCHARGE PLANNING  Goal: Discharge to home or other facility with appropriate resources  Description: INTERVENTIONS:  - Identify barriers to discharge w/patient and caregiver  - Arrange for needed discharge resources and transportation as appropriate  - Identify discharge learning needs (meds, wound care, etc.)  - Arrange for interpretive services to assist at discharge as needed  - Refer to Case Management Department for coordinating discharge planning if the patient needs post-hospital services based on physician/advanced practitioner order or complex needs related to functional status, cognitive ability, or social support system  Outcome: Progressing     Problem: Knowledge Deficit  Goal: Patient/family/caregiver demonstrates understanding of disease process, treatment plan, medications, and discharge instructions  Description: Complete learning assessment and assess knowledge  base.  Interventions:  - Provide teaching at level of understanding  - Provide teaching via preferred learning methods  Outcome: Progressing     Problem: MUSCULOSKELETAL - ADULT  Goal: Maintain or return mobility to safest level of function  Description: INTERVENTIONS:  - Assess patient's ability to carry out ADLs; assess patient's baseline for ADL function and identify physical deficits which impact ability to perform ADLs (bathing, care of mouth/teeth, toileting, grooming, dressing, etc.)  - Assess/evaluate cause of self-care deficits   - Assess range of motion  - Assess patient's mobility  - Assess patient's need for assistive devices and provide as appropriate  - Encourage maximum independence but intervene and supervise when necessary  - Involve family in performance of ADLs  - Assess for home care needs following discharge   - Consider OT consult to assist with ADL evaluation and planning for discharge  - Provide patient education as appropriate  Outcome: Progressing  Goal: Maintain proper alignment of affected body part  Description: INTERVENTIONS:  - Support, maintain and protect limb and body alignment  - Provide patient/ family with appropriate education  Outcome: Progressing     Problem: Prexisting or High Potential for Compromised Skin Integrity  Goal: Skin integrity is maintained or improved  Description: INTERVENTIONS:  - Identify patients at risk for skin breakdown  - Assess and monitor skin integrity  - Assess and monitor nutrition and hydration status  - Monitor labs   - Assess for incontinence   - Turn and reposition patient  - Assist with mobility/ambulation  - Relieve pressure over bony prominences  - Avoid friction and shearing  - Provide appropriate hygiene as needed including keeping skin clean and dry  - Evaluate need for skin moisturizer/barrier cream  - Collaborate with interdisciplinary team   - Patient/family teaching  - Consider wound care consult   Outcome: Progressing     Problem:  Nutrition/Hydration-ADULT  Goal: Nutrient/Hydration intake appropriate for improving, restoring or maintaining nutritional needs  Description: Monitor and assess patient's nutrition/hydration status for malnutrition. Collaborate with interdisciplinary team and initiate plan and interventions as ordered.  Monitor patient's weight and dietary intake as ordered or per policy. Utilize nutrition screening tool and intervene as necessary. Determine patient's food preferences and provide high-protein, high-caloric foods as appropriate.     INTERVENTIONS:  - Monitor oral intake, urinary output, labs, and treatment plans  - Assess nutrition and hydration status and recommend course of action  - Evaluate amount of meals eaten  - Assist patient with eating if necessary   - Allow adequate time for meals  - Recommend/ encourage appropriate diets, oral nutritional supplements, and vitamin/mineral supplements  - Order, calculate, and assess calorie counts as needed  - Recommend, monitor, and adjust tube feedings and TPN/PPN based on assessed needs  - Assess need for intravenous fluids  - Provide specific nutrition/hydration education as appropriate  - Include patient/family/caregiver in decisions related to nutrition  Outcome: Progressing

## 2024-12-18 NOTE — TELEPHONE ENCOUNTER
Caller: Patients daughter Meg     Doctor: [place]     Reason for call: Meg stated her mother is still in the hospital and they can not get her to the office for a visit. She has been laying on her back for 5 days and Meg is getting very frustrated. Patient is heading to Rehab and Meg is asking if it is at possible for the doctor to see her in the hospital before she leaves for Rehab or do a virtual visit. She would like to have a plan in place before she goes to Rehab.     Call back#: [phone]

## 2024-12-18 NOTE — PHYSICAL THERAPY NOTE
PHYSICAL THERAPY TREATMENT  NAME:  Diya Garcia  DATE: 12/18/24    AGE:   89 y.o.  Mrn:   [id number]  ADMIT DX:  Ankle injury [S99.919A]  Fibula fracture [S82.409A]  Ambulatory dysfunction [R26.2]  Weakness of both lower extremities [R29.898]  Problem List:   Patient Active Problem List   Diagnosis    Acute gout involving toe of right foot    Osteoarthritis of toe    IVCD (intraventricular conduction defect)    Edema of right lower leg due to peripheral venous insufficiency    Unspecified open wound, left lower leg, sequela    Intermittent asthma without complication, unspecified asthma severity    Closed fracture of distal end of left fibula with delayed healing    Ambulatory dysfunction    Elevated serum creatinine    Abnormal urinalysis       Past Medical History  Past Medical History:   Diagnosis Date    Arthritis 2000    approximately    Breast cancer (HCC)     Cancer (HCC) 2010    approximately    HL (hearing loss) 2010    Hypertension     on and off    Macular degeneration     Visual impairment 2018    macular degeneration       Past Surgical History  Past Surgical History:   Procedure Laterality Date    EAR SURGERY      EYE SURGERY      cataracts    HIP SURGERY      JOINT REPLACEMENT  ?    knee & hip    KNEE SURGERY         Length Of Stay: 3  Performed at least 2 patient identifiers during session: Name and Birthday       12/18/24 8489   PT Last Visit   PT Visit Date 12/18/24   Note Type   Note Type Treatment   Pain Assessment   Pain Assessment Tool 0-10   Pain Score No Pain  (pt stated 0/10 pain to L ankle at rest/with mobility)   Restrictions/Precautions   Weight Bearing Precautions Per Order Yes   LLE Weight Bearing Per Order NWB  (per ortho)   Braces or Orthoses   (LLE splinted)   Other Precautions Visual impairment;Hard of hearing;Fall Risk;Pain;Limb alert;WBS;Chair Alarm;Bed Alarm  (LLE NWB. macular degeneration. Pt needs increased volume due to [place])   General   Chart Reviewed Yes   Additional  "Pertinent History Left ankle bimalleolar fracture; LLE NWB per ortho   Response to Previous Treatment Patient with no complaints from previous session.   Family/Caregiver Present Yes  (grandlawrence Keane)   Cognition   Arousal/Participation Alert;Responsive;Cooperative   Attention Attends with cues to redirect   Following Commands Follows one step commands without difficulty   Comments pt agreeable to PT session   Subjective   Subjective \"I need to use the bedpan\"   Bed Mobility   Rolling R 3  Moderate assistance   Additional items Assist x 2;Increased time required;Verbal cues;LE management;HOB elevated;Bedrails   Rolling L 3  Moderate assistance   Additional items Assist x 2;Increased time required;Verbal cues;LE management;HOB elevated;Bedrails   Additional Comments Rolling R/L multiple/several times throughout session to assist in linen/pad change and attempts to palce bed pan as pt needing to have bowel movement. LPN in to assist; pt unable to tolerate placement of bedpan (attempts with smaller pink and larger orange pan). MaxAx2 to reposition to HOB. Pt stated urge to have BM passesed; and did feel very fatigued after activity HR/SPo2 monitored throughout and stable. Discussed with LPN, pt, and pt family that at this time, transfer to bedside commode not recommended at this time due to safety concerns.   Endurance Deficit   Endurance Deficit Yes   Endurance Deficit Description fatigue, decreased activity tolerance   Activity Tolerance   Activity Tolerance Patient limited by fatigue   Assessment   Prognosis Good   Problem List Decreased strength;Decreased range of motion;Decreased endurance;Impaired balance;Decreased mobility   Assessment Pt seen for PT treatment session this date, consisting of ther act focused on bed mobility . Since previous session, pt has made minimal progress in terms of assist required for mobility and overall activity tolerance. Pertinent barriers during this session include  fatigue . PT " assisted pt in rolling R/L multiple/several times throughout session to assist in linen/pad change and attempts to palce bed pan as pt needing to have bowel movement. LPN in to assist; pt unable to tolerate placement of bedpan (attempts with smaller pink and larger orange pan). MaxAx2 to reposition to HOB. Pt stated urge to have BM passesed; and did feel very fatigued after activity HR/SPo2 monitored throughout and stable. Discussed with LPN, pt, and pt family that at this time, transfer to bedside commode not recommended at this time due to safety concerns. Current goals and POC remain appropriate, pt continues to have rehab potential and is making fair progress towards STGs. Pt prognosis for achieving goals is fair, pending pt progress with hospitalization/medical status improvements, and indicated by Stimulability, orientation, and supportive family/caregivers. Pt limited d/t  fatigue . PT recommends level 2, moderate resource intensity upon discharge. Pt continues to be functioning below baseline level, and remains limited 2* factors listed above. PT will continue to see pt during current hospitalization in order to address the deficits listed above and provide interventions consistent w/ POC in effort to achieve STGs.   Barriers to Discharge Decreased caregiver support;Inaccessible home environment   Goals   STG Expiration Date 12/25/24   PT Treatment Day 1   Plan   Treatment/Interventions Therapeutic exercise;Endurance training;Patient/family training;Bed mobility;Spoke to nursing;Continued evaluation   Progress Progressing toward goals   PT Frequency 3-5x/wk   Discharge Recommendation   Rehab Resource Intensity Level, PT II (Moderate Resource Intensity)   AM-PAC Basic Mobility Inpatient   Turning in Flat Bed Without Bedrails 1   Lying on Back to Sitting on Edge of Flat Bed Without Bedrails 1   Moving Bed to Chair 1   Standing Up From Chair Using Arms 1   Walk in Room 1   Climb 3-5 Stairs With Railing 1    Basic Mobility Inpatient Raw Score 6   Turning Head Towards Sound 3   Follow Simple Instructions 3   Low Function Basic Mobility Raw Score  12   Low Function Basic Mobility Standardized Score  18.33   [facility] Level Of Mobility   -HL Goal 2: Bed activities/Dependent transfer   -HL Achieved 2: Bed activities/Dependent transfer   End of Consult   Patient Position at End of Consult All needs within reach;Bed/Chair alarm activated;Supine  (family at bedside)         Time In: 1519  Time Out: 1548  Total Treatment Minutes: 29    Silvana Granda, PT

## 2024-12-19 VITALS
TEMPERATURE: 98.9 F | OXYGEN SATURATION: 96 % | SYSTOLIC BLOOD PRESSURE: 155 MMHG | HEIGHT: 65 IN | BODY MASS INDEX: 29.46 KG/M2 | DIASTOLIC BLOOD PRESSURE: 66 MMHG | WEIGHT: 176.81 LBS | HEART RATE: 60 BPM | RESPIRATION RATE: 19 BRPM

## 2024-12-19 LAB
ANION GAP SERPL CALCULATED.3IONS-SCNC: 6 MMOL/L (ref 4–13)
BUN SERPL-MCNC: 34 MG/DL (ref 5–25)
CALCIUM SERPL-MCNC: 8.6 MG/DL (ref 8.4–10.2)
CHLORIDE SERPL-SCNC: 110 MMOL/L (ref 96–108)
CO2 SERPL-SCNC: 25 MMOL/L (ref 21–32)
CREAT SERPL-MCNC: 1.01 MG/DL (ref 0.6–1.3)
ERYTHROCYTE [DISTWIDTH] IN BLOOD BY AUTOMATED COUNT: 14.6 % (ref 11.6–15.1)
GFR SERPL CREATININE-BSD FRML MDRD: 47 ML/MIN/1.73SQ M
GLUCOSE SERPL-MCNC: 96 MG/DL (ref 65–140)
HCT VFR BLD AUTO: 29.4 % (ref 34.8–46.1)
HGB BLD-MCNC: 9.2 G/DL (ref 11.5–15.4)
MAGNESIUM SERPL-MCNC: 2 MG/DL (ref 1.9–2.7)
MCH RBC QN AUTO: 28.4 PG (ref 26.8–34.3)
MCHC RBC AUTO-ENTMCNC: 31.3 G/DL (ref 31.4–37.4)
MCV RBC AUTO: 91 FL (ref 82–98)
PLATELET # BLD AUTO: 244 THOUSANDS/UL (ref 149–390)
PMV BLD AUTO: 9.1 FL (ref 8.9–12.7)
POTASSIUM SERPL-SCNC: 4.2 MMOL/L (ref 3.5–5.3)
RBC # BLD AUTO: 3.24 MILLION/UL (ref 3.81–5.12)
SODIUM SERPL-SCNC: 141 MMOL/L (ref 135–147)
WBC # BLD AUTO: 7.12 THOUSAND/UL (ref 4.31–10.16)

## 2024-12-19 PROCEDURE — 83735 ASSAY OF MAGNESIUM: CPT | Performed by: INTERNAL MEDICINE

## 2024-12-19 PROCEDURE — 80048 BASIC METABOLIC PNL TOTAL CA: CPT | Performed by: INTERNAL MEDICINE

## 2024-12-19 PROCEDURE — 99239 HOSP IP/OBS DSCHRG MGMT >30: CPT | Performed by: INTERNAL MEDICINE

## 2024-12-19 PROCEDURE — 85027 COMPLETE CBC AUTOMATED: CPT | Performed by: INTERNAL MEDICINE

## 2024-12-19 RX ORDER — ENOXAPARIN SODIUM 100 MG/ML
30 INJECTION SUBCUTANEOUS DAILY
Start: 2024-12-20

## 2024-12-19 RX ADMIN — DEXTRAN 70, GLYCERIN, HYPROMELLOSE 1 DROP: 1; 2; 3 SOLUTION/ DROPS OPHTHALMIC at 08:51

## 2024-12-19 RX ADMIN — CEFTRIAXONE SODIUM 1000 MG: 10 INJECTION, POWDER, FOR SOLUTION INTRAVENOUS at 08:35

## 2024-12-19 RX ADMIN — FLUTICASONE PROPIONATE 1 SPRAY: 50 SPRAY, METERED NASAL at 08:45

## 2024-12-19 RX ADMIN — NYSTATIN: 100000 POWDER TOPICAL at 08:45

## 2024-12-19 RX ADMIN — DEXTRAN 70, GLYCERIN, HYPROMELLOSE 1 DROP: 1; 2; 3 SOLUTION/ DROPS OPHTHALMIC at 02:02

## 2024-12-19 RX ADMIN — ACETAMINOPHEN 975 MG: 325 TABLET, FILM COATED ORAL at 05:06

## 2024-12-19 RX ADMIN — ENOXAPARIN SODIUM 30 MG: 30 INJECTION SUBCUTANEOUS at 08:45

## 2024-12-19 RX ADMIN — DICLOFENAC SODIUM 2 G: 10 GEL TOPICAL at 08:45

## 2024-12-19 RX ADMIN — ESCITALOPRAM OXALATE 5 MG: 10 TABLET ORAL at 08:45

## 2024-12-19 NOTE — CASE MANAGEMENT
Case Management Discharge Planning Note    Patient name Diya Garcia  Location [facility] 266/2 E 266-01 MRN [id number]  : 1929 Date 2024       Current Admission Date: 2024  Current Admission Diagnosis:Closed fracture of distal end of left fibula with delayed healing   Patient Active Problem List    Diagnosis Date Noted Date Diagnosed    Elevated serum creatinine 12/15/2024     Abnormal urinalysis 12/15/2024     Closed fracture of distal end of left fibula with delayed healing 2024     Ambulatory dysfunction 2024     Intermittent asthma without complication, unspecified asthma severity 2024     Edema of right lower leg due to peripheral venous insufficiency 10/14/2024     Unspecified open wound, left lower leg, sequela 10/14/2024     IVCD (intraventricular conduction defect) 2022     Osteoarthritis of toe 2022     Acute gout involving toe of right foot 2021       LOS (days): 4  Geometric Mean LOS (GMLOS) (days): 2.8  Days to GMLOS:-1.3     OBJECTIVE:  Risk of Unplanned Readmission Score: 9.23      Current admission status: Inpatient   Preferred Pharmacy:   [facility] [address] [place], [place] - [address]  [address] [facility] [address]  Phone: [phone] Fax: [phone]    [facility]  [address] [facility].  [facility] [address]  Phone: [phone] Fax: [phone]    Primary Care Provider: All Edouard MD    Primary Insurance: MEDICARE  Secondary Insurance: BLUE CROSS    DISCHARGE DETAILS:    Discharge planning discussed with:: Dtr via email.  Freedom of Choice: Yes  Comments - Freedom of Choice: FOC maintained - CM continues to maintain contact with dtrMeg, via email.  Dtr aware of and in agreement w/ d/c to STR at [place] today.  ([email or web address])  Aware of 1100 p/u.  CM contacted family/caregiver?: Yes  Were Treatment Team discharge recommendations reviewed with patient/caregiver?: Yes  Did  patient/caregiver verbalize understanding of patient care needs?: N/A- going to facility  Were patient/caregiver advised of the risks associated with not following Treatment Team discharge recommendations?: Yes    Contacts  Patient Contacts: Meg (dtr)  Relationship to Patient:: Family  Contact Method: Other (Comment) (email)  Reason/Outcome: Continuity of Care, Discharge Planning    Other Referral/Resources/Interventions Provided:  Interventions: Short Term Rehab, Transportation  Referral Comments: [place] reserved, contacts updated, PASRR & MAR attached.  Transport coordinated via [employer] - s/v due to non-healed fractures & ortho restrictions.    Treatment Team Recommendation: Short Term Rehab  Discharge Destination Plan:: Short Term Rehab  Transport at Discharge : Stretcher van     Number/Name of Dispatcher: [employer] [phone]  Transported by (Company and Unit #): [facility] [phone]  ETA of Transport (Date): 12/19/24  ETA of Transport (Time): 1100    IMM Given (Date):: 12/19/24  IMM Given to:: Family (Verbal review.  Original to medical records.)    Accepting Facility Name, City & State : [facility] - [address] [place] [address]  Receiving Facility/Agency Phone Number: Phone: [phone]  Facility/Agency Fax Number: Fax: [phone]

## 2024-12-19 NOTE — DISCHARGE SUMMARY
[facility]   Discharge - Name: Diya Garcia I  MRN: [id number]  Unit/Bed#: 2 E 266-01 I Date of Admission: 12/13/2024   Date of Service: 12/19/2024 I Hospital Day: 4    Principal Problem:    Closed fracture of distal end of left fibula with delayed healing  Active Problems:    Acute gout involving toe of right foot    IVCD (intraventricular conduction defect)    Edema of right lower leg due to peripheral venous insufficiency    Unspecified open wound, left lower leg, sequela    Intermittent asthma without complication, unspecified asthma severity    Ambulatory dysfunction    Elevated serum creatinine    Abnormal urinalysis      Discharging Physician / Practitioner: Sam Arellano DO  PCP: All Edouard MD  Admission Date:   Admission Orders (From admission, onward)       Ordered        12/15/24 0815  INPATIENT ADMISSION  Once            12/13/24 1710  Place in Observation  Once                          Discharge Date: 12/19/24    Disposition:    [place]    Discharge Diagnoses:   Please see assessment and plan section above for further details regarding discharge diagnoses.     Consultations During Hospital Stay:  IP CONSULT TO PODIATRY  IP CONSULT TO ORTHOPEDIC SURGERY    Procedures Performed:   None     Significant Findings / Test Results:   XR knee 4+ views Right injury  Result Date: 12/13/2024  Impression: No acute osseous abnormality. Intact arthroplasty components. Computerized Assisted Algorithm (CAA) may have been used to analyze all applicable images. Workstation performed: [id number]     XR ankle 3+ views LEFT  Result Date: 12/13/2024  Impression: Bimalleolar fracture as above, with probable early healing. Minimal lateral tibiotalar subluxation. Chronic findings, as above. Computerized Assisted Algorithm (CAA) may have been used to analyze all applicable images. Workstation performed: [id number]       No Chest XR results available for this patient.       Incidental  "Findings:   None other than noted above. I reviewed the above mentioned incidental findings with the patient and/or family and they expressed understanding    Test Results Pending at Discharge (will require follow up):   None      Outpatient Tests Requested:  None     Complications:  none     Reason for Admission:   Chief Complaint   Patient presents with    Ankle Injury     Pt c/o left ankle pain after being assisted to the floor in the bathroom, -LOC, -BT       Hospital Course:      Diya Garcia is a 89 y.o. female patient who originally presented to the hospital due to Ankle Injury (Pt c/o left ankle pain after being assisted to the floor in the bathroom, -LOC, -BT)    Review of chart showed patient  has a past medical history of Arthritis (2000), Breast cancer (HCC), Cancer (HCC) (2010), HL (hearing loss) (2010), Hypertension, Macular degeneration, and Visual impairment (2018).    Patient admitted with bimalleolar fracture. Orthopedics was consulted and felt no surgical intervention was indicated. Renal function was mildly elevated but improved with holding RAAS blockers and gentle fluids. Recommended to follow-up as outpatient within 7 days with Dr. Navarro.  On gentle fluids for mild elevation in renal functions. There was also concern for gout in the right third toe for which patient was placed on renally dosed colchicine.    Condition at Discharge: stable    Discharge Day Visit / Exam:   Subjective: Offers no new complaints at this time. No acute events reported overnight. All questions answered. Eager for discharge.    Vitals: Blood Pressure: 155/66 (12/19/24 0715)  Pulse: 60 (12/19/24 0715)  Temperature: 98.9 °F (37.2 °C) (12/19/24 0715)  Temp Source: Oral (12/18/24 2210)  Respirations: 19 (12/18/24 2210)  Height: 5' 5\" (165.1 cm) (12/16/24 1128)  Weight - Scale: 80.2 kg (176 lb 12.9 oz) (12/19/24 0600)  SpO2: 96 % (12/19/24 0852)  Exam:   Physical Exam  Vitals and nursing note reviewed.   Constitutional:  "      General: She is not in acute distress.     Appearance: She is ill-appearing. She is not toxic-appearing.   HENT:      Head: Normocephalic.      Nose: Nose normal.      Mouth/Throat:      Mouth: Mucous membranes are dry.   Eyes:      General: No scleral icterus.     Conjunctiva/sclera: Conjunctivae normal.   Cardiovascular:      Rate and Rhythm: Normal rate.      Pulses: Normal pulses.           Radial pulses are 2+ on the right side and 2+ on the left side.      Heart sounds: Normal heart sounds.   Pulmonary:      Effort: Pulmonary effort is normal.      Breath sounds: Normal breath sounds.   Abdominal:      General: Bowel sounds are normal. There is no distension.      Palpations: Abdomen is soft.      Tenderness: There is no abdominal tenderness.   Musculoskeletal:         General: No tenderness.   Skin:     General: Skin is dry.      Coloration: Skin is not jaundiced.   Neurological:      Mental Status: She is alert. Mental status is at baseline.   Psychiatric:         Mood and Affect: Mood normal.         Behavior: Behavior normal. Behavior is cooperative.          Medication Adjustments and Discharge Medications:  Discharge Medication List: See after visit summary for reconciled discharge medications.   Medication Dosing Tapers - Please refer to Discharge Medication List for details on any medication dosing tapers (if applicable to patient).   Summary of Medication Adjustments made as a result of this hospitalization: as noted on med rec  Medications being temporarily held (include recommended restart time): as noted on med rec    Wound Care Recommendations:  When applicable, please see wound care section of After Visit Summary.    Instructions for any Catheters / Lines Present at Discharge (including removal date, if applicable): none    Diet Recommendations at Discharge:  Diet -        Diet Orders   (From admission, onward)                 Start     Ordered    12/13/24 1953  Diet Regular; Regular House   Diet effective now        References:    Adult Nutrition Support Algorithm    RD Therapeutic Diet Order Protocol   Question Answer Comment   Diet Type Regular    Regular Regular House    RD to adjust diet per protocol? Yes        12/13/24 1957                    Mobility at time of Discharge:   Basic Mobility Inpatient Raw Score: 6  JH-HLM Goal: 2: Bed activities/Dependent transfer  JH-HLM Achieved: 2: Bed activities/Dependent transfer  HLM Goal achieved. Continue to encourage appropriate mobility.    Goals of Care Discussions:  Code Status at Discharge: Level 1 - Full Code  Goals of care were not discussed during this admission.    Discharge instructions/Information to patient and family:   See after visit summary section titled Discharge Instructions for information provided to patient and family.      Planned Readmission: none      Discharge Statement:  I spent 49 minutes discharging the patient. This time was spent on the day of discharge. I had direct contact with the patient on the day of discharge. Greater than 50% of the total time was spent examining patient, answering all patient questions, arranging and discussing plan of care with patient as well as directly providing post-discharge instructions.  Additional time then spent on discharge activities.    **Please Note: This note may have been constructed using a voice recognition system.**

## 2024-12-19 NOTE — PROGRESS NOTES
Patient:    MRN:  [id number]    Marguerite Request ID:  [id number]    Level of care reserved:  Skilled Nursing Facility    Partner Reserved:  [place] [address] [phone]    Clinical needs requested:    Geography searched:  10 miles around [id number]    Start of Service:    Request sent:  11:29am EST on 12/16/2024 by Radha Salazar    Partner reserved:  2:09pm EST on 12/17/2024 by Elsi Guzmán    Choice list shared:  1:49pm EST on 12/17/2024 by Elsi Guzmán

## 2024-12-19 NOTE — PLAN OF CARE
Problem: PAIN - ADULT  Goal: Verbalizes/displays adequate comfort level or baseline comfort level  Description: Interventions:  - Encourage patient to monitor pain and request assistance  - Assess pain using appropriate pain scale  - Administer analgesics based on type and severity of pain and evaluate response  - Implement non-pharmacological measures as appropriate and evaluate response  - Consider cultural and social influences on pain and pain management  - Notify physician/advanced practitioner if interventions unsuccessful or patient reports new pain  Outcome: Progressing     Problem: INFECTION - ADULT  Goal: Absence or prevention of progression during hospitalization  Description: INTERVENTIONS:  - Assess and monitor for signs and symptoms of infection  - Monitor lab/diagnostic results  - Monitor all insertion sites, i.e. indwelling lines, tubes, and drains  - Monitor endotracheal if appropriate and nasal secretions for changes in amount and color  - [place] appropriate cooling/warming therapies per order  - Administer medications as ordered  - Instruct and encourage patient and family to use good hand hygiene technique  - Identify and instruct in appropriate isolation precautions for identified infection/condition  Outcome: Progressing  Goal: Absence of fever/infection during neutropenic period  Description: INTERVENTIONS:  - Monitor WBC    Outcome: Progressing     Problem: SAFETY ADULT  Goal: Patient will remain free of falls  Description: INTERVENTIONS:  - Educate patient/family on patient safety including physical limitations  - Instruct patient to call for assistance with activity   - Consult OT/PT to assist with strengthening/mobility   - Keep Call bell within reach  - Keep bed low and locked with side rails adjusted as appropriate  - Keep care items and personal belongings within reach  - Initiate and maintain comfort rounds  - Make Fall Risk Sign visible to staff  - Offer Toileting every  Hours,  in advance of need  - Initiate/Maintain alarm  - Obtain necessary fall risk management equipment:   - Apply yellow socks and bracelet for high fall risk patients  - Consider moving patient to room near nurses station  Outcome: Progressing  Goal: Maintain or return to baseline ADL function  Description: INTERVENTIONS:  -  Assess patient's ability to carry out ADLs; assess patient's baseline for ADL function and identify physical deficits which impact ability to perform ADLs (bathing, care of mouth/teeth, toileting, grooming, dressing, etc.)  - Assess/evaluate cause of self-care deficits   - Assess range of motion  - Assess patient's mobility; develop plan if impaired  - Assess patient's need for assistive devices and provide as appropriate  - Encourage maximum independence but intervene and supervise when necessary  - Involve family in performance of ADLs  - Assess for home care needs following discharge   - Consider OT consult to assist with ADL evaluation and planning for discharge  - Provide patient education as appropriate  Outcome: Progressing  Goal: Maintains/Returns to pre admission functional level  Description: INTERVENTIONS:  - Perform AM-PAC 6 Click Basic Mobility/ Daily Activity assessment daily.  - Set and communicate daily mobility goal to care team and patient/family/caregiver.   - Collaborate with rehabilitation services on mobility goals if consulted  - Perform Range of Motion  times a day.  - Reposition patient every  hours.  - Dangle patient  times a day  - Stand patient  times a day  - Ambulate patient  times a day  - Out of bed to chair  times a day   - Out of bed for meals times a day  - Out of bed for toileting  - Record patient progress and toleration of activity level   Outcome: Progressing     Problem: DISCHARGE PLANNING  Goal: Discharge to home or other facility with appropriate resources  Description: INTERVENTIONS:  - Identify barriers to discharge w/patient and caregiver  - Arrange for  needed discharge resources and transportation as appropriate  - Identify discharge learning needs (meds, wound care, etc.)  - Arrange for interpretive services to assist at discharge as needed  - Refer to Case Management Department for coordinating discharge planning if the patient needs post-hospital services based on physician/advanced practitioner order or complex needs related to functional status, cognitive ability, or social support system  Outcome: Progressing     Problem: Knowledge Deficit  Goal: Patient/family/caregiver demonstrates understanding of disease process, treatment plan, medications, and discharge instructions  Description: Complete learning assessment and assess knowledge base.  Interventions:  - Provide teaching at level of understanding  - Provide teaching via preferred learning methods  Outcome: Progressing     Problem: MUSCULOSKELETAL - ADULT  Goal: Maintain or return mobility to safest level of function  Description: INTERVENTIONS:  - Assess patient's ability to carry out ADLs; assess patient's baseline for ADL function and identify physical deficits which impact ability to perform ADLs (bathing, care of mouth/teeth, toileting, grooming, dressing, etc.)  - Assess/evaluate cause of self-care deficits   - Assess range of motion  - Assess patient's mobility  - Assess patient's need for assistive devices and provide as appropriate  - Encourage maximum independence but intervene and supervise when necessary  - Involve family in performance of ADLs  - Assess for home care needs following discharge   - Consider OT consult to assist with ADL evaluation and planning for discharge  - Provide patient education as appropriate  Outcome: Progressing  Goal: Maintain proper alignment of affected body part  Description: INTERVENTIONS:  - Support, maintain and protect limb and body alignment  - Provide patient/ family with appropriate education  Outcome: Progressing     Problem: Prexisting or High Potential  for Compromised Skin Integrity  Goal: Skin integrity is maintained or improved  Description: INTERVENTIONS:  - Identify patients at risk for skin breakdown  - Assess and monitor skin integrity  - Assess and monitor nutrition and hydration status  - Monitor labs   - Assess for incontinence   - Turn and reposition patient  - Assist with mobility/ambulation  - Relieve pressure over bony prominences  - Avoid friction and shearing  - Provide appropriate hygiene as needed including keeping skin clean and dry  - Evaluate need for skin moisturizer/barrier cream  - Collaborate with interdisciplinary team   - Patient/family teaching  - Consider wound care consult   Outcome: Progressing     Problem: Nutrition/Hydration-ADULT  Goal: Nutrient/Hydration intake appropriate for improving, restoring or maintaining nutritional needs  Description: Monitor and assess patient's nutrition/hydration status for malnutrition. Collaborate with interdisciplinary team and initiate plan and interventions as ordered.  Monitor patient's weight and dietary intake as ordered or per policy. Utilize nutrition screening tool and intervene as necessary. Determine patient's food preferences and provide high-protein, high-caloric foods as appropriate.     INTERVENTIONS:  - Monitor oral intake, urinary output, labs, and treatment plans  - Assess nutrition and hydration status and recommend course of action  - Evaluate amount of meals eaten  - Assist patient with eating if necessary   - Allow adequate time for meals  - Recommend/ encourage appropriate diets, oral nutritional supplements, and vitamin/mineral supplements  - Order, calculate, and assess calorie counts as needed  - Recommend, monitor, and adjust tube feedings and TPN/PPN based on assessed needs  - Assess need for intravenous fluids  - Provide specific nutrition/hydration education as appropriate  - Include patient/family/caregiver in decisions related to nutrition  Outcome: Progressing

## 2024-12-19 NOTE — PLAN OF CARE
Problem: PAIN - ADULT  Goal: Verbalizes/displays adequate comfort level or baseline comfort level  Description: Interventions:  - Encourage patient to monitor pain and request assistance  - Assess pain using appropriate pain scale  - Administer analgesics based on type and severity of pain and evaluate response  - Implement non-pharmacological measures as appropriate and evaluate response  - Consider cultural and social influences on pain and pain management  - Notify physician/advanced practitioner if interventions unsuccessful or patient reports new pain  Outcome: Progressing     Problem: INFECTION - ADULT  Goal: Absence or prevention of progression during hospitalization  Description: INTERVENTIONS:  - Assess and monitor for signs and symptoms of infection  - Monitor lab/diagnostic results  - Monitor all insertion sites, i.e. indwelling lines, tubes, and drains  - Monitor endotracheal if appropriate and nasal secretions for changes in amount and color  - [place] appropriate cooling/warming therapies per order  - Administer medications as ordered  - Instruct and encourage patient and family to use good hand hygiene technique  - Identify and instruct in appropriate isolation precautions for identified infection/condition  Outcome: Progressing  Goal: Absence of fever/infection during neutropenic period  Description: INTERVENTIONS:  - Monitor WBC    Outcome: Progressing     Problem: SAFETY ADULT  Goal: Patient will remain free of falls  Description: INTERVENTIONS:  - Educate patient/family on patient safety including physical limitations  - Instruct patient to call for assistance with activity   - Consult OT/PT to assist with strengthening/mobility   - Keep Call bell within reach  - Keep bed low and locked with side rails adjusted as appropriate  - Keep care items and personal belongings within reach  - Obtain necessary fall risk management equipment:   - Apply yellow socks and bracelet for high fall risk  patients  - Consider moving patient to room near nurses station  Outcome: Progressing  Goal: Maintain or return to baseline ADL function  Description: INTERVENTIONS:  -  Assess patient's ability to carry out ADLs; assess patient's baseline for ADL function and identify physical deficits which impact ability to perform ADLs (bathing, care of mouth/teeth, toileting, grooming, dressing, etc.)  - Assess/evaluate cause of self-care deficits   - Assess range of motion  - Assess patient's mobility; develop plan if impaired  - Assess patient's need for assistive devices and provide as appropriate  - Encourage maximum independence but intervene and supervise when necessary  - Involve family in performance of ADLs  - Assess for home care needs following discharge   - Consider OT consult to assist with ADL evaluation and planning for discharge  - Provide patient education as appropriate  Outcome: Progressing  Goal: Maintains/Returns to pre admission functional level  Description: INTERVENTIONS:  - Perform AM-PAC 6 Click Basic Mobility/ Daily Activity assessment daily.  - Set and communicate daily mobility goal to care team and patient/family/caregiver.   - Collaborate with rehabilitation services on mobility goals if consulted  - Out of bed for toileting  - Record patient progress and toleration of activity level   Outcome: Progressing     Problem: DISCHARGE PLANNING  Goal: Discharge to home or other facility with appropriate resources  Description: INTERVENTIONS:  - Identify barriers to discharge w/patient and caregiver  - Arrange for needed discharge resources and transportation as appropriate  - Identify discharge learning needs (meds, wound care, etc.)  - Arrange for interpretive services to assist at discharge as needed  - Refer to Case Management Department for coordinating discharge planning if the patient needs post-hospital services based on physician/advanced practitioner order or complex needs related to functional  status, cognitive ability, or social support system  Outcome: Progressing     Problem: Knowledge Deficit  Goal: Patient/family/caregiver demonstrates understanding of disease process, treatment plan, medications, and discharge instructions  Description: Complete learning assessment and assess knowledge base.  Interventions:  - Provide teaching at level of understanding  - Provide teaching via preferred learning methods  Outcome: Progressing     Problem: MUSCULOSKELETAL - ADULT  Goal: Maintain or return mobility to safest level of function  Description: INTERVENTIONS:  - Assess patient's ability to carry out ADLs; assess patient's baseline for ADL function and identify physical deficits which impact ability to perform ADLs (bathing, care of mouth/teeth, toileting, grooming, dressing, etc.)  - Assess/evaluate cause of self-care deficits   - Assess range of motion  - Assess patient's mobility  - Assess patient's need for assistive devices and provide as appropriate  - Encourage maximum independence but intervene and supervise when necessary  - Involve family in performance of ADLs  - Assess for home care needs following discharge   - Consider OT consult to assist with ADL evaluation and planning for discharge  - Provide patient education as appropriate  Outcome: Progressing  Goal: Maintain proper alignment of affected body part  Description: INTERVENTIONS:  - Support, maintain and protect limb and body alignment  - Provide patient/ family with appropriate education  Outcome: Progressing     Problem: Prexisting or High Potential for Compromised Skin Integrity  Goal: Skin integrity is maintained or improved  Description: INTERVENTIONS:  - Identify patients at risk for skin breakdown  - Assess and monitor skin integrity  - Assess and monitor nutrition and hydration status  - Monitor labs   - Assess for incontinence   - Turn and reposition patient  - Assist with mobility/ambulation  - Relieve pressure over bony  prominences  - Avoid friction and shearing  - Provide appropriate hygiene as needed including keeping skin clean and dry  - Evaluate need for skin moisturizer/barrier cream  - Collaborate with interdisciplinary team   - Patient/family teaching  - Consider wound care consult   Outcome: Progressing     Problem: Nutrition/Hydration-ADULT  Goal: Nutrient/Hydration intake appropriate for improving, restoring or maintaining nutritional needs  Description: Monitor and assess patient's nutrition/hydration status for malnutrition. Collaborate with interdisciplinary team and initiate plan and interventions as ordered.  Monitor patient's weight and dietary intake as ordered or per policy. Utilize nutrition screening tool and intervene as necessary. Determine patient's food preferences and provide high-protein, high-caloric foods as appropriate.     INTERVENTIONS:  - Monitor oral intake, urinary output, labs, and treatment plans  - Assess nutrition and hydration status and recommend course of action  - Evaluate amount of meals eaten  - Assist patient with eating if necessary   - Allow adequate time for meals  - Recommend/ encourage appropriate diets, oral nutritional supplements, and vitamin/mineral supplements  - Order, calculate, and assess calorie counts as needed  - Recommend, monitor, and adjust tube feedings and TPN/PPN based on assessed needs  - Assess need for intravenous fluids  - Provide specific nutrition/hydration education as appropriate  - Include patient/family/caregiver in decisions related to nutrition  Outcome: Progressing

## 2024-12-20 ENCOUNTER — PATIENT OUTREACH (OUTPATIENT)
Dept: CASE MANAGEMENT | Facility: OTHER | Age: 89
End: 2024-12-20

## 2024-12-20 NOTE — PROGRESS NOTES
Chart review completed.  Email sent to facility to inform that AC will follow patient.   This Admin Coordinator will continue to monitor via chart review throughout episode.

## 2024-12-23 ENCOUNTER — OFFICE VISIT (OUTPATIENT)
Dept: OBGYN CLINIC | Facility: CLINIC | Age: 89
End: 2024-12-23
Payer: MEDICARE

## 2024-12-23 ENCOUNTER — APPOINTMENT (OUTPATIENT)
Dept: RADIOLOGY | Facility: CLINIC | Age: 89
End: 2024-12-23
Payer: MEDICARE

## 2024-12-23 VITALS — BODY MASS INDEX: 29.32 KG/M2 | WEIGHT: 176 LBS | HEIGHT: 65 IN | RESPIRATION RATE: 18 BRPM

## 2024-12-23 DIAGNOSIS — S82.842A ANKLE FRACTURE, BIMALLEOLAR, CLOSED, LEFT, INITIAL ENCOUNTER: ICD-10-CM

## 2024-12-23 DIAGNOSIS — S82.842A ANKLE FRACTURE, BIMALLEOLAR, CLOSED, LEFT, INITIAL ENCOUNTER: Primary | ICD-10-CM

## 2024-12-23 DIAGNOSIS — S82.409A FIBULA FRACTURE: ICD-10-CM

## 2024-12-23 PROCEDURE — 99213 OFFICE O/P EST LOW 20 MIN: CPT | Performed by: STUDENT IN AN ORGANIZED HEALTH CARE EDUCATION/TRAINING PROGRAM

## 2024-12-23 PROCEDURE — 73600 X-RAY EXAM OF ANKLE: CPT

## 2024-12-23 RX ORDER — ENOXAPARIN SODIUM 100 MG/ML
INJECTION SUBCUTANEOUS DAILY
COMMUNITY
Start: 2024-12-20

## 2024-12-23 NOTE — PROGRESS NOTES
ASSESSMENT/PLAN:    Diagnoses and all orders for this visit:    Ankle fracture, bimalleolar, closed, left, initial encounter  -     XR ankle 2 vw left; Future  -     Durable Medical Equipment    Fibula fracture  -     Ambulatory Referral to Orthopedic Surgery    Other orders  -     Acetaminophen 325 MG CAPS; Take by mouth Every 6 hours  -     enoxaparin (LOVENOX) 30 mg/0.3 mL; Inject under the skin daily      Discussed history, exam, and imaging with patient. Presentation most consistent with left ankle bimalleolar fracture and we will plan for non-operative management at this time given chronic adjacent deformity, chronic skin changes with local wound, and minimal ambulation done prior to injury.  Discussed oral/topical medication regimen. Will plan for continued use of oral pain regimen as needed.  Discussed rehabilitation efforts. Will plan CAM boot with weight bearing as tolerated for transfers but otherwise non weight bearing to the left lower extremity. Discussed recommendation for continued wound care with wound care team and dermatology.  Follow-up with me in 2 weeks for reevaluation and repeat xrays.  _____________________________________________________  CHIEF COMPLAINT:  Chief Complaint   Patient presents with    Left Ankle - Pain       SUBJECTIVE:  Diya Garcia is a 89 y.o. year old female who presents for evaluation of left ankle pain. History is provided by daughter. The issue began on 12/14/24 when she was found to be not wanting to put weight on her left leg. She slept the night if he reclining chair to avoid walking to bed. The next morning on 12/15, she had 2 people helping her to stand, both legs gave out on her, and she began falling with the left foot caught under her body. She was caught before falling to the ground, but she continue to be unable to bear weight on the left leg afterwards. She was brought to the ED at this point. Xrays showed a closed bimalleolar ankle fracture. ED placed her in  a stirrup and posterior leg splint. She was admitted to the hospital and discharged to rehab facility at [facility]. Daughter states she has been helped up once while in the facility but otherwise she lays in bed throughout the day. Patient denies pain at today's visit. She normally lives in a 55+ community alone, but with visitors to help her a few times per day. At baseline, she ambulates with a rolling walker - though daughter acknowledges she did minimal actual walking.      PAST MEDICAL HISTORY:  Past Medical History:   Diagnosis Date    Arthritis 2000    approximately    Breast cancer (HCC)     Cancer (HCC) 2010    approximately    HL (hearing loss) 2010    Hypertension     on and off    Macular degeneration     Visual impairment 2018    macular degeneration       PAST SURGICAL HISTORY:  Past Surgical History:   Procedure Laterality Date    EAR SURGERY      EYE SURGERY      cataracts    HIP SURGERY      JOINT REPLACEMENT  ?    knee & hip    KNEE SURGERY         FAMILY HISTORY:  History reviewed. No pertinent family history.    SOCIAL HISTORY:  Social History     Tobacco Use    Smoking status: Some Days     Current packs/day: 0.25     Average packs/day: 0.3 packs/day for 20.0 years (5.0 ttl pk-yrs)     Types: Cigarettes    Smokeless tobacco: Never   Vaping Use    Vaping status: Never Used   Substance Use Topics    Alcohol use: Yes     Alcohol/week: 4.0 standard drinks of alcohol     Types: 4 Standard drinks or equivalent per week    Drug use: Never       MEDICATIONS:    Current Outpatient Medications:     acetaminophen (TYLENOL) 500 mg tablet, Take 1 tablet (500 mg total) by mouth every 6 (six) hours as needed for mild pain, Disp: 30 tablet, Rfl: 0    Acetaminophen 325 MG CAPS, Take by mouth Every 6 hours, Disp: , Rfl:     albuterol (2.5 mg/3 mL) 0.083 % nebulizer solution, Take 3 mL (2.5 mg total) by nebulization every 6 (six) hours as needed for wheezing or shortness of breath, Disp: 180 mL, Rfl: 5     albuterol (Ventolin HFA) 90 mcg/act inhaler, Inhale 2 puffs every 6 (six) hours as needed for wheezing, Disp: 18 g, Rfl: 5    enalapril (VASOTEC) 10 mg tablet, Take 1 tablet (10 mg total) by mouth daily, Disp: 90 tablet, Rfl: 3    enoxaparin (LOVENOX) 30 mg/0.3 mL, Inject 0.3 mL (30 mg total) under the skin daily, Disp: , Rfl:     enoxaparin (LOVENOX) 30 mg/0.3 mL, Inject under the skin daily, Disp: , Rfl:     escitalopram (LEXAPRO) 5 mg tablet, Take 1 tablet (5 mg total) by mouth daily, Disp: 30 tablet, Rfl: 5    fluticasone (FLONASE) 50 mcg/act nasal spray, 2 sprays into each nostril daily, Disp: 16 g, Rfl: 5    furosemide (LASIX) 20 mg tablet, Take one tab po every other day, Disp: 45 tablet, Rfl: 1    meloxicam (Mobic) 15 mg tablet, Take 1 tablet (15 mg total) by mouth daily, Disp: 30 tablet, Rfl: 1    Multiple Vitamins-Minerals (PreserVision AREDS 2+Multi Vit) CAPS, Take by mouth, Disp: , Rfl:     omeprazole (PriLOSEC) 20 mg delayed release capsule, Take 1 capsule (20 mg total) by mouth daily before breakfast, Disp: 30 capsule, Rfl: 5    potassium chloride (MICRO-K) 10 MEQ CR capsule, Take 2 tabs po when you take Furosemide., Disp: 180 capsule, Rfl: 0    benzonatate (TESSALON) 200 MG capsule, Take 1 capsule (200 mg total) by mouth 3 (three) times a day as needed for cough (Patient not taking: Reported on 12/23/2024), Disp: 20 capsule, Rfl: 0    mupirocin (BACTROBAN) 2 % ointment, Apply topically 2 (two) times a day for 14 days, Disp: 15 g, Rfl: 0    ALLERGIES:  No Known Allergies    Review of systems:   Constitutional: Negative for fatigue, fever or loss of apetite.   HENT: Negative.    Respiratory: Negative for shortness of breath, dyspnea.    Cardiovascular: Negative for chest pain/tightness.   Gastrointestinal: Negative for abdominal pain, N/V.   Endocrine: Negative for cold/heat intolerance, unexplained weight loss/gain.   Genitourinary: Negative for flank pain, dysuria, hematuria.   Musculoskeletal: As in  "HPI   Skin: Negative for rash.    Neurological: Negative for numbness tingling  Psychiatric/Behavioral: Negative for agitation.  _____________________________________________________  PHYSICAL EXAMINATION:    Resp. rate 18, height 5' 5\" (1.651 m), weight 79.8 kg (176 lb).    General: well developed and well nourished, alert, oriented times 3, and appears comfortable  HEENT: Benign, normocephalic, atraumatic  Cardiovascular: regular rate    Pulmonary: No wheezing or stridor  Abdomen: Soft, Nontender  Skin: No masses, erythema, lacerations, fluctation, ulcerations  Neurovascular: as per MS[facility] exam below    MUSCULOSKELETAL EXAMINATION:    Left ankle  No ankle swelling  Flat foot deformity  Chronic venous statis changes with wound to distal medial leg  TTP medial malleoulus, mild TTP over lateral malleolus  Passive ankle dorsiflexion to 5 degrees  Passive ankle platarflexion to 5 degrees  Ankle range of motion limited secondary to pain  Neurovascularly intact distally  2+ DP pulse    _____________________________________________________  STUDIES REVIEWED:  Images personally reviewed by me today:    Xrays of the left ankle taken on 12/23/24 demonstrate previously seen oblique lateral malleolus fracture with mild callous formation. Also demonstrated is transverse medial malleolar fracture with minimal displacement. Significant degenerative changes are noted to the hindfoot/midfoot.      Scribe Attestation      I,:  Gurpreet Michelle PA-C am acting as a scribe while in the presence of the attending physician.:       I,:  Mayo Briggs MD personally performed the services described in this documentation    as scribed in my presence.:             "

## 2024-12-24 ENCOUNTER — PATIENT OUTREACH (OUTPATIENT)
Dept: CASE MANAGEMENT | Facility: OTHER | Age: 89
End: 2024-12-24

## 2024-12-24 ENCOUNTER — TELEPHONE (OUTPATIENT)
Age: 89
End: 2024-12-24

## 2024-12-24 NOTE — TELEPHONE ENCOUNTER
Doctor: Chester  Caller Name: Shante   #: [phone] option3    [facility] called to speak to clinical team regarding Xray ordered by provider.    *No call back necessary unless provider has questions.*

## 2024-12-24 NOTE — TELEPHONE ENCOUNTER
Epic secure chat sent to Dr Briggs and JOSELIN Michelle to notify of Left ankle XR results 12/23/24.

## 2024-12-27 ENCOUNTER — TELEPHONE (OUTPATIENT)
Age: 89
End: 2024-12-27

## 2024-12-27 NOTE — TELEPHONE ENCOUNTER
Caller: [facility]  Alexa Torres     Doctor: cindy     Reason for call: Asked to clarification on CAM boot     Call back#: [phone]

## 2025-01-02 ENCOUNTER — PATIENT OUTREACH (OUTPATIENT)
Dept: CASE MANAGEMENT | Facility: OTHER | Age: OVER 89
End: 2025-01-02

## 2025-01-02 NOTE — PROGRESS NOTES
Call placed to [facility] who confirmed the patient is currently admitted to their facility for STR no LCD at this time. This Admin Coordinator will continue to monitor via chart review.

## 2025-01-07 ENCOUNTER — OFFICE VISIT (OUTPATIENT)
Dept: OBGYN CLINIC | Facility: CLINIC | Age: OVER 89
End: 2025-01-07
Payer: MEDICARE

## 2025-01-07 ENCOUNTER — APPOINTMENT (OUTPATIENT)
Dept: RADIOLOGY | Facility: CLINIC | Age: OVER 89
End: 2025-01-07
Payer: MEDICARE

## 2025-01-07 VITALS — BODY MASS INDEX: 29.32 KG/M2 | HEIGHT: 65 IN | WEIGHT: 176 LBS | RESPIRATION RATE: 18 BRPM

## 2025-01-07 DIAGNOSIS — S82.892G ANKLE FRACTURE, LEFT, CLOSED, WITH DELAYED HEALING, SUBSEQUENT ENCOUNTER: Primary | ICD-10-CM

## 2025-01-07 DIAGNOSIS — S82.842A ANKLE FRACTURE, BIMALLEOLAR, CLOSED, LEFT, INITIAL ENCOUNTER: ICD-10-CM

## 2025-01-07 PROCEDURE — 73610 X-RAY EXAM OF ANKLE: CPT

## 2025-01-07 PROCEDURE — 99213 OFFICE O/P EST LOW 20 MIN: CPT | Performed by: STUDENT IN AN ORGANIZED HEALTH CARE EDUCATION/TRAINING PROGRAM

## 2025-01-07 NOTE — PROGRESS NOTES
ASSESSMENT/PLAN:    Diagnoses and all orders for this visit:    Ankle fracture, left, closed, with delayed healing, subsequent encounter  -     XR ankle 3+ vw left; Future      Discussed history, exam, and imaging with patient. Presentation most consistent with left ankle bimalleolar fracture and we will plan for non-operative management at this time given chronic adjacent deformity, chronic skin changes with local wound, and minimal ambulation done prior to injury.  Discussed oral/topical medication regimen. Will plan for continued use of Tylenol primarily for pain control.   Discussed rehabilitation efforts. Will plan CAM boot with protected weight bearing with walker at all times. If anything changes with pain control or new deformity is appreciated they will let us know. Discussed recommendation for continued wound care with wound care team and dermatology. Recomendatoin or PT for ankle ROM.   Follow-up with me in 4 weeks for reevaluation and repeat xrays.  _____________________________________________________  CHIEF COMPLAINT:  Chief Complaint   Patient presents with    Left Ankle - Follow-up       SUBJECTIVE:  Diya Garcia is a 89 y.o. year old female who presents for repeat evaluation of left ankle pain. She is feeling better but still has some pain to her ankle. She has gotten up with a walker for transfers. Wearing the boot at all times.     Per prior history: The issue began on 12/14/24 when she was found to be not wanting to put weight on her left leg. She slept the night in reclining chair to avoid walking to bed. The next morning on 12/15, she had 2 people helping her to stand, both legs gave out on her, and she began falling with the left foot caught under her body. She was caught before falling to the ground, but she continue to be unable to bear weight on the left leg afterwards. She was brought to the ED at this point. Xrays showed a closed bimalleolar ankle fracture. ED placed her in a stirrup and  posterior leg splint. She was admitted to the hospital and discharged to rehab facility at [place]. She normally lives in a 55+ community alone, but with visitors to help her a few times per day. At baseline, she ambulates with a rolling walker - though daughter acknowledges she did minimal actual walking.      PAST MEDICAL HISTORY:  Past Medical History:   Diagnosis Date    Arthritis 2000    approximately    Breast cancer (HCC)     Cancer (HCC) 2010    approximately    HL (hearing loss) 2010    Hypertension     on and off    Macular degeneration     Visual impairment 2018    macular degeneration       PAST SURGICAL HISTORY:  Past Surgical History:   Procedure Laterality Date    EAR SURGERY      EYE SURGERY      cataracts    HIP SURGERY      JOINT REPLACEMENT  ?    knee & hip    KNEE SURGERY         FAMILY HISTORY:  History reviewed. No pertinent family history.    SOCIAL HISTORY:  Social History     Tobacco Use    Smoking status: Some Days     Current packs/day: 0.25     Average packs/day: 0.3 packs/day for 20.0 years (5.0 ttl pk-yrs)     Types: Cigarettes    Smokeless tobacco: Never   Vaping Use    Vaping status: Never Used   Substance Use Topics    Alcohol use: Yes     Alcohol/week: 4.0 standard drinks of alcohol     Types: 4 Standard drinks or equivalent per week    Drug use: Never       MEDICATIONS:    Current Outpatient Medications:     enoxaparin (LOVENOX) 30 mg/0.3 mL, Inject 0.3 mL (30 mg total) under the skin daily, Disp: , Rfl:     escitalopram (LEXAPRO) 5 mg tablet, Take 1 tablet (5 mg total) by mouth daily, Disp: 30 tablet, Rfl: 5    fluticasone (FLONASE) 50 mcg/act nasal spray, 2 sprays into each nostril daily, Disp: 16 g, Rfl: 5    furosemide (LASIX) 20 mg tablet, Take one tab po every other day, Disp: 45 tablet, Rfl: 1    Multiple Vitamins-Minerals (PreserVision AREDS 2+Multi Vit) CAPS, Take by mouth, Disp: , Rfl:     omeprazole (PriLOSEC) 20 mg delayed release capsule, Take 1 capsule (20 mg  total) by mouth daily before breakfast, Disp: 30 capsule, Rfl: 5    potassium chloride (MICRO-K) 10 MEQ CR capsule, Take 2 tabs po when you take Furosemide., Disp: 180 capsule, Rfl: 0    acetaminophen (TYLENOL) 500 mg tablet, Take 1 tablet (500 mg total) by mouth every 6 (six) hours as needed for mild pain, Disp: 30 tablet, Rfl: 0    Acetaminophen 325 MG CAPS, Take by mouth Every 6 hours, Disp: , Rfl:     albuterol (2.5 mg/3 mL) 0.083 % nebulizer solution, Take 3 mL (2.5 mg total) by nebulization every 6 (six) hours as needed for wheezing or shortness of breath, Disp: 180 mL, Rfl: 5    albuterol (Ventolin HFA) 90 mcg/act inhaler, Inhale 2 puffs every 6 (six) hours as needed for wheezing, Disp: 18 g, Rfl: 5    benzonatate (TESSALON) 200 MG capsule, Take 1 capsule (200 mg total) by mouth 3 (three) times a day as needed for cough (Patient not taking: Reported on 12/23/2024), Disp: 20 capsule, Rfl: 0    enalapril (VASOTEC) 10 mg tablet, Take 1 tablet (10 mg total) by mouth daily, Disp: 90 tablet, Rfl: 3    enoxaparin (LOVENOX) 30 mg/0.3 mL, Inject under the skin daily, Disp: , Rfl:     meloxicam (Mobic) 15 mg tablet, Take 1 tablet (15 mg total) by mouth daily (Patient not taking: Reported on 1/7/2025), Disp: 30 tablet, Rfl: 1    mupirocin (BACTROBAN) 2 % ointment, Apply topically 2 (two) times a day for 14 days, Disp: 15 g, Rfl: 0    ALLERGIES:  No Known Allergies    Review of systems:   Constitutional: Negative for fatigue, fever or loss of apetite.   HENT: Negative.    Respiratory: Negative for shortness of breath, dyspnea.    Cardiovascular: Negative for chest pain/tightness.   Gastrointestinal: Negative for abdominal pain, N/V.   Endocrine: Negative for cold/heat intolerance, unexplained weight loss/gain.   Genitourinary: Negative for flank pain, dysuria, hematuria.   Musculoskeletal: As in HPI   Skin: Negative for rash.    Neurological: Negative for numbness tingling  Psychiatric/Behavioral: Negative for  "agitation.  _____________________________________________________  PHYSICAL EXAMINATION:    Resp. rate 18, height 5' 5\" (1.651 m), weight 79.8 kg (176 lb).    General: well developed and well nourished, alert, oriented times 3, and appears comfortable  HEENT: Benign, normocephalic, atraumatic  Cardiovascular: regular rate    Pulmonary: No wheezing or stridor  Abdomen: Soft, Nontender  Skin: No masses, erythema, lacerations, fluctation, ulcerations  Neurovascular: as per MS[facility] exam below    MUSCULOSKELETAL EXAMINATION:    Left ankle  No ankle swelling  Flat foot deformity  Chronic venous statis changes with wound to distal medial leg  TTP medial malleoulus, minimal TTP over lateral malleolus  Passive ankle dorsiflexion to 5 degrees  Passive ankle platarflexion to 5 degrees  Neurovascularly intact distally  2+ DP pulse    _____________________________________________________  STUDIES REVIEWED:  Images personally reviewed by me today:    Xrays of the left ankle taken on 12/23/24 demonstrate previously seen oblique lateral malleolus fracture with mild callous formation. Also demonstrated is transverse medial malleolar fracture with minimal displacement. Significant degenerative changes are noted to the hindfoot/midfoot.      Scribe Attestation      I,:   am acting as a scribe while in the presence of the attending physician.:       I,:   personally performed the services described in this documentation    as scribed in my presence.:             "

## 2025-01-10 ENCOUNTER — PATIENT OUTREACH (OUTPATIENT)
Dept: CASE MANAGEMENT | Facility: OTHER | Age: OVER 89
End: 2025-01-10

## 2025-01-17 ENCOUNTER — PATIENT OUTREACH (OUTPATIENT)
Dept: CASE MANAGEMENT | Facility: OTHER | Age: OVER 89
End: 2025-01-17

## 2025-02-10 ENCOUNTER — OFFICE VISIT (OUTPATIENT)
Dept: OBGYN CLINIC | Facility: CLINIC | Age: OVER 89
End: 2025-02-10
Payer: MEDICARE

## 2025-02-10 ENCOUNTER — APPOINTMENT (OUTPATIENT)
Dept: RADIOLOGY | Facility: CLINIC | Age: OVER 89
End: 2025-02-10
Payer: MEDICARE

## 2025-02-10 VITALS — RESPIRATION RATE: 18 BRPM | BODY MASS INDEX: 29.32 KG/M2 | WEIGHT: 176 LBS | HEIGHT: 65 IN

## 2025-02-10 DIAGNOSIS — S82.892G ANKLE FRACTURE, LEFT, CLOSED, WITH DELAYED HEALING, SUBSEQUENT ENCOUNTER: Primary | ICD-10-CM

## 2025-02-10 DIAGNOSIS — S82.892G ANKLE FRACTURE, LEFT, CLOSED, WITH DELAYED HEALING, SUBSEQUENT ENCOUNTER: ICD-10-CM

## 2025-02-10 DIAGNOSIS — M17.12 PRIMARY OSTEOARTHRITIS OF LEFT KNEE: ICD-10-CM

## 2025-02-10 PROCEDURE — 73610 X-RAY EXAM OF ANKLE: CPT

## 2025-02-10 PROCEDURE — 99213 OFFICE O/P EST LOW 20 MIN: CPT | Performed by: STUDENT IN AN ORGANIZED HEALTH CARE EDUCATION/TRAINING PROGRAM

## 2025-02-10 PROCEDURE — 20610 DRAIN/INJ JOINT/BURSA W/O US: CPT

## 2025-02-10 RX ORDER — NYSTATIN 100000 [USP'U]/G
POWDER TOPICAL
COMMUNITY
Start: 2025-02-01

## 2025-02-10 RX ORDER — POTASSIUM CHLORIDE 1500 MG/1
TABLET, EXTENDED RELEASE ORAL
COMMUNITY
Start: 2025-02-04

## 2025-02-10 RX ADMIN — BUPIVACAINE HYDROCHLORIDE 4 ML: 2.5 INJECTION, SOLUTION INFILTRATION; PERINEURAL at 15:15

## 2025-02-10 RX ADMIN — TRIAMCINOLONE ACETONIDE 80 MG: 40 INJECTION, SUSPENSION INTRA-ARTICULAR; INTRAMUSCULAR at 15:15

## 2025-02-10 RX ADMIN — LIDOCAINE HYDROCHLORIDE 4 ML: 10 INJECTION, SOLUTION INFILTRATION; PERINEURAL at 15:15

## 2025-02-11 RX ORDER — TRIAMCINOLONE ACETONIDE 40 MG/ML
80 INJECTION, SUSPENSION INTRA-ARTICULAR; INTRAMUSCULAR
Status: COMPLETED | OUTPATIENT
Start: 2025-02-10 | End: 2025-02-10

## 2025-02-11 RX ORDER — LIDOCAINE HYDROCHLORIDE 10 MG/ML
4 INJECTION, SOLUTION INFILTRATION; PERINEURAL
Status: COMPLETED | OUTPATIENT
Start: 2025-02-10 | End: 2025-02-10

## 2025-02-11 RX ORDER — BUPIVACAINE HYDROCHLORIDE 2.5 MG/ML
4 INJECTION, SOLUTION INFILTRATION; PERINEURAL
Status: COMPLETED | OUTPATIENT
Start: 2025-02-10 | End: 2025-02-10

## 2025-03-25 ENCOUNTER — OFFICE VISIT (OUTPATIENT)
Dept: OBGYN CLINIC | Facility: CLINIC | Age: OVER 89
End: 2025-03-25
Payer: MEDICARE

## 2025-03-25 VITALS — HEIGHT: 65 IN | BODY MASS INDEX: 29.29 KG/M2 | RESPIRATION RATE: 18 BRPM

## 2025-03-25 DIAGNOSIS — S82.892G ANKLE FRACTURE, LEFT, CLOSED, WITH DELAYED HEALING, SUBSEQUENT ENCOUNTER: Primary | ICD-10-CM

## 2025-03-25 PROCEDURE — 99213 OFFICE O/P EST LOW 20 MIN: CPT | Performed by: STUDENT IN AN ORGANIZED HEALTH CARE EDUCATION/TRAINING PROGRAM

## 2025-03-25 RX ORDER — LOPERAMIDE HYDROCHLORIDE 2 MG/1
CAPSULE ORAL
COMMUNITY
Start: 2025-03-04

## 2025-03-25 NOTE — PROGRESS NOTES
ASSESSMENT/PLAN:    Assessment & Plan  Ankle fracture, left, closed, with delayed healing, subsequent encounter  Discussed history, exam, and imaging with patient. Presentation most consistent with healed bimalleolar fracture and we will plan for non-operative management at this time.  Discussed oral/topical medication regimen. Will plan for OTC analgesics as needed for symptom management  She may WBAT, no specific restrictions. Use walker or cane for balance.  Follow up prn     Return if symptoms worsen or fail to improve.    _____________________________________________________  CHIEF COMPLAINT:  Chief Complaint   Patient presents with    Left Ankle - Follow-up       SUBJECTIVE:  Diya Garcia is a 89 y.o. year old female who presents for follow up evaluation of left ankle pain, s/p bimalleolar fracture DOI: 12/14/2024. Today the patient reports she is doing well overall. The patient is not complaining of any ankle pain. She presents in wheelchair today, without cam boot.    PAST MEDICAL HISTORY:  Past Medical History:   Diagnosis Date    Arthritis 2000    approximately    Breast cancer (HCC)     Cancer (HCC) 2010    approximately    HL (hearing loss) 2010    Hypertension     on and off    Macular degeneration     Visual impairment 2018    macular degeneration       PAST SURGICAL HISTORY:  Past Surgical History:   Procedure Laterality Date    EAR SURGERY      EYE SURGERY      cataracts    HIP SURGERY      JOINT REPLACEMENT  ?    knee & hip    KNEE SURGERY         FAMILY HISTORY:  History reviewed. No pertinent family history.    SOCIAL HISTORY:  Social History     Tobacco Use    Smoking status: Some Days     Current packs/day: 0.25     Average packs/day: 0.3 packs/day for 20.0 years (5.0 ttl pk-yrs)     Types: Cigarettes    Smokeless tobacco: Never   Vaping Use    Vaping status: Never Used   Substance Use Topics    Alcohol use: Yes     Alcohol/week: 4.0 standard drinks of alcohol     Types: 4 Standard drinks or  equivalent per week    Drug use: Never       MEDICATIONS:    Current Outpatient Medications:     acetaminophen (TYLENOL) 500 mg tablet, Take 1 tablet (500 mg total) by mouth every 6 (six) hours as needed for mild pain, Disp: 30 tablet, Rfl: 0    albuterol (2.5 mg/3 mL) 0.083 % nebulizer solution, Take 3 mL (2.5 mg total) by nebulization every 6 (six) hours as needed for wheezing or shortness of breath, Disp: 180 mL, Rfl: 5    albuterol (Ventolin HFA) 90 mcg/act inhaler, Inhale 2 puffs every 6 (six) hours as needed for wheezing, Disp: 18 g, Rfl: 5    enalapril (VASOTEC) 10 mg tablet, Take 1 tablet (10 mg total) by mouth daily, Disp: 90 tablet, Rfl: 3    escitalopram (LEXAPRO) 5 mg tablet, Take 1 tablet (5 mg total) by mouth daily, Disp: 30 tablet, Rfl: 5    fluticasone (FLONASE) 50 mcg/act nasal spray, 2 sprays into each nostril daily, Disp: 16 g, Rfl: 5    furosemide (LASIX) 20 mg tablet, Take one tab po every other day, Disp: 45 tablet, Rfl: 1    loperamide (IMODIUM) 2 mg capsule, , Disp: , Rfl:     Multiple Vitamins-Minerals (PreserVision AREDS 2+Multi Vit) CAPS, Take by mouth, Disp: , Rfl:     nystatin (MYCOSTATIN) powder, , Disp: , Rfl:     potassium chloride (Klor-Con M20) 20 mEq tablet, , Disp: , Rfl:     Acetaminophen 325 MG CAPS, Take by mouth Every 6 hours (Patient not taking: Reported on 2/10/2025), Disp: , Rfl:     benzonatate (TESSALON) 200 MG capsule, Take 1 capsule (200 mg total) by mouth 3 (three) times a day as needed for cough, Disp: 20 capsule, Rfl: 0    enoxaparin (LOVENOX) 30 mg/0.3 mL, Inject 0.3 mL (30 mg total) under the skin daily (Patient not taking: Reported on 3/25/2025), Disp: , Rfl:     enoxaparin (LOVENOX) 30 mg/0.3 mL, Inject under the skin daily (Patient not taking: Reported on 3/25/2025), Disp: , Rfl:     meloxicam (Mobic) 15 mg tablet, Take 1 tablet (15 mg total) by mouth daily (Patient not taking: Reported on 1/7/2025), Disp: 30 tablet, Rfl: 1    mupirocin (BACTROBAN) 2 % ointment,  "Apply topically 2 (two) times a day for 14 days, Disp: 15 g, Rfl: 0    omeprazole (PriLOSEC) 20 mg delayed release capsule, Take 1 capsule (20 mg total) by mouth daily before breakfast, Disp: 30 capsule, Rfl: 5    potassium chloride (MICRO-K) 10 MEQ CR capsule, Take 2 tabs po when you take Furosemide., Disp: 180 capsule, Rfl: 0    ALLERGIES:  No Known Allergies    Review of systems:   Constitutional: Negative for fatigue, fever or loss of apetite.   HENT: Negative.    Respiratory: Negative for shortness of breath, dyspnea.    Cardiovascular: Negative for chest pain/tightness.   Gastrointestinal: Negative for abdominal pain, N/V.   Endocrine: Negative for cold/heat intolerance, unexplained weight loss/gain.   Genitourinary: Negative for flank pain, dysuria, hematuria.   Musculoskeletal: As in HPI   Skin: Negative for rash.    Neurological: Negative for numbness tingling  Psychiatric/Behavioral: Negative for agitation.  _____________________________________________________  PHYSICAL EXAMINATION:    Resp. rate 18, height 5' 5\" (1.651 m).    General: well developed and well nourished, alert, oriented times 3, and appears comfortable  HEENT: Benign, normocephalic, atraumatic  Cardiovascular: regular rate    Pulmonary: No wheezing or stridor  Abdomen: Soft, Nontender  Skin: No masses, erythema, lacerations, fluctation, ulcerations  Neurovascular: as per MSK exam below    MUSCULOSKELETAL EXAMINATION:  Left Ankle  Active range of motion: 5 degrees of dorsiflexion to 5 degrees plantar flexion  There is normal range of motion of toes in plantar flexion and dorsiflexion.   There is no swelling and ecchymosis present over the ankle.   There is no tenderness present over the ankle.     Sensation is intact to light touch superficial peroneal, deep peroneal, tibial, saphenous, and sural nerve distributions.    2+ DP pulse present.    _____________________________________________________  STUDIES REVIEWED:    No new images " today    Scribe Attestation      I,:  Wendy Esteves am acting as a scribe while in the presence of the attending physician.:       I,:  Mayo Briggs MD personally performed the services described in this documentation    as scribed in my presence.:

## 2025-04-01 ENCOUNTER — OFFICE VISIT (OUTPATIENT)
Dept: INTERNAL MEDICINE CLINIC | Facility: CLINIC | Age: OVER 89
End: 2025-04-01
Payer: MEDICARE

## 2025-04-01 VITALS
SYSTOLIC BLOOD PRESSURE: 122 MMHG | WEIGHT: 180 LBS | DIASTOLIC BLOOD PRESSURE: 74 MMHG | HEIGHT: 65 IN | BODY MASS INDEX: 29.99 KG/M2

## 2025-04-01 DIAGNOSIS — M70.72 ISCHIAL BURSITIS OF LEFT SIDE: ICD-10-CM

## 2025-04-01 DIAGNOSIS — I10 PRIMARY HYPERTENSION: ICD-10-CM

## 2025-04-01 DIAGNOSIS — C50.112 MALIGNANT NEOPLASM OF CENTRAL PORTION OF LEFT FEMALE BREAST, UNSPECIFIED ESTROGEN RECEPTOR STATUS (HCC): ICD-10-CM

## 2025-04-01 DIAGNOSIS — E78.2 MIXED HYPERLIPIDEMIA: ICD-10-CM

## 2025-04-01 DIAGNOSIS — L89.95 PRESSURE INJURY, UNSTAGEABLE, UNSPECIFIED LOCATION (HCC): Primary | ICD-10-CM

## 2025-04-01 PROCEDURE — G2211 COMPLEX E/M VISIT ADD ON: HCPCS | Performed by: INTERNAL MEDICINE

## 2025-04-01 PROCEDURE — 99214 OFFICE O/P EST MOD 30 MIN: CPT | Performed by: INTERNAL MEDICINE

## 2025-04-01 NOTE — PROGRESS NOTES
"Name: Diya Garcia      : 1929      MRN: [id number]  Encounter Provider: All Edouard MD  Encounter Date: 2025   Encounter department: [facility] INTERNAL MEDICINE [place]  :  Assessment & Plan  Pressure injury, unstageable, unspecified location (HCC)         Malignant neoplasm of central portion of left female breast, unspecified estrogen receptor status (HCC)         Ischial bursitis of left side    Orders:    Ambulatory Referral to Orthopedic Surgery; Future    Mixed hyperlipidemia      Primary hypertension    Orders:    CBC and differential; Future    Comprehensive metabolic panel; Future    TSH, 3rd generation with Free T4 reflex; Future          Depression Screening and Follow-up Plan:   Patient with underlying depression and was advised to continue current medications as prescribed.     Tobacco Cessation Counseling: Tobacco cessation counseling was provided. The patient is sincerely urged to quit consumption of tobacco. She is not ready to quit tobacco. Medication options discussed.       History of Present Illness   Patient is here for routine follow up, reviewed chronic medical problems      Review of Systems   Constitutional:  Negative for chills and fever.   HENT:  Negative for ear pain and sore throat.    Eyes:  Negative for pain and visual disturbance.   Respiratory:  Negative for cough and shortness of breath.    Cardiovascular:  Negative for chest pain and palpitations.   Gastrointestinal:  Negative for abdominal pain and vomiting.   Genitourinary:  Negative for dysuria and hematuria.   Musculoskeletal:  Negative for arthralgias and back pain.   Skin:  Negative for color change and rash.   Neurological:  Negative for seizures and syncope.   All other systems reviewed and are negative.      Objective   /74 (BP Location: Right arm, Patient Position: Sitting, Cuff Size: Standard)   Ht 5' 5\" (1.651 m)   Wt 81.6 kg (180 lb)   BMI 29.95 kg/m²      Physical Exam  Vitals and " nursing note reviewed.   Constitutional:       General: She is not in acute distress.     Appearance: She is well-developed.   HENT:      Head: Normocephalic and atraumatic.   Eyes:      Conjunctiva/sclera: Conjunctivae normal.   Cardiovascular:      Rate and Rhythm: Normal rate and regular rhythm.      Heart sounds: No murmur heard.  Pulmonary:      Effort: Pulmonary effort is normal. No respiratory distress.      Breath sounds: Normal breath sounds.   Abdominal:      Palpations: Abdomen is soft.      Tenderness: There is no abdominal tenderness.   Musculoskeletal:         General: No swelling.      Cervical back: Neck supple.   Skin:     General: Skin is warm and dry.      Capillary Refill: Capillary refill takes less than 2 seconds.   Neurological:      Mental Status: She is alert.   Psychiatric:         Mood and Affect: Mood normal.

## 2025-04-30 DIAGNOSIS — T17.908S ASPIRATION INTO AIRWAY, SEQUELA: Primary | ICD-10-CM

## 2025-05-21 ENCOUNTER — PATIENT MESSAGE (OUTPATIENT)
Dept: INTERNAL MEDICINE CLINIC | Facility: CLINIC | Age: OVER 89
End: 2025-05-21

## 2025-05-21 DIAGNOSIS — Z59.82 TRANSPORTATION UNAVAILABLE: Primary | ICD-10-CM

## 2025-05-21 SDOH — ECONOMIC STABILITY - TRANSPORTATION SECURITY: TRANSPORTATION INSECURITY: Z59.82

## 2025-06-03 NOTE — ASSESSMENT & PLAN NOTE
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 Not an infection. Cont treating for gout\"  Start colchicine based on Crcl of 30- 0.6mg daily  "
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 Not an infection. Cont treating for gout\"  Start colchicine based on Crcl of 30- 0.6mg every other day  "
- Left ankle bimalleolar fracture  - No recommendation for inpatient surgical intervention  - Recommend follow up with trauma team in 1 week to discuss operative vs non-operative management  - Contine NWB LLE in AO ankle splint  - Pain control per primary  - DVT ppx per primary  - Dispo: ortho signing off. Plan for outpatient follow up with trauma team. Please reach out with any further questions      
Appears to be chronic  Med reconciliation shows patient takes furosemide 20 mg every other day, continue with furosemide 20 mg daily while hospitalized  On assessment there is +2 edema in right lower extremity  Last echocardiogram in 2021 shows an EF of 60% with a normal systolic function, no wall motion abnormalities, wall thickness was mildly increased and there was a mild concentric hypertrophy  Patient does not have a history of heart failure and does not appear hypervolemic  Echo ordered  AILYN stockings on right leg, left has a cast  Elevate extremities  Daily weights  Consider outpatient cardiology follow-up  
Appears to be chronic  Med reconciliation shows patient takes furosemide 20 mg every other day, continue with furosemide 20 mg daily while hospitalized  On assessment there is +2 edema in right lower extremity  Last echocardiogram in 2021 shows an EF of 60% with a normal systolic function, no wall motion abnormalities, wall thickness was mildly increased and there was a mild concentric hypertrophy  Patient does not have a history of heart failure and does not appear hypervolemic  Echo ordered and will be followed  AILYN stockings on right leg, left has a cast  Elevate extremities  Daily weights  Consider outpatient cardiology follow-up if echo wnl  
Appears to be chronic  Med reconciliation shows patient takes furosemide 20 mg every other day, continue with furosemide 20 mg daily while hospitalized  On assessment there is +2 edema in right lower extremity  Last echocardiogram in 2021 shows an EF of 60% with a normal systolic function, no wall motion abnormalities, wall thickness was mildly increased and there was a mild concentric hypertrophy  Patient does not have a history of heart failure and does not appear hypervolemic  Echo ordered and will be followed  AILYN stockings on right leg, left has a cast  Elevate extremities  Daily weights  Consider outpatient cardiology follow-up if echo wnl  
Appears to be chronic  Resume lasix once creat is table  On assessment there is +2 edema in right lower extremity  Last echocardiogram in 2021 shows an EF of 60% with a normal systolic function, no wall motion abnormalities, wall thickness was mildly increased and there was a mild concentric hypertrophy  Patient does not have a history of heart failure and does not appear hypervolemic  Echo ordered and will be followed  AILYN stockings on right leg, left has a cast  Elevate extremities  Daily weights    
Daughter at bedside reports the last week her mother has been having some issues with mobility, mobilize at home with a roller walking  On 12/12/2024 the patient is left all night in a recliner and was not able to stand up on her own until caregiver came to be seen in the morning  Baseline is pretty much independent with roller walker, however in the last 24-48 hours she is requiring extensive assistance to mobilize  Every 2 hours turns  Fall precautions  PT for further evaluation and treatment  
Discussed with ortho: NWB and f/u OP in one week to discuss further surgical vs non surgical management  PT OT consulted  Pain adequately controlled  Family wants STR at DC  Currently in AO ankle splint, continue  Continue renally dosed DVT prophylaxis  
Discussed with ortho: NWB and f/u OP in one week to discuss further surgical vs non surgical management  PT OT consulted  Pain adequately controlled  Family wants STR at DC; pending placement to rehab  Currently in AO ankle splint, continue  Continue renally dosed DVT prophylaxis  
Doesn't meet KDIGO MARY criteria   Likely due to low BP causing hypoperfusion  Giving gentle hydration 500cc over 10 hours  Holding lasix and losartan    
Doesn't meet KDIGO MARY criteria   Likely due to low BP causing hypoperfusion  Giving gentle hydration 500cc over 10 hours  Holding lasix and losartan, if continues to improve, will resume tomorrow    
EKG shows GA interval of 306- 1st degree AV block  Also has IVCD  
EKG shows LA interval of 306- 1st degree AV block  Also has IVCD  
EKG shows MI interval of 306- 1st degree AV block  Also has IVCD  
EKG shows WI interval of 306- 1st degree AV block  Also has IVCD  
EKG shows WI interval of 306- 1st degree AV block  Also has IVCD  
Improved   Doesn't meet KDIGO MARY criteria   Likely due to low BP causing hypoperfusion  Giving gentle hydration 500cc over 10 hours  Holding lasix and losartan, if continues to improve, will resume tomorrow    
Improved   Doesn't meet KDIGO MARY criteria   Likely due to low BP causing hypoperfusion  Giving gentle hydration 500cc over 10 hours  Holding lasix and losartan, if continues to improve, will resume tomorrow    
Noted wound on right foot, third digit  Daughter reports he has been there for a while, per internal medicine she is not a surgical candidate  Check uric acid the a.m. of 12/14/2024  Per daughter request, podiatrist has been consulted  Wound care consult in place  
O2 sats greater than 92%, on room air, she is in no acute distress  Continue with albuterol as needed  Follows up with family medicine last visit 11/19/2024  Stable  
Presented with fall and resultant fracture, UTI is a possibility  Will send cultures prior to starting antibiotics  Pt denies urinary symptoms currently  
Presented with fall and resultant fracture, UTI is a possibility  Will send cultures prior to starting antibiotics  Pt denies urinary symptoms currently  Rocephin Day 3  
Presents in the emergency department accompanied by daughter due to ambulatory dysfunction.  Mrs. Matson lives alone and caregiver noted patient was not able to ambulate, also noted last 24 hours unsteady gait, moderate-severe pain in left lower ankle  In the ER, x-ray ankle 3+ views shows bimalleolar fracture above the level of the syndesmosis with a small amount of bony callus, transverse medial malleolus fracture minimally displaced, with minimal lateral tibial talar subluxation  X-ray knee 4 views right is negative for acute findings  Blood work in the ER is unremarkable, there is a mild leukocytosis likely to be reactive  Etiology of fracture could be multifactorial  Per ED provider orthopedics were consulted and no immediate intervention needed, recommend to follow-up outpatient.  In the ER stirrup/posterior short leg splint place  Require a dose of fentanyl due to pain, continue with Dilaudid, oxy and Tylenol for pain and nonpharmacological interventions for discomfort  Fall precautions  Weightbearing as tolerated  PT for further evaluation and treatment  
Presents in the emergency department accompanied by daughter due to ambulatory dysfunction.  Mrs. Matson lives alone and caregiver noted patient was not able to ambulate, also noted last 24 hours unsteady gait, moderate-severe pain in left lower ankle  In the ER, x-ray ankle 3+ views shows bimalleolar fracture above the level of the syndesmosis with a small amount of bony callus, transverse medial malleolus fracture minimally displaced, with minimal lateral tibial talar subluxation  X-ray knee 4 views right is negative for acute findings  Blood work in the ER is unremarkable, there is a mild leukocytosis likely to be reactive  Etiology of fracture could be multifactorial  Per ED provider orthopedics were consulted and no immediate intervention needed, recommend to follow-up outpatient.  In the ER stirrup/posterior short leg splint place  Require a dose of fentanyl due to pain, continue with Dilaudid, oxy and Tylenol for pain and nonpharmacological interventions for discomfort  Fall precautions  Weightbearing as tolerated  PT for further evaluation and treatment  
Wound care  
Statement Selected

## 2025-06-04 ENCOUNTER — PATIENT OUTREACH (OUTPATIENT)
Dept: CASE MANAGEMENT | Facility: OTHER | Age: OVER 89
End: 2025-06-04

## 2025-06-04 NOTE — PROGRESS NOTES
SHIV GREEN received referral for patient for transportation issues.  SHIV GREEN reviewed patient's chart and reached out regarding the above.  SHIV GREEN left a VM for her daughter

## 2025-06-12 ENCOUNTER — PATIENT OUTREACH (OUTPATIENT)
Dept: CASE MANAGEMENT | Facility: OTHER | Age: OVER 89
End: 2025-06-12

## 2025-06-12 NOTE — PROGRESS NOTES
SHIV GREEN reached out to patient's daughter whom confirmed her mom as to go to [facility] for a swallow test and was told that family would need to have someone that can assist patient to transfer from  to table that she needs the swallow test completed on.  Family aware that BLS is the only form of transport that provides extra staff needed.  She had called around for quotes, however they are very expensive.  SHIV GREEN also provided [id number] information to inquire about cost as they provide ambulette and BLS services as well.  No further SW assistance requested at this time. SHIV GREEN offered to reach out to location she will be getting test if needed, however currently declined.  Family will reach out of assistance is needed.  Referral closed.  
n/a

## 2025-07-10 ENCOUNTER — OFFICE VISIT (OUTPATIENT)
Dept: INTERNAL MEDICINE CLINIC | Facility: CLINIC | Age: OVER 89
End: 2025-07-10
Payer: MEDICARE

## 2025-07-10 VITALS
OXYGEN SATURATION: 97 % | SYSTOLIC BLOOD PRESSURE: 124 MMHG | HEIGHT: 65 IN | BODY MASS INDEX: 29.95 KG/M2 | DIASTOLIC BLOOD PRESSURE: 80 MMHG | HEART RATE: 76 BPM

## 2025-07-10 DIAGNOSIS — R22.32 LOCALIZED SWELLING ON LEFT HAND: Primary | ICD-10-CM

## 2025-07-10 DIAGNOSIS — M79.89 ARM SWELLING: ICD-10-CM

## 2025-07-10 DIAGNOSIS — R26.2 AMBULATORY DYSFUNCTION: ICD-10-CM

## 2025-07-10 DIAGNOSIS — Z99.3 DEPENDENCE ON WHEELCHAIR: ICD-10-CM

## 2025-07-10 PROCEDURE — 99214 OFFICE O/P EST MOD 30 MIN: CPT | Performed by: INTERNAL MEDICINE

## 2025-07-10 PROCEDURE — G2211 COMPLEX E/M VISIT ADD ON: HCPCS | Performed by: INTERNAL MEDICINE

## 2025-07-10 RX ORDER — CETIRIZINE HYDROCHLORIDE 10 MG/1
10 TABLET ORAL DAILY
COMMUNITY

## 2025-07-10 NOTE — PROGRESS NOTES
Name: Diya Garcia      : 1929      MRN: [id number]  Encounter Provider: All Edouard MD  Encounter Date: 7/10/2025   Encounter department: [facility] INTERNAL MEDICINE [place]  :  Assessment & Plan  Localized swelling on left hand  Swelling to right hand and wrist, she says it hurts all the way to the shoulder, since Saturday, denies falls, pt is not an accurate historian, she currently resides at a NH, neuro exam intact, check venous US for DVT    Cannot order it STAT as pt has no ride, needs to be coordinated through [place], also recommended going to the er, but there is the same issue.    Aide will check with NH about imaging they did.  Orders:    VAS upper limb venous duplex scan, unilateral/limited; Future    XR hand 3+ vw left; Future    XR wrist 2 vw left; Future    XR elbow 3+ vw left; Future    Arm swelling  See above.  Orders:    VAS upper limb venous duplex scan, unilateral/limited; Future    XR hand 3+ vw left; Future    XR wrist 2 vw left; Future    XR elbow 3+ vw left; Future    Ambulatory dysfunction  Not walking, using WC more.         Dependence on wheelchair  Noted.             Depression Screening and Follow-up Plan: Patient was screened for depression during today's encounter. They screened negative with a PHQ-2 score of 0.      Tobacco Cessation Counseling: Tobacco cessation counseling was provided. The patient is sincerely urged to quit consumption of tobacco. She is not ready to quit tobacco. Medication options discussed.       History of Present Illness   Routine f/u.      Review of Systems   Constitutional:  Negative for chills and fever.   HENT:  Negative for ear pain and sore throat.    Eyes:  Negative for pain and visual disturbance.   Respiratory:  Negative for cough and shortness of breath.    Cardiovascular:  Negative for chest pain and palpitations.   Gastrointestinal:  Negative for abdominal pain and vomiting.   Genitourinary:  Negative for dysuria and  "hematuria.   Musculoskeletal:  Positive for arthralgias and gait problem. Negative for back pain.   Skin:  Negative for color change and rash.   Neurological:  Negative for seizures and syncope.   All other systems reviewed and are negative.      Objective   /80 (BP Location: Right arm, Patient Position: Sitting, Cuff Size: Standard)   Pulse 76   Ht 5' 5\" (1.651 m)   SpO2 97%   BMI 29.95 kg/m²      Physical Exam  Vitals and nursing note reviewed.   Constitutional:       General: She is not in acute distress.     Appearance: She is well-developed.   HENT:      Head: Normocephalic and atraumatic.     Eyes:      Conjunctiva/sclera: Conjunctivae normal.       Cardiovascular:      Rate and Rhythm: Normal rate and regular rhythm.      Heart sounds: No murmur heard.  Pulmonary:      Effort: Pulmonary effort is normal. No respiratory distress.      Breath sounds: Normal breath sounds.   Abdominal:      Tenderness: There is no abdominal tenderness.     Musculoskeletal:         General: Swelling, deformity and signs of injury present.      Left upper arm: Swelling and edema present.      Left hand: Swelling present. Decreased range of motion.     Skin:     General: Skin is dry.     Neurological:      Mental Status: She is alert.     Psychiatric:         Mood and Affect: Mood normal.         "